# Patient Record
Sex: FEMALE | Race: WHITE | NOT HISPANIC OR LATINO | Employment: OTHER | URBAN - METROPOLITAN AREA
[De-identification: names, ages, dates, MRNs, and addresses within clinical notes are randomized per-mention and may not be internally consistent; named-entity substitution may affect disease eponyms.]

---

## 2017-04-11 ENCOUNTER — TRANSCRIBE ORDERS (OUTPATIENT)
Dept: ENDOCRINOLOGY | Facility: CLINIC | Age: 52
End: 2017-04-11

## 2017-04-11 DIAGNOSIS — E04.1 THYROID NODULE: Primary | ICD-10-CM

## 2017-09-26 ENCOUNTER — TRANSCRIBE ORDERS (OUTPATIENT)
Dept: ADMINISTRATIVE | Facility: HOSPITAL | Age: 52
End: 2017-09-26

## 2017-09-26 DIAGNOSIS — E04.1 RIGHT THYROID NODULE: Primary | ICD-10-CM

## 2017-09-28 ENCOUNTER — APPOINTMENT (OUTPATIENT)
Dept: ULTRASOUND IMAGING | Facility: HOSPITAL | Age: 52
End: 2017-09-28
Attending: SURGERY

## 2017-10-05 ENCOUNTER — HOSPITAL ENCOUNTER (OUTPATIENT)
Dept: ULTRASOUND IMAGING | Facility: HOSPITAL | Age: 52
Discharge: HOME | End: 2017-10-05
Attending: SURGERY | Admitting: SURGERY

## 2017-10-05 ENCOUNTER — HOSPITAL ENCOUNTER (OUTPATIENT)
Dept: ULTRASOUND IMAGING | Facility: HOSPITAL | Age: 52
Discharge: HOME | End: 2017-10-05
Attending: SURGERY

## 2017-10-05 DIAGNOSIS — E04.1 RIGHT THYROID NODULE: ICD-10-CM

## 2017-10-05 PROCEDURE — 76536 US EXAM OF HEAD AND NECK: CPT

## 2017-10-05 PROCEDURE — 76942 ECHO GUIDE FOR BIOPSY: CPT

## 2017-10-05 PROCEDURE — 88173 CYTOPATH EVAL FNA REPORT: CPT | Performed by: SURGERY

## 2017-10-05 RX ORDER — LIDOCAINE HYDROCHLORIDE 10 MG/ML
20 INJECTION, SOLUTION INFILTRATION; PERINEURAL ONCE
Status: COMPLETED | OUTPATIENT
Start: 2017-10-05 | End: 2017-10-05

## 2017-10-05 RX ADMIN — LIDOCAINE HYDROCHLORIDE 10 ML: 10 INJECTION, SOLUTION INFILTRATION; PERINEURAL at 10:38

## 2017-10-05 NOTE — PROCEDURES
Radiology Procedure    Pre-procedure: Multinodular goiter with dominant nodule right thyroid lobe    Procedure Performed: US-guided right thyroid FNA     IV Sedation and/or Anesthesia:  No    Complications: None    Preliminary Findings: Dominant heterogenous 1.5 cm right thyroid lobe nodule.     Specimen Removed: FNA x 3    Estimated Blood Loss:  0ml    Post-Procedure Diagnosis: Multinodular goiter dominant nodule right thyroid lobe    Post-Procedure Plan: Await cytology results. Resume orders from ordering physician.     Standard Discharge Instructions Given:yes     Horacio Barr DO  10/05/17  12:21 PM

## 2017-10-06 LAB
LAB AP CASE REPORT: NORMAL
Lab: NORMAL
PATH REPORT.FINAL DX SPEC: NORMAL

## 2018-03-15 ENCOUNTER — TRANSCRIBE ORDERS (OUTPATIENT)
Dept: ADMINISTRATIVE | Facility: HOSPITAL | Age: 53
End: 2018-03-15

## 2018-03-15 DIAGNOSIS — Z12.31 VISIT FOR SCREENING MAMMOGRAM: Primary | ICD-10-CM

## 2018-04-09 ENCOUNTER — HOSPITAL ENCOUNTER (OUTPATIENT)
Dept: GENERAL RADIOLOGY | Facility: HOSPITAL | Age: 53
Discharge: HOME | End: 2018-04-09
Admitting: INTERNAL MEDICINE

## 2018-04-09 ENCOUNTER — TRANSCRIBE ORDERS (OUTPATIENT)
Dept: ADMINISTRATIVE | Facility: HOSPITAL | Age: 53
End: 2018-04-09

## 2018-04-09 DIAGNOSIS — M79.672 LEFT FOOT PAIN: Primary | ICD-10-CM

## 2018-04-09 DIAGNOSIS — M79.672 LEFT FOOT PAIN: ICD-10-CM

## 2018-04-09 PROCEDURE — 73630 X-RAY EXAM OF FOOT: CPT

## 2018-04-13 ENCOUNTER — HOSPITAL ENCOUNTER (OUTPATIENT)
Dept: MAMMOGRAPHY | Facility: HOSPITAL | Age: 53
Discharge: HOME | End: 2018-04-13
Admitting: INTERNAL MEDICINE

## 2018-04-13 DIAGNOSIS — Z12.31 VISIT FOR SCREENING MAMMOGRAM: ICD-10-CM

## 2018-04-13 PROCEDURE — 77063 BREAST TOMOSYNTHESIS BI: CPT | Performed by: RADIOLOGY

## 2018-04-13 PROCEDURE — 77067 SCR MAMMO BI INCL CAD: CPT | Performed by: RADIOLOGY

## 2018-04-13 PROCEDURE — 77063 BREAST TOMOSYNTHESIS BI: CPT

## 2018-04-13 PROCEDURE — 77067 SCR MAMMO BI INCL CAD: CPT

## 2018-04-26 ENCOUNTER — LAB (OUTPATIENT)
Dept: LAB | Facility: HOSPITAL | Age: 53
End: 2018-04-26

## 2018-04-26 ENCOUNTER — TELEPHONE (OUTPATIENT)
Dept: NEUROLOGY | Facility: CLINIC | Age: 53
End: 2018-04-26

## 2018-04-26 ENCOUNTER — OFFICE VISIT (OUTPATIENT)
Dept: NEUROLOGY | Facility: CLINIC | Age: 53
End: 2018-04-26

## 2018-04-26 VITALS
OXYGEN SATURATION: 99 % | SYSTOLIC BLOOD PRESSURE: 122 MMHG | DIASTOLIC BLOOD PRESSURE: 82 MMHG | WEIGHT: 145 LBS | RESPIRATION RATE: 18 BRPM | HEIGHT: 61 IN | BODY MASS INDEX: 27.38 KG/M2 | HEART RATE: 67 BPM

## 2018-04-26 DIAGNOSIS — G35 MULTIPLE SCLEROSIS (HCC): ICD-10-CM

## 2018-04-26 DIAGNOSIS — G35 MULTIPLE SCLEROSIS (HCC): Primary | ICD-10-CM

## 2018-04-26 LAB
ALBUMIN SERPL-MCNC: 4.9 G/DL (ref 3.2–4.8)
ALBUMIN/GLOB SERPL: 2.5 G/DL (ref 1.5–2.5)
ALP SERPL-CCNC: 77 U/L (ref 25–100)
ALT SERPL W P-5'-P-CCNC: 27 U/L (ref 7–40)
ANION GAP SERPL CALCULATED.3IONS-SCNC: 5 MMOL/L (ref 3–11)
AST SERPL-CCNC: 29 U/L (ref 0–33)
BASOPHILS # BLD AUTO: 0.06 10*3/MM3 (ref 0–0.2)
BASOPHILS NFR BLD AUTO: 1.6 % (ref 0–1)
BILIRUB SERPL-MCNC: 0.4 MG/DL (ref 0.3–1.2)
BUN BLD-MCNC: 25 MG/DL (ref 9–23)
BUN/CREAT SERPL: 31.3 (ref 7–25)
CALCIUM SPEC-SCNC: 9.9 MG/DL (ref 8.7–10.4)
CHLORIDE SERPL-SCNC: 102 MMOL/L (ref 99–109)
CO2 SERPL-SCNC: 32 MMOL/L (ref 20–31)
CREAT BLD-MCNC: 0.8 MG/DL (ref 0.6–1.3)
DEPRECATED RDW RBC AUTO: 45.7 FL (ref 37–54)
EOSINOPHIL # BLD AUTO: 0.14 10*3/MM3 (ref 0–0.3)
EOSINOPHIL NFR BLD AUTO: 3.8 % (ref 0–3)
ERYTHROCYTE [DISTWIDTH] IN BLOOD BY AUTOMATED COUNT: 13.1 % (ref 11.3–14.5)
GFR SERPL CREATININE-BSD FRML MDRD: 75 ML/MIN/1.73
GLOBULIN UR ELPH-MCNC: 2 GM/DL
GLUCOSE BLD-MCNC: 92 MG/DL (ref 70–100)
HCT VFR BLD AUTO: 40.3 % (ref 34.5–44)
HGB BLD-MCNC: 13 G/DL (ref 11.5–15.5)
IMM GRANULOCYTES # BLD: 0 10*3/MM3 (ref 0–0.03)
IMM GRANULOCYTES NFR BLD: 0 % (ref 0–0.6)
LYMPHOCYTES # BLD AUTO: 0.78 10*3/MM3 (ref 0.6–4.8)
LYMPHOCYTES NFR BLD AUTO: 21.1 % (ref 24–44)
MCH RBC QN AUTO: 31.1 PG (ref 27–31)
MCHC RBC AUTO-ENTMCNC: 32.3 G/DL (ref 32–36)
MCV RBC AUTO: 96.4 FL (ref 80–99)
MONOCYTES # BLD AUTO: 0.59 10*3/MM3 (ref 0–1)
MONOCYTES NFR BLD AUTO: 15.9 % (ref 0–12)
NEUTROPHILS # BLD AUTO: 2.13 10*3/MM3 (ref 1.5–8.3)
NEUTROPHILS NFR BLD AUTO: 57.6 % (ref 41–71)
PLATELET # BLD AUTO: 201 10*3/MM3 (ref 150–450)
PMV BLD AUTO: 13.5 FL (ref 6–12)
POTASSIUM BLD-SCNC: 5.4 MMOL/L (ref 3.5–5.5)
PROT SERPL-MCNC: 6.9 G/DL (ref 5.7–8.2)
RBC # BLD AUTO: 4.18 10*6/MM3 (ref 3.89–5.14)
SODIUM BLD-SCNC: 139 MMOL/L (ref 132–146)
WBC NRBC COR # BLD: 3.7 10*3/MM3 (ref 3.5–10.8)

## 2018-04-26 PROCEDURE — 80053 COMPREHEN METABOLIC PANEL: CPT

## 2018-04-26 PROCEDURE — 99245 OFF/OP CONSLTJ NEW/EST HI 55: CPT | Performed by: PSYCHIATRY & NEUROLOGY

## 2018-04-26 PROCEDURE — 85025 COMPLETE CBC W/AUTO DIFF WBC: CPT

## 2018-04-26 PROCEDURE — 36415 COLL VENOUS BLD VENIPUNCTURE: CPT

## 2018-04-26 RX ORDER — MODAFINIL 100 MG/1
100 TABLET ORAL DAILY PRN
COMMUNITY
End: 2019-03-22 | Stop reason: SDUPTHER

## 2018-04-26 RX ORDER — HYDROCODONE BITARTRATE AND ACETAMINOPHEN 5; 325 MG/1; MG/1
TABLET ORAL
Refills: 0 | COMMUNITY
Start: 2018-04-11 | End: 2019-10-31

## 2018-04-26 RX ORDER — GABAPENTIN 300 MG
CAPSULE ORAL
Refills: 0 | COMMUNITY
Start: 2018-03-21 | End: 2018-05-21 | Stop reason: ALTCHOICE

## 2018-04-26 RX ORDER — ZOLPIDEM TARTRATE 10 MG/1
10 TABLET ORAL
Refills: 0 | COMMUNITY
Start: 2018-03-12 | End: 2018-09-21

## 2018-04-26 RX ORDER — DOXYCYCLINE HYCLATE 100 MG/1
CAPSULE ORAL
COMMUNITY
Start: 2018-04-25 | End: 2018-05-21

## 2018-04-26 RX ORDER — PREGABALIN 200 MG/1
200 CAPSULE ORAL NIGHTLY
Qty: 60 CAPSULE | Refills: 5 | Status: SHIPPED | OUTPATIENT
Start: 2018-04-26 | End: 2018-05-21 | Stop reason: SDUPTHER

## 2018-04-26 RX ORDER — RENAGEL 800 MG/1
TABLET ORAL
COMMUNITY
Start: 2018-03-20 | End: 2018-06-20 | Stop reason: ALTCHOICE

## 2018-04-26 NOTE — PROGRESS NOTES
Subjective   Patient ID: Mckenna Castellano is a 53 y.o. female     Chief Complaint   Patient presents with   • Multiple Sclerosis        History of Present Illness    53 y.o. female presents for evaluation of RRMS.  Previous pt of Dr Fraser.    Developed bilateral LE numbness.  Then progressed to left leg up to Face and arm.  Left sided increased sensitivity to touch.  Sx lasted for 3 months.  GBP used to improve pain.  No other relapses since first event.      Started on Aubagio in 10/2017.  Noted mild hair loss after starting.  Denies new or worsening sx.  Heat intolerance is moderate.  Fatigue is mild to moderate.  Balance is off and left leg is weaker.  ST memory decreasing.  Bladder frequency and urgency.      Feet have N/T at bedtime.   mg qhs wears off and does not last all night.        Reviewed previous medical records:     Dx with MS in 2005 by Dr Benitez Fraser.  Onset had numbness in both legs, then LE F/A/L N and allodynia.  Treated with interferon for 2 months, N/V.  Switched to Copaxone for 4 -5 years.  Then to Gilenya.  Tolerating Gilenya without noted dz activity.  Moved to Aubagio due to low counts.  During switch to Aubagio noted new left medullary lesion with left leg weakness.  Stopped Aubagio due to insurance issues.      MRI Brain - 10/2/17 new area of enhancement in left medulla, moderate T2 lesions  Past Medical History:   Diagnosis Date   • Anxiety    • Depression    • Hyperlipidemia    • Migraine      Family History   Problem Relation Age of Onset   • Adopted: Yes   • Breast cancer Neg Hx    • Ovarian cancer Neg Hx      Social History     Social History   • Marital status:      Social History Main Topics   • Smoking status: Never Smoker   • Alcohol use No   • Drug use: No   • Sexual activity: Defer     Other Topics Concern   • Not on file       Review of Systems   Constitutional: Positive for fatigue. Negative for activity change and unexpected weight change.   HENT: Negative for  "tinnitus and trouble swallowing.    Eyes: Positive for visual disturbance. Negative for photophobia.   Respiratory: Negative for apnea, cough and choking.    Cardiovascular: Negative for leg swelling.   Gastrointestinal: Negative for nausea and vomiting.   Endocrine: Positive for heat intolerance. Negative for cold intolerance.   Genitourinary: Positive for frequency and urgency. Negative for difficulty urinating and menstrual problem.   Musculoskeletal: Negative for back pain, gait problem, myalgias and neck pain.   Skin: Negative for color change and rash.   Allergic/Immunologic: Negative for immunocompromised state.   Neurological: Positive for weakness and numbness. Negative for dizziness, tremors, seizures, syncope, facial asymmetry, speech difficulty, light-headedness and headaches.   Hematological: Negative for adenopathy. Does not bruise/bleed easily.   Psychiatric/Behavioral: Positive for decreased concentration. Negative for behavioral problems, confusion, hallucinations and sleep disturbance.       Objective     Vitals:    04/26/18 0936   BP: 122/82   BP Location: Left arm   Patient Position: Sitting   Cuff Size: Adult   Pulse: 67   Resp: 18   SpO2: 99%   Weight: 65.8 kg (145 lb)   Height: 154.9 cm (61\")       Neurologic Exam     Mental Status   Oriented to person, place, and time.   Registration: recalls 3 of 3 objects. Recall at 5 minutes: recalls 3 of 3 objects. Follows 3 step commands.   Attention: normal. Concentration: normal.   Speech: speech is normal   Level of consciousness: alert  Knowledge: good and consistent with education.   Able to name object. Able to read. Able to repeat. Able to write. Normal comprehension.     Cranial Nerves     CN II   Visual fields full to confrontation.   Visual acuity: normal  Right visual field deficit: none  Left visual field deficit: none     CN III, IV, VI   Pupils are equal, round, and reactive to light.  Extraocular motions are normal.   Right pupil: Shape: " regular. Reactivity: brisk. Consensual response: intact.   Left pupil: Shape: regular. Reactivity: brisk. Consensual response: intact.   Nystagmus: none   Diplopia: none  Ophthalmoparesis: none  Upgaze: normal  Downgaze: normal  Conjugate gaze: present  Vestibulo-ocular reflex: present    CN V   Facial sensation intact.   Right corneal reflex: normal  Left corneal reflex: normal    CN VII   Right facial weakness: none  Left facial weakness: none    CN VIII   Hearing: intact    CN IX, X   Palate: symmetric  Right gag reflex: normal  Left gag reflex: normal    CN XI   Right sternocleidomastoid strength: normal  Left sternocleidomastoid strength: normal    CN XII   Tongue: not atrophic  Fasciculations: absent  Tongue deviation: none    Motor Exam   Muscle bulk: normal  Overall muscle tone: normal  Right arm tone: normal  Left arm tone: normal  Right leg tone: normal  Left leg tone: normal    Strength   Strength 5/5 throughout.     Sensory Exam   Light touch normal.   Vibration normal.   Proprioception normal.   Pinprick normal.     Gait, Coordination, and Reflexes     Gait  Gait: normal    Coordination   Romberg: negative  Finger to nose coordination: normal  Heel to shin coordination: normal  Tandem walking coordination: normal    Tremor   Resting tremor: absent  Intention tremor: absent  Action tremor: absent    Reflexes   Reflexes 2+ except as noted.       Physical Exam   Constitutional: She is oriented to person, place, and time. Vital signs are normal. She appears well-developed and well-nourished.   HENT:   Head: Normocephalic and atraumatic.   Eyes: EOM and lids are normal. Pupils are equal, round, and reactive to light.   Fundoscopic exam:       The right eye shows no exudate, no hemorrhage and no papilledema. The right eye shows venous pulsations.        The left eye shows no exudate, no hemorrhage and no papilledema. The left eye shows venous pulsations.   Neck: Normal range of motion and phonation normal.  Normal carotid pulses present. Carotid bruit is not present. No thyroid mass and no thyromegaly present.   Cardiovascular: Normal rate, regular rhythm and normal heart sounds.    Pulmonary/Chest: Effort normal.   Neurological: She is oriented to person, place, and time. She has normal strength. She has a normal Finger-Nose-Finger Test, a normal Heel to Shin Test, a normal Romberg Test and a normal Tandem Gait Test. Gait normal.   Skin: Skin is warm and dry.   Psychiatric: She has a normal mood and affect. Her speech is normal.   Nursing note and vitals reviewed.      Hospital Outpatient Visit on 10/05/2017   Component Date Value Ref Range Status   • Case Report 10/06/2017    Final                    Value:Non-gynecologic Cytology                          Case: [id number]                                  Authorizing Provider:  Mark GARCIA MD          Collected:           10/05/2017 10:40 AM          Ordering Location:     [facility]   Received:            10/05/2017 11:12 AM                                 [facility]                                                                   Pathologist:           Eduardo Rm MD                                                      Specimen:    Thyroid                                                                                   • Final Diagnosis 10/06/2017    Final                    Value:This result contains rich text formatting which cannot be displayed here.         Assessment/Plan     Problem List Items Addressed This Visit        Nervous and Auditory    Multiple sclerosis - Primary    Current Assessment & Plan     Sx stable on Aubagio for last 6 months    Check MRI to assess Aubagio  CBC,CMP  Will change to Lyrica as GBP is wearing off          Relevant Orders    CBC & Differential    Comprehensive Metabolic Panel    MRI Brain With & Without Contrast      Other Visit Diagnoses    None.            No Follow-up on file.

## 2018-04-26 NOTE — ASSESSMENT & PLAN NOTE
Sx stable on Aubagio for last 6 months    Check MRI to assess Aubagio  CBC,CMP  Will change to Lyrica as GBP is wearing off

## 2018-04-26 NOTE — TELEPHONE ENCOUNTER
Calling patient to let her know that mri has been scheduled at [facility] For 5/1/18 and 8 am. Leave message to return my call

## 2018-04-27 ENCOUNTER — HOSPITAL ENCOUNTER (OUTPATIENT)
Dept: GENERAL RADIOLOGY | Facility: HOSPITAL | Age: 53
Discharge: HOME | End: 2018-04-27
Admitting: INTERNAL MEDICINE

## 2018-04-27 ENCOUNTER — TELEPHONE (OUTPATIENT)
Dept: NEUROLOGY | Facility: CLINIC | Age: 53
End: 2018-04-27

## 2018-04-27 ENCOUNTER — TRANSCRIBE ORDERS (OUTPATIENT)
Dept: ADMINISTRATIVE | Facility: HOSPITAL | Age: 53
End: 2018-04-27

## 2018-04-27 DIAGNOSIS — M79.644 FINGER PAIN, RIGHT: ICD-10-CM

## 2018-04-27 DIAGNOSIS — M79.644 FINGER PAIN, RIGHT: Primary | ICD-10-CM

## 2018-04-27 PROCEDURE — 73140 X-RAY EXAM OF FINGER(S): CPT

## 2018-05-18 ENCOUNTER — TELEPHONE (OUTPATIENT)
Dept: NEUROLOGY | Facility: CLINIC | Age: 53
End: 2018-05-18

## 2018-05-18 NOTE — TELEPHONE ENCOUNTER
Received a call from patient specialty pharmacy for a refill on Aubagio. Gave a 1 month supply till patient can come back in for evaluation of MRI. Gave verbal to pharmacist.

## 2018-05-21 ENCOUNTER — OFFICE VISIT (OUTPATIENT)
Dept: NEUROLOGY | Facility: CLINIC | Age: 53
End: 2018-05-21

## 2018-05-21 VITALS
DIASTOLIC BLOOD PRESSURE: 84 MMHG | SYSTOLIC BLOOD PRESSURE: 122 MMHG | HEIGHT: 61 IN | HEART RATE: 58 BPM | BODY MASS INDEX: 27.38 KG/M2 | OXYGEN SATURATION: 99 % | RESPIRATION RATE: 18 BRPM | WEIGHT: 145 LBS

## 2018-05-21 DIAGNOSIS — M79.2 NEUROPATHIC PAIN: ICD-10-CM

## 2018-05-21 DIAGNOSIS — G35 MULTIPLE SCLEROSIS (HCC): Primary | ICD-10-CM

## 2018-05-21 PROCEDURE — 99214 OFFICE O/P EST MOD 30 MIN: CPT | Performed by: PSYCHIATRY & NEUROLOGY

## 2018-05-21 RX ORDER — PREGABALIN 200 MG/1
200 CAPSULE ORAL 2 TIMES DAILY
Qty: 60 CAPSULE | Refills: 5 | Status: SHIPPED | OUTPATIENT
Start: 2018-05-21 | End: 2018-12-07 | Stop reason: SDUPTHER

## 2018-05-21 NOTE — PROGRESS NOTES
Subjective   Patient ID: Mckenna Castellano is a 53 y.o. female     Chief Complaint   Patient presents with   • Multiple Sclerosis        History of Present Illness    53 y.o. female presents for evaluation of RRMS.  Last visit on 4/26/18 continued Aubagio, ordered MRI Brain and labs, started Lyrica.      MRI Brain, my review of films, 5/1/18 few scattered T2 lesions, PVWM and medulla  without new enlarging or enhancing lesions    Labs - 4/26/18 CBC,CMP - [facility]    Increased bladder or frequency in last few months.     Started on Lyrica 200 mg qhs but needs more during the day.  Feet/hands develop B/N/T with fatigue and stress.     Concerned about hair loss on Aubagio.  Was doing well on Gilenya and wishes to restart.  Stopped due to low abs lymph.     Problem history:    Developed bilateral LE numbness.  Then progressed to left leg up to Face and arm.  Left sided increased sensitivity to touch.  Sx lasted for 3 months.  GBP used to improve pain.  No other relapses since first event.      Started on Aubagio in 10/2017.  Noted mild hair loss after starting.  Denies new or worsening sx.  Heat intolerance is moderate.  Fatigue is mild to moderate.  Balance is off and left leg is weaker.  ST memory decreasing.  Bladder frequency and urgency.      Feet have N/T at bedtime.   mg qhs wears off and does not last all night.        Reviewed previous medical records:     Dx with MS in 2005 by Dr Benitez Fraser.  Onset had numbness in both legs, then LE F/A/L N and allodynia.  Treated with interferon for 2 months, N/V.  Switched to Copaxone for 4 -5 years.  Then to Gilenya.  Tolerating Gilenya without noted dz activity.  Moved to [facility] due to low counts.  During switch to Aubagio noted new left medullary lesion with left leg weakness.  Stopped Aubagio due to insurance issues.      MRI Brain - 10/2/17 new area of enhancement in right medulla, moderate T2 lesions    Past Medical History:   Diagnosis Date   • Anxiety    • Depression  "   • Hyperlipidemia    • Migraine    • MS (multiple sclerosis)      Family History   Problem Relation Age of Onset   • Adopted: Yes   • Breast cancer Neg Hx    • Ovarian cancer Neg Hx      Social History     Social History   • Marital status:      Social History Main Topics   • Smoking status: Never Smoker   • Alcohol use No   • Drug use: No   • Sexual activity: Defer     Other Topics Concern   • Not on file       Review of Systems   Constitutional: Negative for activity change and unexpected weight change.   HENT: Negative for tinnitus and trouble swallowing.    Eyes: Positive for visual disturbance. Negative for photophobia.   Respiratory: Negative for apnea and choking.    Cardiovascular: Negative for leg swelling.   Endocrine: Positive for heat intolerance. Negative for cold intolerance.   Genitourinary: Positive for frequency and urgency. Negative for difficulty urinating and menstrual problem.   Musculoskeletal: Negative for back pain and gait problem.   Skin: Negative for color change.   Allergic/Immunologic: Negative for immunocompromised state.   Neurological: Negative for dizziness, tremors, seizures, syncope, facial asymmetry, speech difficulty and light-headedness.   Hematological: Negative for adenopathy. Does not bruise/bleed easily.   Psychiatric/Behavioral: Positive for decreased concentration. Negative for behavioral problems, confusion, hallucinations and sleep disturbance.       Objective     Vitals:    05/21/18 1307   BP: 122/84   BP Location: Right arm   Patient Position: Sitting   Cuff Size: Adult   Pulse: 58   Resp: 18   SpO2: 99%   Weight: 65.8 kg (145 lb)   Height: 154.9 cm (61\")       Neurologic Exam     Mental Status   Registration: recalls 3 of 3 objects. Recall at 5 minutes: recalls 3 of 3 objects. Follows 3 step commands.   Attention: normal. Concentration: normal.   Level of consciousness: alert  Knowledge: good and consistent with education.   Able to name object. Able to " read. Able to repeat. Able to write. Normal comprehension.     Cranial Nerves     CN II   Visual fields full to confrontation.   Visual acuity: normal  Right visual field deficit: none  Left visual field deficit: none     CN III, IV, VI   Right pupil: Shape: regular. Reactivity: brisk. Consensual response: intact.   Left pupil: Shape: regular. Reactivity: brisk. Consensual response: intact.   Nystagmus: none   Diplopia: none  Ophthalmoparesis: none  Upgaze: normal  Downgaze: normal  Conjugate gaze: present  Vestibulo-ocular reflex: present    CN V   Facial sensation intact.   Right corneal reflex: normal  Left corneal reflex: normal    CN VII   Right facial weakness: none  Left facial weakness: none    CN VIII   Hearing: intact    CN IX, X   Palate: symmetric  Right gag reflex: normal  Left gag reflex: normal    CN XI   Right sternocleidomastoid strength: normal  Left sternocleidomastoid strength: normal    CN XII   Tongue: not atrophic  Fasciculations: absent  Tongue deviation: none    Motor Exam   Muscle bulk: normal  Overall muscle tone: normal  Right arm tone: normal  Left arm tone: normal  Right leg tone: normal  Left leg tone: normal    Sensory Exam   Light touch normal.   Vibration normal.   Proprioception normal.   Pinprick normal.     Gait, Coordination, and Reflexes     Tremor   Resting tremor: absent  Intention tremor: absent  Action tremor: absent    Reflexes   Reflexes 2+ except as noted.       Physical Exam   Constitutional: She appears well-developed and well-nourished.   Nursing note and vitals reviewed.      Lab on 04/26/2018   Component Date Value Ref Range Status   • Glucose 04/26/2018 92  70 - 100 mg/dL Final   • BUN 04/26/2018 25* 9 - 23 mg/dL Final   • Creatinine 04/26/2018 0.80  0.60 - 1.30 mg/dL Final   • Sodium 04/26/2018 139  132 - 146 mmol/L Final   • Potassium 04/26/2018 5.4  3.5 - 5.5 mmol/L Final   • Chloride 04/26/2018 102  99 - 109 mmol/L Final   • CO2 04/26/2018 32.0* 20.0 - 31.0  mmol/L Final   • Calcium 04/26/2018 9.9  8.7 - 10.4 mg/dL Final   • Total Protein 04/26/2018 6.9  5.7 - 8.2 g/dL Final   • Albumin 04/26/2018 4.90* 3.20 - 4.80 g/dL Final   • ALT (SGPT) 04/26/2018 27  7 - 40 U/L Final   • AST (SGOT) 04/26/2018 29  0 - 33 U/L Final   • Alkaline Phosphatase 04/26/2018 77  25 - 100 U/L Final   • Total Bilirubin 04/26/2018 0.4  0.3 - 1.2 mg/dL Final   • eGFR Non African Amer 04/26/2018 75  >60 mL/min/1.73 Final   • Globulin 04/26/2018 2.0  gm/dL Final   • A/G Ratio 04/26/2018 2.5  1.5 - 2.5 g/dL Final   • BUN/Creatinine Ratio 04/26/2018 31.3* 7.0 - 25.0 Final   • Anion Gap 04/26/2018 5.0  3.0 - 11.0 mmol/L Final   • WBC 04/26/2018 3.70  3.50 - 10.80 10*3/mm3 Final   • RBC 04/26/2018 4.18  3.89 - 5.14 10*6/mm3 Final   • Hemoglobin 04/26/2018 13.0  11.5 - 15.5 g/dL Final   • Hematocrit 04/26/2018 40.3  34.5 - 44.0 % Final   • MCV 04/26/2018 96.4  80.0 - 99.0 fL Final   • MCH 04/26/2018 31.1* 27.0 - 31.0 pg Final   • MCHC 04/26/2018 32.3  32.0 - 36.0 g/dL Final   • RDW 04/26/2018 13.1  11.3 - 14.5 % Final   • RDW-SD 04/26/2018 45.7  37.0 - 54.0 fl Final   • MPV 04/26/2018 13.5* 6.0 - 12.0 fL Final   • Platelets 04/26/2018 201  150 - 450 10*3/mm3 Final   • Neutrophil % 04/26/2018 57.6  41.0 - 71.0 % Final   • Lymphocyte % 04/26/2018 21.1* 24.0 - 44.0 % Final   • Monocyte % 04/26/2018 15.9* 0.0 - 12.0 % Final   • Eosinophil % 04/26/2018 3.8* 0.0 - 3.0 % Final   • Basophil % 04/26/2018 1.6* 0.0 - 1.0 % Final   • Immature Grans % 04/26/2018 0.0  0.0 - 0.6 % Final   • Neutrophils, Absolute 04/26/2018 2.13  1.50 - 8.30 10*3/mm3 Final   • Lymphocytes, Absolute 04/26/2018 0.78  0.60 - 4.80 10*3/mm3 Final   • Monocytes, Absolute 04/26/2018 0.59  0.00 - 1.00 10*3/mm3 Final   • Eosinophils, Absolute 04/26/2018 0.14  0.00 - 0.30 10*3/mm3 Final   • Basophils, Absolute 04/26/2018 0.06  0.00 - 0.20 10*3/mm3 Final   • Immature Grans, Absolute 04/26/2018 0.00  0.00 - 0.03 10*3/mm3 Final          Assessment/Plan     Problem List Items Addressed This Visit        Nervous and Auditory    Multiple sclerosis - Primary    Current Assessment & Plan     MRI brain is stable     Wishes to switch back to Gilenya due to hair loss             Neuropathic pain    Current Assessment & Plan     Increase Lyrica 200 mg BID due to sx coming back during the day                   No Follow-up on file.

## 2018-06-05 ENCOUNTER — TELEPHONE (OUTPATIENT)
Dept: NEUROLOGY | Facility: CLINIC | Age: 53
End: 2018-06-05

## 2018-06-05 DIAGNOSIS — G35 MS (MULTIPLE SCLEROSIS) (HCC): Primary | ICD-10-CM

## 2018-06-06 NOTE — TELEPHONE ENCOUNTER
Called patient, spoke with the patient, patient is aware of the labs needing to be completed and also that she needs to have a base line eye exam completed. Patient verbalized understanding.

## 2018-06-11 ENCOUNTER — TELEPHONE (OUTPATIENT)
Dept: NEUROLOGY | Facility: CLINIC | Age: 53
End: 2018-06-11

## 2018-06-11 ENCOUNTER — LAB (OUTPATIENT)
Dept: LAB | Facility: HOSPITAL | Age: 53
End: 2018-06-11

## 2018-06-11 DIAGNOSIS — G35 MS (MULTIPLE SCLEROSIS) (HCC): ICD-10-CM

## 2018-06-11 LAB
ALBUMIN SERPL-MCNC: 4.69 G/DL (ref 3.2–4.8)
ALBUMIN/GLOB SERPL: 2 G/DL (ref 1.5–2.5)
ALP SERPL-CCNC: 94 U/L (ref 25–100)
ALT SERPL W P-5'-P-CCNC: 21 U/L (ref 7–40)
ANION GAP SERPL CALCULATED.3IONS-SCNC: 11 MMOL/L (ref 3–11)
AST SERPL-CCNC: 26 U/L (ref 0–33)
BASOPHILS # BLD AUTO: 0.05 10*3/MM3 (ref 0–0.2)
BASOPHILS NFR BLD AUTO: 1.5 % (ref 0–1)
BILIRUB SERPL-MCNC: 0.4 MG/DL (ref 0.3–1.2)
BUN BLD-MCNC: 20 MG/DL (ref 9–23)
BUN/CREAT SERPL: 21.5 (ref 7–25)
CALCIUM SPEC-SCNC: 9.6 MG/DL (ref 8.7–10.4)
CHLORIDE SERPL-SCNC: 103 MMOL/L (ref 99–109)
CO2 SERPL-SCNC: 29 MMOL/L (ref 20–31)
CREAT BLD-MCNC: 0.93 MG/DL (ref 0.6–1.3)
DEPRECATED RDW RBC AUTO: 46.7 FL (ref 37–54)
EOSINOPHIL # BLD AUTO: 0.12 10*3/MM3 (ref 0–0.3)
EOSINOPHIL NFR BLD AUTO: 3.6 % (ref 0–3)
ERYTHROCYTE [DISTWIDTH] IN BLOOD BY AUTOMATED COUNT: 13.2 % (ref 11.3–14.5)
GFR SERPL CREATININE-BSD FRML MDRD: 63 ML/MIN/1.73
GLOBULIN UR ELPH-MCNC: 2.3 GM/DL
GLUCOSE BLD-MCNC: 111 MG/DL (ref 70–100)
HCT VFR BLD AUTO: 38.2 % (ref 34.5–44)
HGB BLD-MCNC: 12.4 G/DL (ref 11.5–15.5)
IMM GRANULOCYTES # BLD: 0 10*3/MM3 (ref 0–0.03)
IMM GRANULOCYTES NFR BLD: 0 % (ref 0–0.6)
LYMPHOCYTES # BLD AUTO: 0.9 10*3/MM3 (ref 0.6–4.8)
LYMPHOCYTES NFR BLD AUTO: 27.3 % (ref 24–44)
MCH RBC QN AUTO: 31.2 PG (ref 27–31)
MCHC RBC AUTO-ENTMCNC: 32.5 G/DL (ref 32–36)
MCV RBC AUTO: 96 FL (ref 80–99)
MONOCYTES # BLD AUTO: 0.43 10*3/MM3 (ref 0–1)
MONOCYTES NFR BLD AUTO: 13 % (ref 0–12)
NEUTROPHILS # BLD AUTO: 1.8 10*3/MM3 (ref 1.5–8.3)
NEUTROPHILS NFR BLD AUTO: 54.6 % (ref 41–71)
PLATELET # BLD AUTO: 195 10*3/MM3 (ref 150–450)
PMV BLD AUTO: 13.7 FL (ref 6–12)
POTASSIUM BLD-SCNC: 4.6 MMOL/L (ref 3.5–5.5)
PROT SERPL-MCNC: 7 G/DL (ref 5.7–8.2)
RBC # BLD AUTO: 3.98 10*6/MM3 (ref 3.89–5.14)
SODIUM BLD-SCNC: 143 MMOL/L (ref 132–146)
WBC NRBC COR # BLD: 3.3 10*3/MM3 (ref 3.5–10.8)

## 2018-06-11 PROCEDURE — 85025 COMPLETE CBC W/AUTO DIFF WBC: CPT

## 2018-06-11 PROCEDURE — 36415 COLL VENOUS BLD VENIPUNCTURE: CPT

## 2018-06-11 PROCEDURE — 86787 VARICELLA-ZOSTER ANTIBODY: CPT

## 2018-06-11 PROCEDURE — 80053 COMPREHEN METABOLIC PANEL: CPT

## 2018-06-11 NOTE — TELEPHONE ENCOUNTER
----- Message from Windy Quiñones sent at 6/11/2018  1:04 PM EDT -----  Regarding: refill  Contact: [phone]  [facility] [place]    ambien 10 mg

## 2018-06-12 LAB — VZV IGG SER IA-ACNC: 887 INDEX

## 2018-06-20 ENCOUNTER — TELEPHONE (OUTPATIENT)
Dept: NEUROLOGY | Facility: CLINIC | Age: 53
End: 2018-06-20

## 2018-06-20 RX ORDER — FINGOLIMOD HYDROCHLORIDE 0.5 MG/1
0.5 CAPSULE ORAL DAILY
Qty: 30 CAPSULE | Refills: 3 | Status: SHIPPED | OUTPATIENT
Start: 2018-06-20 | End: 2018-10-15 | Stop reason: SDUPTHER

## 2018-06-20 NOTE — TELEPHONE ENCOUNTER
Received fax requesting a script be sent to the specialty pharmacy. Please double check this script and make sure its correct. Has to have ICD10 on the script- so check that also for me. Thanks!!

## 2018-06-20 NOTE — TELEPHONE ENCOUNTER
I called the number, spoke with a person but they were asking questions I did not feel that I could answer, told them I would speak with you regarding the patient and call them back. Is there anything special I need to tell them? Thanks for the help!!

## 2018-06-20 NOTE — TELEPHONE ENCOUNTER
The authorization was scanned in the chart on 6-14-18 and no one notified.  I checked it today and found that it was there.  I have spoken with the patient and she will be scheduled for FDO on June 27.

## 2018-06-20 NOTE — TELEPHONE ENCOUNTER
----- Message from Windy Quiñones sent at 6/20/2018  2:53 PM EDT -----  Regarding: FIRST DOSE OBSERVATION  Contact: [phone]  [facility] phoned they would like to know if patient completed their first dose observation.    [phone] phone number

## 2018-06-21 ENCOUNTER — TELEPHONE (OUTPATIENT)
Dept: NEUROLOGY | Facility: CLINIC | Age: 53
End: 2018-06-21

## 2018-06-21 ENCOUNTER — PREP FOR SURGERY (OUTPATIENT)
Dept: OTHER | Facility: HOSPITAL | Age: 53
End: 2018-06-21

## 2018-06-21 DIAGNOSIS — G35 MS (MULTIPLE SCLEROSIS) (HCC): Primary | ICD-10-CM

## 2018-06-21 RX ORDER — FINGOLIMOD HYDROCHLORIDE 0.5 MG/1
0.5 CAPSULE ORAL DAILY
Qty: 6 CAPSULE | Refills: 0 | COMMUNITY
Start: 2018-06-21 | End: 2018-09-21

## 2018-06-21 NOTE — TELEPHONE ENCOUNTER
----- Message from Hyacinth Freedman RN sent at 6/21/2018  2:05 PM EDT -----  Regarding: FDO order  Need Deb FDO order entered so it can be scheduled.  She would like to complete on June 27, 2018.  Thanks.

## 2018-06-27 ENCOUNTER — TELEPHONE (OUTPATIENT)
Dept: INFUSION THERAPY | Facility: HOSPITAL | Age: 53
End: 2018-06-27

## 2018-06-27 ENCOUNTER — HOSPITAL ENCOUNTER (OUTPATIENT)
Dept: INFUSION THERAPY | Facility: HOSPITAL | Age: 53
Discharge: HOME | End: 2018-06-27
Attending: PSYCHIATRY & NEUROLOGY | Admitting: PSYCHIATRY & NEUROLOGY

## 2018-06-27 VITALS
TEMPERATURE: 98.6 F | HEIGHT: 61 IN | RESPIRATION RATE: 16 BRPM | OXYGEN SATURATION: 97 % | HEART RATE: 57 BPM | WEIGHT: 150 LBS | DIASTOLIC BLOOD PRESSURE: 71 MMHG | SYSTOLIC BLOOD PRESSURE: 113 MMHG | BODY MASS INDEX: 28.32 KG/M2

## 2018-06-27 DIAGNOSIS — G35 MS (MULTIPLE SCLEROSIS) (HCC): ICD-10-CM

## 2018-06-27 DIAGNOSIS — G35 MULTIPLE SCLEROSIS (HCC): ICD-10-CM

## 2018-06-27 PROCEDURE — 93005 ELECTROCARDIOGRAM TRACING: CPT | Performed by: PSYCHIATRY & NEUROLOGY

## 2018-06-27 PROCEDURE — 93010 ELECTROCARDIOGRAM REPORT: CPT | Performed by: INTERNAL MEDICINE

## 2018-06-27 RX ORDER — FINGOLIMOD HYDROCHLORIDE 0.5 MG/1
0.5 CAPSULE ORAL DAILY
Status: DISCONTINUED | OUTPATIENT
Start: 2018-06-27 | End: 2018-06-29 | Stop reason: HOSPADM

## 2018-06-27 RX ORDER — OMEPRAZOLE 20 MG/1
20 CAPSULE, DELAYED RELEASE ORAL DAILY
COMMUNITY
End: 2019-03-22

## 2018-06-27 RX ADMIN — FINGOLIMOD HYDROCHLORIDE 0.5 MG: 0.5 CAPSULE ORAL at 09:06

## 2018-06-28 ENCOUNTER — TELEPHONE (OUTPATIENT)
Dept: INFUSION THERAPY | Facility: HOSPITAL | Age: 53
End: 2018-06-28

## 2018-09-21 ENCOUNTER — LAB REQUISITION (OUTPATIENT)
Dept: LAB | Facility: HOSPITAL | Age: 53
End: 2018-09-21

## 2018-09-21 ENCOUNTER — OFFICE VISIT (OUTPATIENT)
Dept: NEUROLOGY | Facility: CLINIC | Age: 53
End: 2018-09-21

## 2018-09-21 VITALS
WEIGHT: 152 LBS | OXYGEN SATURATION: 98 % | HEART RATE: 57 BPM | DIASTOLIC BLOOD PRESSURE: 68 MMHG | SYSTOLIC BLOOD PRESSURE: 124 MMHG | BODY MASS INDEX: 28.7 KG/M2 | HEIGHT: 61 IN | RESPIRATION RATE: 16 BRPM

## 2018-09-21 DIAGNOSIS — Z00.00 ROUTINE GENERAL MEDICAL EXAMINATION AT A HEALTH CARE FACILITY: ICD-10-CM

## 2018-09-21 DIAGNOSIS — M79.2 NEUROPATHIC PAIN: ICD-10-CM

## 2018-09-21 DIAGNOSIS — G35 MULTIPLE SCLEROSIS (HCC): ICD-10-CM

## 2018-09-21 DIAGNOSIS — G35 MULTIPLE SCLEROSIS (HCC): Primary | ICD-10-CM

## 2018-09-21 LAB
ALBUMIN SERPL-MCNC: 4.98 G/DL (ref 3.2–4.8)
ALBUMIN/GLOB SERPL: 2.9 G/DL (ref 1.5–2.5)
ALP SERPL-CCNC: 83 U/L (ref 25–100)
ALT SERPL W P-5'-P-CCNC: 46 U/L (ref 7–40)
ANION GAP SERPL CALCULATED.3IONS-SCNC: 12 MMOL/L (ref 3–11)
AST SERPL-CCNC: 40 U/L (ref 0–33)
BASOPHILS # BLD AUTO: 0.02 10*3/MM3 (ref 0–0.2)
BASOPHILS NFR BLD AUTO: 0.7 % (ref 0–1)
BILIRUB SERPL-MCNC: 0.4 MG/DL (ref 0.3–1.2)
BUN BLD-MCNC: 20 MG/DL (ref 9–23)
BUN/CREAT SERPL: 24.4 (ref 7–25)
CALCIUM SPEC-SCNC: 10 MG/DL (ref 8.7–10.4)
CHLORIDE SERPL-SCNC: 104 MMOL/L (ref 99–109)
CO2 SERPL-SCNC: 30 MMOL/L (ref 20–31)
CREAT BLD-MCNC: 0.82 MG/DL (ref 0.6–1.3)
DEPRECATED RDW RBC AUTO: 47.9 FL (ref 37–54)
EOSINOPHIL # BLD AUTO: 0.05 10*3/MM3 (ref 0–0.3)
EOSINOPHIL NFR BLD AUTO: 1.8 % (ref 0–3)
ERYTHROCYTE [DISTWIDTH] IN BLOOD BY AUTOMATED COUNT: 13.8 % (ref 11.3–14.5)
GFR SERPL CREATININE-BSD FRML MDRD: 73 ML/MIN/1.73
GLOBULIN UR ELPH-MCNC: 1.7 GM/DL
GLUCOSE BLD-MCNC: 95 MG/DL (ref 70–100)
HCT VFR BLD AUTO: 38.1 % (ref 34.5–44)
HGB BLD-MCNC: 12.3 G/DL (ref 11.5–15.5)
IMM GRANULOCYTES # BLD: 0.01 10*3/MM3 (ref 0–0.03)
IMM GRANULOCYTES NFR BLD: 0.4 % (ref 0–0.6)
LYMPHOCYTES # BLD AUTO: 0.35 10*3/MM3 (ref 0.6–4.8)
LYMPHOCYTES NFR BLD AUTO: 12.9 % (ref 24–44)
MCH RBC QN AUTO: 31 PG (ref 27–31)
MCHC RBC AUTO-ENTMCNC: 32.3 G/DL (ref 32–36)
MCV RBC AUTO: 96 FL (ref 80–99)
MONOCYTES # BLD AUTO: 0.35 10*3/MM3 (ref 0–1)
MONOCYTES NFR BLD AUTO: 12.9 % (ref 0–12)
NEUTROPHILS # BLD AUTO: 1.94 10*3/MM3 (ref 1.5–8.3)
NEUTROPHILS NFR BLD AUTO: 71.7 % (ref 41–71)
PLATELET # BLD AUTO: 182 10*3/MM3 (ref 150–450)
PMV BLD AUTO: 13.2 FL (ref 6–12)
POTASSIUM BLD-SCNC: 5 MMOL/L (ref 3.5–5.5)
PROT SERPL-MCNC: 6.7 G/DL (ref 5.7–8.2)
RBC # BLD AUTO: 3.97 10*6/MM3 (ref 3.89–5.14)
SODIUM BLD-SCNC: 146 MMOL/L (ref 132–146)
WBC NRBC COR # BLD: 2.71 10*3/MM3 (ref 3.5–10.8)

## 2018-09-21 PROCEDURE — 85025 COMPLETE CBC W/AUTO DIFF WBC: CPT | Performed by: PSYCHIATRY & NEUROLOGY

## 2018-09-21 PROCEDURE — 99213 OFFICE O/P EST LOW 20 MIN: CPT | Performed by: PSYCHIATRY & NEUROLOGY

## 2018-09-21 PROCEDURE — 80053 COMPREHEN METABOLIC PANEL: CPT | Performed by: PSYCHIATRY & NEUROLOGY

## 2018-09-21 NOTE — PROGRESS NOTES
Subjective   Patient ID: Mckenna Castellano is a 53 y.o. female     Chief Complaint   Patient presents with   • Multiple Sclerosis        History of Present Illness    53 y.o. female returns in follow up for RRMS.  Last visit on 5/21/18 discontinued Aubagio, started Gilenya, increased Lyrica.      MRI Brain, 5/1/18 few scattered T2 lesions, PVWM and medulla  without new enlarging or enhancing lesions    Labs - 6/11/18 CBC,CMP, VZV - NCS    6/27/18 FDO for Gilenya    Increased bladder or frequency in last few months.     Lyrica 200 mg BID prn.  Feet/hands develop B/N/T controlled at night with Lyrica.     Fatigue is moderate.  Heat intolerance is moderate.  Naps occasionally.      Problem history:    Developed bilateral LE numbness.  Then progressed to left leg up to Face and arm.  Left sided increased sensitivity to touch.  Sx lasted for 3 months.  GBP used to improve pain.  No other relapses since first event.      Started on Aubagio in 10/2017.  Noted mild hair loss after starting.  Denies new or worsening sx.  Heat intolerance is moderate.  Fatigue is mild to moderate.  Balance is off and left leg is weaker.  ST memory decreasing.  Bladder frequency and urgency.      Feet have N/T at bedtime.   mg qhs wears off and does not last all night.        Reviewed previous medical records:     Dx with MS in 2005 by Dr Benitez Fraser.  Onset had numbness in both legs, then LE F/A/L N and allodynia.  Treated with interferon for 2 months, N/V.  Switched to Copaxone for 4 -5 years.  Then to Gilenya.  Tolerating Gilenya without noted dz activity.  Moved to Aubagio due to low counts.  During switch to Aubagio noted new left medullary lesion with left leg weakness.  Stopped Aubagio due to insurance issues.      MRI Brain - 10/2/17 new area of enhancement in right medulla, moderate T2 lesions    Past Medical History:   Diagnosis Date   • Anxiety    • Depression    • Hyperlipidemia    • Migraine    • MS (multiple sclerosis)  "(CMS/Formerly [facility])      Family History   Problem Relation Age of Onset   • Adopted: Yes   • Breast cancer Neg Hx    • Ovarian cancer Neg Hx      Social History     Social History   • Marital status:      Social History Main Topics   • Smoking status: Never Smoker   • Alcohol use No   • Drug use: No   • Sexual activity: Defer     Other Topics Concern   • Not on file       Review of Systems   Constitutional: Negative for activity change and unexpected weight change.   HENT: Negative for tinnitus and trouble swallowing.    Eyes: Positive for visual disturbance. Negative for photophobia.   Respiratory: Negative for apnea and choking.    Cardiovascular: Negative for leg swelling.   Endocrine: Positive for heat intolerance. Negative for cold intolerance.   Genitourinary: Positive for frequency and urgency. Negative for difficulty urinating and menstrual problem.   Musculoskeletal: Negative for back pain and gait problem.   Skin: Negative for color change.   Allergic/Immunologic: Negative for immunocompromised state.   Neurological: Negative for dizziness, tremors, seizures, syncope, facial asymmetry, speech difficulty and light-headedness.   Hematological: Negative for adenopathy. Does not bruise/bleed easily.   Psychiatric/Behavioral: Positive for decreased concentration. Negative for behavioral problems, confusion, hallucinations and sleep disturbance.       Objective     Vitals:    09/21/18 1301   BP: 124/68   BP Location: Left arm   Patient Position: Sitting   Cuff Size: Adult   Pulse: 57   Resp: 16   SpO2: 98%   Weight: 68.9 kg (152 lb)   Height: 154.9 cm (61\")       Neurologic Exam     Mental Status   Registration: recalls 3 of 3 objects. Recall at 5 minutes: recalls 3 of 3 objects. Follows 3 step commands.   Attention: normal. Concentration: normal.   Level of consciousness: alert  Knowledge: good and consistent with education.   Able to name object. Able to read. Able to repeat. Able to write. Normal comprehension. "     Cranial Nerves     CN II   Visual fields full to confrontation.   Visual acuity: normal  Right visual field deficit: none  Left visual field deficit: none     CN III, IV, VI   Right pupil: Shape: regular. Reactivity: brisk. Consensual response: intact.   Left pupil: Shape: regular. Reactivity: brisk. Consensual response: intact.   Nystagmus: none   Diplopia: none  Ophthalmoparesis: none  Upgaze: normal  Downgaze: normal  Conjugate gaze: present  Vestibulo-ocular reflex: present    CN V   Facial sensation intact.   Right corneal reflex: normal  Left corneal reflex: normal    CN VII   Right facial weakness: none  Left facial weakness: none    CN VIII   Hearing: intact    CN IX, X   Palate: symmetric  Right gag reflex: normal  Left gag reflex: normal    CN XI   Right sternocleidomastoid strength: normal  Left sternocleidomastoid strength: normal    CN XII   Tongue: not atrophic  Fasciculations: absent  Tongue deviation: none    Motor Exam   Muscle bulk: normal  Overall muscle tone: normal  Right arm tone: normal  Left arm tone: normal  Right leg tone: normal  Left leg tone: normal    Sensory Exam   Light touch normal.   Vibration normal.   Proprioception normal.   Pinprick normal.     Gait, Coordination, and Reflexes     Tremor   Resting tremor: absent  Intention tremor: absent  Action tremor: absent    Reflexes   Reflexes 2+ except as noted.       Physical Exam   Constitutional: She appears well-developed and well-nourished.   Nursing note and vitals reviewed.      Lab on 06/11/2018   Component Date Value Ref Range Status   • Glucose 06/11/2018 111* 70 - 100 mg/dL Final   • BUN 06/11/2018 20  9 - 23 mg/dL Final   • Creatinine 06/11/2018 0.93  0.60 - 1.30 mg/dL Final   • Sodium 06/11/2018 143  132 - 146 mmol/L Final   • Potassium 06/11/2018 4.6  3.5 - 5.5 mmol/L Final   • Chloride 06/11/2018 103  99 - 109 mmol/L Final   • CO2 06/11/2018 29.0  20.0 - 31.0 mmol/L Final   • Calcium 06/11/2018 9.6  8.7 - 10.4 mg/dL  Final   • Total Protein 06/11/2018 7.0  5.7 - 8.2 g/dL Final   • Albumin 06/11/2018 4.69  3.20 - 4.80 g/dL Final   • ALT (SGPT) 06/11/2018 21  7 - 40 U/L Final   • AST (SGOT) 06/11/2018 26  0 - 33 U/L Final   • Alkaline Phosphatase 06/11/2018 94  25 - 100 U/L Final   • Total Bilirubin 06/11/2018 0.4  0.3 - 1.2 mg/dL Final   • eGFR Non African Amer 06/11/2018 63  >60 mL/min/1.73 Final   • Globulin 06/11/2018 2.3  gm/dL Final   • A/G Ratio 06/11/2018 2.0  1.5 - 2.5 g/dL Final   • BUN/Creatinine Ratio 06/11/2018 21.5  7.0 - 25.0 Final   • Anion Gap 06/11/2018 11.0  3.0 - 11.0 mmol/L Final   • Varicella IgG 06/11/2018 887  Immune >165 index Final                                   Negative          <135                                 Equivocal    135 - 165                                 Positive          >165  A positive result generally indicates exposure to the  pathogen or administration of specific immunoglobulins,  but it is not indication of active infection or stage  of disease.   • WBC 06/11/2018 3.30* 3.50 - 10.80 10*3/mm3 Final   • RBC 06/11/2018 3.98  3.89 - 5.14 10*6/mm3 Final   • Hemoglobin 06/11/2018 12.4  11.5 - 15.5 g/dL Final   • Hematocrit 06/11/2018 38.2  34.5 - 44.0 % Final   • MCV 06/11/2018 96.0  80.0 - 99.0 fL Final   • MCH 06/11/2018 31.2* 27.0 - 31.0 pg Final   • MCHC 06/11/2018 32.5  32.0 - 36.0 g/dL Final   • RDW 06/11/2018 13.2  11.3 - 14.5 % Final   • RDW-SD 06/11/2018 46.7  37.0 - 54.0 fl Final   • MPV 06/11/2018 13.7* 6.0 - 12.0 fL Final   • Platelets 06/11/2018 195  150 - 450 10*3/mm3 Final   • Neutrophil % 06/11/2018 54.6  41.0 - 71.0 % Final   • Lymphocyte % 06/11/2018 27.3  24.0 - 44.0 % Final   • Monocyte % 06/11/2018 13.0* 0.0 - 12.0 % Final   • Eosinophil % 06/11/2018 3.6* 0.0 - 3.0 % Final   • Basophil % 06/11/2018 1.5* 0.0 - 1.0 % Final   • Immature Grans % 06/11/2018 0.0  0.0 - 0.6 % Final   • Neutrophils, Absolute 06/11/2018 1.80  1.50 - 8.30 10*3/mm3 Final   • Lymphocytes,  Absolute 06/11/2018 0.90  0.60 - 4.80 10*3/mm3 Final   • Monocytes, Absolute 06/11/2018 0.43  0.00 - 1.00 10*3/mm3 Final   • Eosinophils, Absolute 06/11/2018 0.12  0.00 - 0.30 10*3/mm3 Final   • Basophils, Absolute 06/11/2018 0.05  0.00 - 0.20 10*3/mm3 Final   • Immature Grans, Absolute 06/11/2018 0.00  0.00 - 0.03 10*3/mm3 Final         Assessment/Plan     Problem List Items Addressed This Visit        Nervous and Auditory    Multiple sclerosis (CMS/HCC) - Primary    Current Assessment & Plan     Restarted on Gilenya and tolerating without side effects.     CBC,CMP           Relevant Orders    CBC & Differential    Comprehensive Metabolic Panel    Neuropathic pain    Current Assessment & Plan     Continue Lyrica 200 mg BID                   No Follow-up on file.

## 2018-10-15 RX ORDER — FINGOLIMOD HCL 0.5 MG/1
CAPSULE ORAL
Qty: 30 CAPSULE | Refills: 2 | Status: SHIPPED | OUTPATIENT
Start: 2018-10-15 | End: 2019-02-26

## 2018-11-21 ENCOUNTER — TELEPHONE (OUTPATIENT)
Dept: NEUROLOGY | Facility: CLINIC | Age: 53
End: 2018-11-21

## 2018-11-21 NOTE — TELEPHONE ENCOUNTER
Rx for Provigil 100 mg PO PRN quantity 30 with no refills called into [facility] [employer]. Pt called and notified. Pt verbalized understanding and appreciation.     ----- Message from Windy Quiñones sent at 11/21/2018 12:22 PM EST -----  Regarding: REFILL  Contact: [phone]  Please refill provigil 100 mg    [facility]

## 2018-12-03 ENCOUNTER — TELEPHONE (OUTPATIENT)
Dept: NEUROLOGY | Facility: CLINIC | Age: 53
End: 2018-12-03

## 2018-12-03 NOTE — TELEPHONE ENCOUNTER
Insurance denied Modafinil. Please advise if there is something else the patient can take instead or would you like to appeal this determination. Thanks!!

## 2018-12-04 NOTE — TELEPHONE ENCOUNTER
Called patient, spoke with the patient, patient is aware of the provider instructions. Patient verbalized understanding.

## 2018-12-07 NOTE — TELEPHONE ENCOUNTER
Please send in the script for the patient once the Uli has been reviewed and signed off on. Thanks!

## 2019-02-26 ENCOUNTER — SPECIALTY PHARMACY (OUTPATIENT)
Dept: ONCOLOGY | Facility: HOSPITAL | Age: 54
End: 2019-02-26

## 2019-02-26 RX ORDER — FINGOLIMOD HYDROCHLORIDE 0.5 MG/1
0.5 CAPSULE ORAL DAILY
Qty: 30 CAPSULE | Refills: 11 | Status: SHIPPED | OUTPATIENT
Start: 2019-02-26 | End: 2019-02-26 | Stop reason: SDUPTHER

## 2019-02-26 RX ORDER — FINGOLIMOD HCL 0.5 MG/1
CAPSULE ORAL
Qty: 30 CAPSULE | Refills: 2 | OUTPATIENT
Start: 2019-02-26

## 2019-02-26 RX ORDER — FINGOLIMOD HYDROCHLORIDE 0.5 MG/1
0.5 CAPSULE ORAL DAILY
Qty: 30 CAPSULE | Refills: 11 | Status: SHIPPED | OUTPATIENT
Start: 2019-02-26 | End: 2020-02-13 | Stop reason: SDUPTHER

## 2019-03-22 ENCOUNTER — LAB (OUTPATIENT)
Dept: LAB | Facility: HOSPITAL | Age: 54
End: 2019-03-22

## 2019-03-22 ENCOUNTER — OFFICE VISIT (OUTPATIENT)
Dept: NEUROLOGY | Facility: CLINIC | Age: 54
End: 2019-03-22

## 2019-03-22 VITALS
WEIGHT: 150 LBS | HEART RATE: 60 BPM | BODY MASS INDEX: 28.32 KG/M2 | HEIGHT: 61 IN | DIASTOLIC BLOOD PRESSURE: 72 MMHG | SYSTOLIC BLOOD PRESSURE: 104 MMHG | RESPIRATION RATE: 18 BRPM

## 2019-03-22 DIAGNOSIS — G35 MULTIPLE SCLEROSIS (HCC): Primary | ICD-10-CM

## 2019-03-22 DIAGNOSIS — G35 MULTIPLE SCLEROSIS (HCC): ICD-10-CM

## 2019-03-22 DIAGNOSIS — M79.2 NEUROPATHIC PAIN: ICD-10-CM

## 2019-03-22 DIAGNOSIS — R13.11 ORAL PHASE DYSPHAGIA: ICD-10-CM

## 2019-03-22 DIAGNOSIS — R53.82 CHRONIC FATIGUE: ICD-10-CM

## 2019-03-22 LAB
ALBUMIN SERPL-MCNC: 4.9 G/DL (ref 3.2–4.8)
ALBUMIN/GLOB SERPL: 2.7 G/DL (ref 1.5–2.5)
ALP SERPL-CCNC: 96 U/L (ref 25–100)
ALT SERPL W P-5'-P-CCNC: 45 U/L (ref 7–40)
ANION GAP SERPL CALCULATED.3IONS-SCNC: 6 MMOL/L (ref 3–11)
AST SERPL-CCNC: 41 U/L (ref 0–33)
BASOPHILS # BLD AUTO: 0.03 10*3/MM3 (ref 0–0.2)
BASOPHILS NFR BLD AUTO: 1.1 % (ref 0–1)
BILIRUB SERPL-MCNC: 0.5 MG/DL (ref 0.3–1.2)
BUN BLD-MCNC: 16 MG/DL (ref 9–23)
BUN/CREAT SERPL: 19.3 (ref 7–25)
CALCIUM SPEC-SCNC: 9.9 MG/DL (ref 8.7–10.4)
CHLORIDE SERPL-SCNC: 105 MMOL/L (ref 99–109)
CO2 SERPL-SCNC: 28 MMOL/L (ref 20–31)
CREAT BLD-MCNC: 0.83 MG/DL (ref 0.6–1.3)
DEPRECATED RDW RBC AUTO: 52.7 FL (ref 37–54)
EOSINOPHIL # BLD AUTO: 0.06 10*3/MM3 (ref 0–0.3)
EOSINOPHIL NFR BLD AUTO: 2.2 % (ref 0–3)
ERYTHROCYTE [DISTWIDTH] IN BLOOD BY AUTOMATED COUNT: 14.2 % (ref 11.3–14.5)
GFR SERPL CREATININE-BSD FRML MDRD: 71 ML/MIN/1.73
GLOBULIN UR ELPH-MCNC: 1.8 GM/DL
GLUCOSE BLD-MCNC: 97 MG/DL (ref 70–100)
HCT VFR BLD AUTO: 39.5 % (ref 34.5–44)
HGB BLD-MCNC: 12.6 G/DL (ref 11.5–15.5)
IMM GRANULOCYTES # BLD AUTO: 0.01 10*3/MM3 (ref 0–0.05)
IMM GRANULOCYTES NFR BLD AUTO: 0.4 % (ref 0–0.6)
LYMPHOCYTES # BLD AUTO: 0.5 10*3/MM3 (ref 0.6–4.8)
LYMPHOCYTES NFR BLD AUTO: 18.3 % (ref 24–44)
MCH RBC QN AUTO: 32.4 PG (ref 27–31)
MCHC RBC AUTO-ENTMCNC: 31.9 G/DL (ref 32–36)
MCV RBC AUTO: 101.5 FL (ref 80–99)
MONOCYTES # BLD AUTO: 0.44 10*3/MM3 (ref 0–1)
MONOCYTES NFR BLD AUTO: 16.1 % (ref 0–12)
NEUTROPHILS # BLD AUTO: 1.7 10*3/MM3 (ref 1.5–8.3)
NEUTROPHILS NFR BLD AUTO: 62.3 % (ref 41–71)
PLATELET # BLD AUTO: 181 10*3/MM3 (ref 150–450)
PMV BLD AUTO: 12.6 FL (ref 6–12)
POTASSIUM BLD-SCNC: 4.9 MMOL/L (ref 3.5–5.5)
PROT SERPL-MCNC: 6.7 G/DL (ref 5.7–8.2)
RBC # BLD AUTO: 3.89 10*6/MM3 (ref 3.89–5.14)
SODIUM BLD-SCNC: 139 MMOL/L (ref 132–146)
WBC NRBC COR # BLD: 2.73 10*3/MM3 (ref 3.5–10.8)

## 2019-03-22 PROCEDURE — 36415 COLL VENOUS BLD VENIPUNCTURE: CPT

## 2019-03-22 PROCEDURE — 85025 COMPLETE CBC W/AUTO DIFF WBC: CPT

## 2019-03-22 PROCEDURE — 80053 COMPREHEN METABOLIC PANEL: CPT

## 2019-03-22 PROCEDURE — 99214 OFFICE O/P EST MOD 30 MIN: CPT | Performed by: PSYCHIATRY & NEUROLOGY

## 2019-03-22 RX ORDER — ROSUVASTATIN CALCIUM 10 MG/1
10 TABLET, COATED ORAL NIGHTLY
Refills: 0 | COMMUNITY
Start: 2019-03-17

## 2019-03-22 RX ORDER — MODAFINIL 200 MG/1
200 TABLET ORAL DAILY PRN
Qty: 30 TABLET | Refills: 5 | Status: SHIPPED | OUTPATIENT
Start: 2019-03-22 | End: 2020-05-06 | Stop reason: SDUPTHER

## 2019-03-22 NOTE — PROGRESS NOTES
Subjective   Patient ID: Mckenna Castellano is a 54 y.o. female     Chief Complaint   Patient presents with   • Multiple Sclerosis     MS CLINIC        History of Present Illness    54 y.o. female returns in follow up for RRMS, neuropathic pain and fatigue.  Last visit on 5/21/18 continued Gilenya, Lyrica, started Provigil and ordered labs.        RRMS    MS[facility] T25FW 6.64 sec, SLS 20 sec meka, 9 hole peg R 19.85 sec, L 16 sec; SDMT 46    MRI Brain, 5/1/18 few scattered T2 lesions, PVWM and medulla  without new enlarging or enhancing lesions    Labs - 9/21/18 ALT 46; AST 40, CBC NCS     Tolerating Gilenya without side effects.     Increased bladder or frequency in last few months.     Neuropathic pain     Lyrica 200 mg qhs prn.  Feet/hands develop B/N/T improved at night with Lyrica.     Fatigue     Fatigue is moderate.  Heat intolerance is moderate.  Naps occasionally.      Dysphagia    Trouble swallowing liquids and coughing and choking.      Problem history:    Developed bilateral LE numbness.  Then progressed to left leg up to Face and arm.  Left sided increased sensitivity to touch.  Sx lasted for 3 months.  GBP used to improve pain.  No other relapses since first event.      Started on Aubagio in 10/2017.  Noted mild hair loss after starting.  Denies new or worsening sx.  Heat intolerance is moderate.  Fatigue is mild to moderate.  Balance is off and left leg is weaker.  ST memory decreasing.  Bladder frequency and urgency.      Feet have N/T at bedtime.   mg qhs wears off and does not last all night.        Reviewed previous medical records:     Dx with MS in 2005 by Dr Benitez Fraser.  Onset had numbness in both legs, then LE F/A/L N and allodynia.  Treated with interferon for 2 months, N/V.  Switched to Copaxone for 4 -5 years.  Then to Gilenya.  Tolerating Gilenya without noted dz activity.  Moved to [facility] due to low counts.  During switch to Aubagio noted new left medullary lesion with left leg weakness.   Stopped Aubagio due to insurance issues.      MRI Brain - 10/2/17 new area of enhancement in right medulla, moderate T2 lesions    Past Medical History:   Diagnosis Date   • Anxiety    • Depression    • Hyperlipidemia    • Migraine    • MS (multiple sclerosis) (CMS/Formerly [facility])      Family History   Adopted: Yes   Problem Relation Age of Onset   • Breast cancer Neg Hx    • Ovarian cancer Neg Hx      Social History     Socioeconomic History   • Marital status:      Spouse name: Not on file   • Number of children: Not on file   • Years of education: Not on file   • Highest education level: Not on file   Tobacco Use   • Smoking status: Never Smoker   Substance and Sexual Activity   • Alcohol use: No   • Drug use: No   • Sexual activity: Defer       Review of Systems   Constitutional: Negative for activity change and unexpected weight change.   HENT: Negative for tinnitus and trouble swallowing.    Eyes: Positive for visual disturbance. Negative for photophobia.   Respiratory: Negative for apnea and choking.    Cardiovascular: Negative for leg swelling.   Endocrine: Positive for heat intolerance. Negative for cold intolerance.   Genitourinary: Positive for frequency and urgency. Negative for difficulty urinating and menstrual problem.   Musculoskeletal: Negative for back pain and gait problem.   Skin: Negative for color change.   Allergic/Immunologic: Negative for immunocompromised state.   Neurological: Negative for dizziness, tremors, seizures, syncope, facial asymmetry, speech difficulty and light-headedness.   Hematological: Negative for adenopathy. Does not bruise/bleed easily.   Psychiatric/Behavioral: Positive for decreased concentration. Negative for behavioral problems, confusion, hallucinations and sleep disturbance.       Objective     Vitals:    03/22/19 1024   BP: 104/72   BP Location: Right arm   Patient Position: Sitting   Cuff Size: Adult   Pulse: 60   Resp: 18   Weight: 68 kg (150 lb)   Height: 154.9 cm  "(61\")       Neurologic Exam     Mental Status   Registration: recalls 3 of 3 objects. Recall at 5 minutes: recalls 3 of 3 objects. Follows 3 step commands.   Attention: normal. Concentration: normal.   Level of consciousness: alert  Knowledge: good and consistent with education.   Able to name object. Able to read. Able to repeat. Able to write. Normal comprehension.     Cranial Nerves     CN II   Visual fields full to confrontation.   Visual acuity: normal  Right visual field deficit: none  Left visual field deficit: none     CN III, IV, VI   Right pupil: Shape: regular. Reactivity: brisk. Consensual response: intact.   Left pupil: Shape: regular. Reactivity: brisk. Consensual response: intact.   Nystagmus: none   Diplopia: none  Ophthalmoparesis: none  Upgaze: normal  Downgaze: normal  Conjugate gaze: present  Vestibulo-ocular reflex: present    CN V   Facial sensation intact.   Right corneal reflex: normal  Left corneal reflex: normal    CN VII   Right facial weakness: none  Left facial weakness: none    CN VIII   Hearing: intact    CN IX, X   Palate: symmetric  Right gag reflex: normal  Left gag reflex: normal    CN XI   Right sternocleidomastoid strength: normal  Left sternocleidomastoid strength: normal    CN XII   Tongue: not atrophic  Fasciculations: absent  Tongue deviation: none    Motor Exam   Muscle bulk: normal  Overall muscle tone: normal  Right arm tone: normal  Left arm tone: normal  Right leg tone: normal  Left leg tone: normal    Sensory Exam   Light touch normal.   Vibration normal.   Proprioception normal.   Pinprick normal.     Gait, Coordination, and Reflexes     Tremor   Resting tremor: absent  Intention tremor: absent  Action tremor: absent    Reflexes   Reflexes 2+ except as noted.       Physical Exam   Constitutional: She appears well-developed and well-nourished.   Nursing note and vitals reviewed.      Lab Requisition on 09/21/2018   Component Date Value Ref Range Status   • Glucose " 09/21/2018 95  70 - 100 mg/dL Final   • BUN 09/21/2018 20  9 - 23 mg/dL Final   • Creatinine 09/21/2018 0.82  0.60 - 1.30 mg/dL Final   • Sodium 09/21/2018 146  132 - 146 mmol/L Final   • Potassium 09/21/2018 5.0  3.5 - 5.5 mmol/L Final   • Chloride 09/21/2018 104  99 - 109 mmol/L Final   • CO2 09/21/2018 30.0  20.0 - 31.0 mmol/L Final   • Calcium 09/21/2018 10.0  8.7 - 10.4 mg/dL Final   • Total Protein 09/21/2018 6.7  5.7 - 8.2 g/dL Final   • Albumin 09/21/2018 4.98* 3.20 - 4.80 g/dL Final   • ALT (SGPT) 09/21/2018 46* 7 - 40 U/L Final   • AST (SGOT) 09/21/2018 40* 0 - 33 U/L Final   • Alkaline Phosphatase 09/21/2018 83  25 - 100 U/L Final   • Total Bilirubin 09/21/2018 0.4  0.3 - 1.2 mg/dL Final   • eGFR Non  Amer 09/21/2018 73  >60 mL/min/1.73 Final   • Globulin 09/21/2018 1.7  gm/dL Final   • A/G Ratio 09/21/2018 2.9* 1.5 - 2.5 g/dL Final   • BUN/Creatinine Ratio 09/21/2018 24.4  7.0 - 25.0 Final   • Anion Gap 09/21/2018 12.0* 3.0 - 11.0 mmol/L Final   • WBC 09/21/2018 2.71* 3.50 - 10.80 10*3/mm3 Final   • RBC 09/21/2018 3.97  3.89 - 5.14 10*6/mm3 Final   • Hemoglobin 09/21/2018 12.3  11.5 - 15.5 g/dL Final   • Hematocrit 09/21/2018 38.1  34.5 - 44.0 % Final   • MCV 09/21/2018 96.0  80.0 - 99.0 fL Final   • MCH 09/21/2018 31.0  27.0 - 31.0 pg Final   • MCHC 09/21/2018 32.3  32.0 - 36.0 g/dL Final   • RDW 09/21/2018 13.8  11.3 - 14.5 % Final   • RDW-SD 09/21/2018 47.9  37.0 - 54.0 fl Final   • MPV 09/21/2018 13.2* 6.0 - 12.0 fL Final   • Platelets 09/21/2018 182  150 - 450 10*3/mm3 Final   • Neutrophil % 09/21/2018 71.7* 41.0 - 71.0 % Final   • Lymphocyte % 09/21/2018 12.9* 24.0 - 44.0 % Final   • Monocyte % 09/21/2018 12.9* 0.0 - 12.0 % Final   • Eosinophil % 09/21/2018 1.8  0.0 - 3.0 % Final   • Basophil % 09/21/2018 0.7  0.0 - 1.0 % Final   • Immature Grans % 09/21/2018 0.4  0.0 - 0.6 % Final   • Neutrophils, Absolute 09/21/2018 1.94  1.50 - 8.30 10*3/mm3 Final   • Lymphocytes, Absolute 09/21/2018  0.35* 0.60 - 4.80 10*3/mm3 Final   • Monocytes, Absolute 09/21/2018 0.35  0.00 - 1.00 10*3/mm3 Final   • Eosinophils, Absolute 09/21/2018 0.05  0.00 - 0.30 10*3/mm3 Final   • Basophils, Absolute 09/21/2018 0.02  0.00 - 0.20 10*3/mm3 Final   • Immature Grans, Absolute 09/21/2018 0.01  0.00 - 0.03 10*3/mm3 Final         Assessment/Plan     Problem List Items Addressed This Visit        Nervous and Auditory    Multiple sclerosis (CMS/HCC) - Primary    Current Assessment & Plan     [facility] reviewed and WNL    Continue Madan     Refer to PT for left foot catching the ground    MRI Brain and labs          Relevant Orders    Ambulatory Referral to Physical Therapy Evaluate and treat    CBC & Differential    Comprehensive Metabolic Panel    MRI Brain With & Without Contrast    Neuropathic pain    Current Assessment & Plan     Lyrica at hs controlling pain             Other    Chronic fatigue    Current Assessment & Plan     Renew provigil 200 mg qam                   No Follow-up on file.

## 2019-03-22 NOTE — ASSESSMENT & PLAN NOTE
MS[facility] reviewed and WNL    Continue Madan     Refer to PT for left foot catching the ground    MRI Brain and labs

## 2019-03-26 ENCOUNTER — HOSPITAL ENCOUNTER (OUTPATIENT)
Dept: PHYSICAL THERAPY | Facility: HOSPITAL | Age: 54
Setting detail: THERAPIES SERIES
Discharge: HOME | End: 2019-03-26

## 2019-03-26 DIAGNOSIS — G35 MULTIPLE SCLEROSIS (HCC): Primary | ICD-10-CM

## 2019-03-26 PROCEDURE — 97161 PT EVAL LOW COMPLEX 20 MIN: CPT | Performed by: PHYSICAL THERAPIST

## 2019-03-26 NOTE — THERAPY EVALUATION
.Outpatient Physical Therapy Neuro Initial Evaluation  [facility]     Patient Name: Mckenna Castellano  : 1965  MRN: [id number]  Today's Date: 3/26/2019      Visit Date: 2019    Patient Active Problem List   Diagnosis   • Multiple sclerosis (CMS/HCC)   • Neuropathic pain   • Chronic fatigue        Past Medical History:   Diagnosis Date   • Anxiety    • Depression    • Hyperlipidemia    • Migraine    • MS (multiple sclerosis) (CMS/HCC)         Past Surgical History:   Procedure Laterality Date   • AUGMENTATION MAMMAPLASTY Bilateral    • HYSTERECTOMY N/A    • TONSILLECTOMY           Visit Dx:     ICD-10-CM ICD-9-CM   1. Multiple sclerosis (CMS/HCC) G35 340       Patient History     Row Name 19 1345             History    Chief Complaint  Balance Problems;Difficulty Walking  -      Date Current Problem(s) Began  04  -      Brief Description of Current Complaint  pt relates she was dx with MS in . She states that she is doing pretty well overall. Heat has an extreme negative affect on her body. She states her L leg is weaker than her R and it has progressively become worse over the course of the year. She states she fell and broke her foot last summer, which has been her only fall. She would like to run on a TM, which she used to do on a regular basis. She denies pain in her foot, but her foot does go numb. She relates balance deficits on the left. She is unable to play tennis because she is scared to lose strength in her legs and trip. Pt states she randomly will scuff her L toe going up stairs at home and at airports.   -KL      Patient/Caregiver Goals  Improve mobility;Improve strength  -KL      Current Tobacco Use  no  -KL      Hand Dominance  right-handed  -KL      Occupation/sports/leisure activities  traveling, exercising   -KL         Pain     Pain at Present  0  -KL      Pain at Best  0  -KL      Pain at Worst  0  -KL         Fall Risk Assessment    Any falls in the past  year:  Yes  -KL         Daily Activities    Primary Language  English  -KL      Are you able to read  Yes  -KL      Are you able to write  Yes  -KL      Pt Participated in POC and Goals  Yes  -KL         Safety    Are you being hurt, hit, or frightened by anyone at home or in your life?  No  -KL        User Key  (r) = Recorded By, (t) = Taken By, (c) = Cosigned By    Initials Name Provider Type    Helen Armando, PT Physical Therapist              PT Neuro     Row Name 03/26/19 1345             Home Living    Living Arrangements  house  -KL      Home Accessibility  stairs within home  -KL         Vision-Basic Assessment    Current Vision  No visual deficits  -KL         Cognition    Overall Cognitive Status  WFL  -KL         Sensation    Sensation WNL?  WFL  -KL      Light Touch  -- occasional numbness in hands and feet with stress  -KL         Proprioception    Proprioception  intact  -KL         Posture/Observations    Posture/Observations Comments  upright posture  -KL         General ROM    GENERAL ROM COMMENTS  UE and LE AROM is WNL  -KL         MMT (Manual Muscle Testing)    Rt Lower Ext  Rt Hip Flexion;Rt Hip Extension;Rt Hip ABduction;Rt Hip ADduction;Rt Knee Extension;Rt Knee Flexion;Rt Ankle Dorsiflexion;Rt Ankle Plantarflexion  -KL      Lt Lower Ext  Lt Hip Flexion;Lt Hip Extension;Lt Hip ABduction;Lt Hip ADduction;Lt Knee Extension;Lt Knee Flexion;Lt Ankle Dorsiflexion;Lt Ankle Subtalar Inversion;Lt Ankle Subtalar Eversion;Lt Ankle Plantarflexion  -KL         MMT Right Lower Ext    Rt Hip Flexion MMT, Gross Movement  (5/5) normal  -KL      Rt Hip Extension MMT, Gross Movement  (5/5) normal  -KL      Rt Hip ABduction MMT, Gross Movement  (5/5) normal  -KL      Rt Hip ADduction MMT, Gross Movement  (4+/5) good plus  -KL      Rt Knee Extension MMT, Gross Movement  (5/5) normal  -KL      Rt Knee Flexion MMT, Gross Movement  (5/5) normal  -KL      Rt Ankle Plantarflexion MMT, Gross Movement  (5/5)  normal  -KL      Rt Ankle Dorsiflexion MMT, Gross Movement  (5/5) normal  -KL         MMT Left Lower Ext    Lt Hip Flexion MMT, Gross Movement  (4+/5) good plus  -KL      Lt Hip Extension MMT, Gross Movement  (4+/5) good plus  -KL      Lt Hip ABduction MMT, Gross Movement  (4+/5) good plus  -KL      Lt Hip ADduction MMT, Gross Movement  (4+/5) good plus  -KL      Lt Knee Extension MMT, Gross Movement  (5/5) normal  -KL      Lt Knee Flexion MMT, Gross Movement  (5/5) normal  -KL      Lt Ankle Plantarflexion MMT, Gross Movement  (5/5) normal  -KL      Lt Ankle Dorsiflexion MMT, Gross Movement  (4/5) good  -KL      Lt Ankle Subtalar Inversion MMT, Gross Movement  (5/5) normal  -KL      Lt Ankle Subtalar Eversion MMT, Gross Movement  (4/5) good  -KL         Bed Mobility Assessment/Treatment    Comment (Bed Mobility)  Independent   -KL         Transfers    Comment (Transfers)  Independent   -KL         Gait/Stairs Assessment/Training    Comment (Gait/Stairs)  Normalized gait, however with continued walking - L LE scuffing noted   -KL         Balance Skills Training    SLS  < 10 sec on L   -KL      Balance Comments  tandem < 20 sec with R in front of L   -KL        User Key  (r) = Recorded By, (t) = Taken By, (c) = Cosigned By    Initials Name Provider Type    Helen Armando, PT Physical Therapist                  Therapy Education  Education Details: jump rope, heel raises, SLS, tandem and 4 way ankle with GTB   Given: HEP  Program: New  How Provided: Verbal, Demonstration, Written  Provided to: Patient  Level of Understanding: Verbalized, Demonstrated    PT OP Goals     Row Name 03/26/19 1345          PT Short Term Goals    STG Date to Achieve  04/09/19  -KL     STG 1  Patient will report compliance with initial HEP.  -KL     STG 1 Progress  New  -        Long Term Goals    LTG Date to Achieve  04/23/19  -KL     LTG 1  Patient will be I with final HEP.  -KL     LTG 1 Progress  New  -KL     LTG 2  Patient to  improve mini-BEST test balance score to >/= 28/28 to decrease client's risk of falls.  -     LTG 3  Patient will demonstrate improved LLE global strength by at least 1/2 muscle grade.   -     LTG 3 Progress  New  -        Time Calculation    PT Goal Re-Cert Due Date  06/24/19  -       User Key  (r) = Recorded By, (t) = Taken By, (c) = Cosigned By    Initials Name Provider Type     Helen Hathaway, PT Physical Therapist          PT Assessment/Plan     Row Name 03/26/19 1534          PT Assessment    Functional Limitations  Decreased safety during functional activities;Limitations in community activities;Performance in leisure activities  -     Impairments  Balance;Endurance;Gait;Muscle strength  -     Assessment Comments  Patient is a 54 YOF that presents with LLE weakness related to MS. She demonstrates weak L foot specifically, as well as balance impairments on altered surfaces, along with low endurance. She will require skilled PT intervention to address deficits in order to improve function and strength.   -     Please refer to paper survey for additional self-reported information  Yes  -KL     Rehab Potential  Good  -KL     Patient/caregiver participated in establishment of treatment plan and goals  Yes  -KL     Patient would benefit from skilled therapy intervention  Yes  -KL        PT Plan    PT Frequency  1x/week  -     Predicted Duration of Therapy Intervention (Therapy Eval)  4 visits   -     Planned CPT's?  PT EVAL LOW COMPLEXITY: 36051;PT THER PROC EA 15 MIN: 31265;PT THER ACT EA 15 MIN: 48262;PT GAIT TRAINING EA 15 MIN: 96195;PT NEUROMUSC RE-EDUCATION EA 15 MIN: 38527;PT MANUAL THERAPY EA 15 MIN: 08670  -     PT Plan Comments  Patient will be seen 1x/wk x 4 wks with treatment to include strengthening, stretching, manual therapy, neuromuscular re-education, balance, gait and endurance training.   -       User Key  (r) = Recorded By, (t) = Taken By, (c) = Cosigned By    Initials  Name Provider Type    Helen Armando, PT Physical Therapist                             Outcome Measure Options: FGA (Functional Gait Assessment), Mini-Best Test  Functional Gait Assessment (FGA)  Gait Level Surface: Normal  Change in Gait Speed: Normal  Gait with Horizontal Head Turns: Normal  Gait with Vertical Head Turns: Normal  Gait and Pivot Turn: Normal  Step Over Obstacle: Normal  Gait with Narrow Base of Support: Normal  Gait with Eyes Closed: Normal  Ambulating Backwards: Normal  Steps: Normal  FGA Total Score: 30  Mini Best  Mini Best Score/Comments: 27/28    Time Calculation:   Start Time: 1345   Therapy Charges for Today     Code Description Service Date Service Provider Modifiers Qty    67366933876 HC PT EVAL LOW COMPLEXITY 4 3/26/2019 Helen Hathaway, PT GP 1          PT G-Codes  Outcome Measure Options: FGA (Functional Gait Assessment), Mini-Best Test  FGA Total Score: 30         Helen Hathaway, PT  3/26/2019

## 2019-04-03 ENCOUNTER — HOSPITAL ENCOUNTER (OUTPATIENT)
Dept: GENERAL RADIOLOGY | Facility: HOSPITAL | Age: 54
Discharge: HOME | End: 2019-04-03
Admitting: PSYCHIATRY & NEUROLOGY

## 2019-04-03 DIAGNOSIS — R13.10 DYSPHAGIA, UNSPECIFIED TYPE: ICD-10-CM

## 2019-04-03 PROCEDURE — 74230 X-RAY XM SWLNG FUNCJ C+: CPT

## 2019-04-03 PROCEDURE — 92611 MOTION FLUOROSCOPY/SWALLOW: CPT

## 2019-04-03 RX ADMIN — BARIUM SULFATE 100 ML: 0.81 POWDER, FOR SUSPENSION ORAL at 11:15

## 2019-04-03 RX ADMIN — BARIUM SULFATE 20 ML: 400 PASTE ORAL at 11:15

## 2019-04-03 NOTE — MBS/VFSS/FEES
Outpatient Speech Language Pathology   Adult Swallow Initial Evaluation  [facility]   Modified Barium Swallow Study (MBS)     Patient Name: Mckenna Castellano  : 1965  MRN: [id number]  Today's Date: 4/3/2019         Visit Date: 2019   Patient Active Problem List   Diagnosis   • Multiple sclerosis (CMS/HCC)   • Neuropathic pain   • Chronic fatigue        Past Medical History:   Diagnosis Date   • Anxiety    • Depression    • Hyperlipidemia    • Migraine    • MS (multiple sclerosis) (CMS/HCC)         Past Surgical History:   Procedure Laterality Date   • AUGMENTATION MAMMAPLASTY Bilateral    • HYSTERECTOMY N/A    • TONSILLECTOMY           Visit Dx:     ICD-10-CM ICD-9-CM   1. Dysphagia, unspecified type R13.10 787.20           SLP Adult Swallow Evaluation - 19 1100        Rehab Evaluation    Document Type  evaluation   -RD    Subjective Information  no complaints   -RD    Patient Observations  alert;cooperative;agree to therapy   -RD    Patient Effort  excellent   -RD       General Information    Patient Profile Reviewed  yes   -RD    Pertinent History Of Current Problem  Pt presents for OP MBS given complaints of choking episodes. Pt reports choking typically occurs during laughing episodes, which results in difficulty breathing and choking to occur. Pt reports occasional episodes of choking on liquids, but not frequently. Pt reports previously seeing an ENT who prescribed Omeprazole 2' reflux. PMH includes: Multiple Sclerosis, Anxiety/depression, and migraines.    -RD    Current Method of Nutrition  regular textures;thin liquids   -RD    Precautions/Limitations, Vision  WFL;for purposes of eval   -RD    Precautions/Limitations, Hearing  WFL;for purposes of eval   -RD    Prior Level of Function-Communication  WFL   -RD    Prior Level of Function-Swallowing  no diet consistency restrictions;esophageal concerns   -RD    Plans/Goals Discussed with  patient;agreed upon   -RD    Barriers to Rehab   none identified   -RD    Patient's Goals for Discharge  eat/drink without coughing/choking   -RD       Pain Assessment    Additional Documentation  Pain Scale: Numbers Pre/Post-Treatment (Group)   -RD       Pain Scale: Numbers Pre/Post-Treatment    Pain Scale: Numbers, Pretreatment  0/10 - no pain   -RD    Pain Scale: Numbers, Post-Treatment  0/10 - no pain   -RD       MBS/VFSS    Utensils Used  spoon;cup;straw   -RD       MBS/VFSS Interpretation    VFSS Summary  Pt presents w/ functional oropharyngeal swallow. Adequate mastication of regular solid. Mild oral residue w/ pudding and solids that pt cleared w/ spontaneous secondary swallow. No penetration or aspiration w/ any trial or consistency. No significant pharyngeal residue present. Pt noted w/ prominent cricopharyngeus, but this did not appear to impact general flow of bolus. No retrograde flow of material observed during exam.    -RD       Clinical Impression    SLP Swallowing Diagnosis  functional oral phase;functional pharyngeal phase   -RD    Functional Impact  no impact on function   -RD    Rehab Potential/Prognosis, Swallowing  good, to achieve stated therapy goals   -RD    Swallow Criteria for Skilled Therapeutic Interventions Met  no problems identified which require skilled intervention   -RD       Recommendations    Therapy Frequency (Swallow)  evaluation only   -RD    SLP Diet Recommendation 1. regular textures  2. thin liquids   -RD    Recommended Precautions and Strategies 3. upright posture during/after eating  4. fatigue precautions (2' MS)  5. reflux precautions  6. Oral care BID/PRN   -RD    SLP Rec. for Method of Medication Administration 7. meds whole with thin liquids as tolerated   -RD    Anticipated Dischage Disposition  home   -RD      User Key  (r) = Recorded By, (t) = Taken By, (c) = Cosigned By    Initials Name Provider Type    Quyen Torres MS CCC-SLP Speech and Language Pathologist                        OP SLP Education      Row Name 04/03/19 1507       Education    Barriers to Learning  No barriers identified  -RD    Education Provided  Described results of evaluation;Patient expressed understanding of evaluation  -RD    Assessed  Learning needs;Learning motivation;Learning preferences;Learning readiness  -RD    Learning Motivation  Strong  -RD    Learning Method  Explanation;Teach back  -RD    Teaching Response  Verbalized understanding  -RD      User Key  (r) = Recorded By, (t) = Taken By, (c) = Cosigned By    Initials Name Effective Dates    Quyen Torres MS CCC-SLP 04/03/18 -                       SLP Outcome Measures (last 72 hours)      SLP Outcome Measures     Row Name 04/03/19 1100             SLP Outcome Measures    Outcome Measure Used?  Adult NOMS  -RD         Adult FCM Scores    FCM Chosen  Swallowing  -RD      Swallowing FCM Score  7  -RD        User Key  (r) = Recorded By, (t) = Taken By, (c) = Cosigned By    Initials Name Effective Dates    Quyen Torres MS CCC-SLP 04/03/18 -                Time Calculation:   SLP Start Time: 1100    Therapy Charges for Today     Code Description Service Date Service Provider Modifiers Qty    72789680944 HC ST MOTION FLUORO EVAL SWALLOW 5 4/3/2019 Quyen Farias MS CCC-SLP GN 1                   Quyen Farias MS CCC-SLP  4/3/2019

## 2019-04-16 ENCOUNTER — DOCUMENTATION (OUTPATIENT)
Dept: PHYSICAL THERAPY | Facility: HOSPITAL | Age: 54
End: 2019-04-16

## 2019-04-16 DIAGNOSIS — G35 MULTIPLE SCLEROSIS (HCC): Primary | ICD-10-CM

## 2019-04-16 NOTE — THERAPY DISCHARGE NOTE
Outpatient Physical Therapy Discharge Summary         Patient Name: Mckenna Castellano  : 1965  MRN: [id number]    Today's Date: 2019    Visit Dx:    ICD-10-CM ICD-9-CM   1. Multiple sclerosis (CMS/HCC) G35 340       PT OP Goals     Row Name 19 1407          PT Short Term Goals    STG Date to Achieve  19  -KL     STG 1  Patient will report compliance with initial HEP.  -KL     STG 1 Progress  Not Met  -KL        Long Term Goals    LTG Date to Achieve  19  -KL     LTG 1  Patient will be I with final HEP.  -KL     LTG 1 Progress  Not Met  -KL     LTG 2  Patient to improve mini-BEST test balance score to >/= 28/28 to decrease client's risk of falls.  -KL     LTG 2 Progress  Not Met  -KL     LTG 3  Patient will demonstrate improved LLE global strength by at least 1/2 muscle grade.   -KL     LTG 3 Progress  Not Met  -KL       User Key  (r) = Recorded By, (t) = Taken By, (c) = Cosigned By    Initials Name Provider Type    Helen Armando, PT Physical Therapist          OP PT Discharge Summary  Date of Discharge: 19  Reason for Discharge: Non-compliant  Outcomes Achieved: Unable to make functional progress toward goals at this time  Discharge Destination: Home with home program  Discharge Instructions/Additional Comments: Patient with 2 consecutive no shows. She will be discharged at this time due to attendance policy.       Time Calculation:                    Helen Hathaway PT  2019

## 2019-04-17 ENCOUNTER — LAB (OUTPATIENT)
Dept: LAB | Facility: HOSPITAL | Age: 54
End: 2019-04-17

## 2019-04-17 ENCOUNTER — OFFICE VISIT (OUTPATIENT)
Dept: NEUROLOGY | Facility: CLINIC | Age: 54
End: 2019-04-17

## 2019-04-17 VITALS
OXYGEN SATURATION: 99 % | HEART RATE: 52 BPM | SYSTOLIC BLOOD PRESSURE: 104 MMHG | DIASTOLIC BLOOD PRESSURE: 66 MMHG | HEIGHT: 61 IN | BODY MASS INDEX: 27.19 KG/M2 | RESPIRATION RATE: 16 BRPM | WEIGHT: 144 LBS

## 2019-04-17 DIAGNOSIS — D64.89 ANEMIA DUE TO OTHER CAUSE, NOT CLASSIFIED: ICD-10-CM

## 2019-04-17 DIAGNOSIS — R53.82 CHRONIC FATIGUE: ICD-10-CM

## 2019-04-17 DIAGNOSIS — G35 MULTIPLE SCLEROSIS (HCC): Primary | ICD-10-CM

## 2019-04-17 DIAGNOSIS — M79.2 NEUROPATHIC PAIN: ICD-10-CM

## 2019-04-17 PROBLEM — D64.9 ANEMIA: Status: ACTIVE | Noted: 2019-04-17

## 2019-04-17 LAB
FOLATE SERPL-MCNC: 13.4 NG/ML (ref 4.78–24.2)
IRON 24H UR-MRATE: 96 MCG/DL (ref 37–145)
IRON SATN MFR SERPL: 27 % (ref 20–50)
TIBC SERPL-MCNC: 358 MCG/DL (ref 298–536)
TRANSFERRIN SERPL-MCNC: 240 MG/DL (ref 200–360)
VIT B12 BLD-MCNC: 648 PG/ML (ref 211–946)

## 2019-04-17 PROCEDURE — 36415 COLL VENOUS BLD VENIPUNCTURE: CPT

## 2019-04-17 PROCEDURE — 82746 ASSAY OF FOLIC ACID SERUM: CPT

## 2019-04-17 PROCEDURE — 82607 VITAMIN B-12: CPT

## 2019-04-17 PROCEDURE — 84466 ASSAY OF TRANSFERRIN: CPT

## 2019-04-17 PROCEDURE — 83540 ASSAY OF IRON: CPT

## 2019-04-17 PROCEDURE — 99214 OFFICE O/P EST MOD 30 MIN: CPT | Performed by: PSYCHIATRY & NEUROLOGY

## 2019-04-17 NOTE — PROGRESS NOTES
Subjective   Patient ID: Mckenna Castellano is a 54 y.o. female     Chief Complaint   Patient presents with   • Multiple Sclerosis        History of Present Illness    54 y.o. female returns in follow up for RRMS, neuropathic pain and fatigue.  Last visit on 3/22/19 continued Gilenya, Lyrica,  Provigil  , referred to PT and swallow study, ordered MRI Brain and labs.        Swallow study - unremarkable    RRMS    MRI Brain, my review of films, 4/2/19 as compared to 5/1/18 few scattered T2 lesions, PVWM and medulla without new enlarging or enhancing lesions    Labs - 3/22/19 ALT 45; AST 41, .5     Tolerating Gilenya without side effects.        Neuropathic pain     Lyrica 200 mg qhs prn.  Feet/hands develop B/N/T improved at night with Lyrica.     Fatigue     Provigil prn for fatigue.     Fatigue is moderate.  Heat intolerance is moderate.  Naps occasionally.      Dysphagia    Trouble swallowing liquids and coughing and choking.      Problem history:    Developed bilateral LE numbness.  Then progressed to left leg up to Face and arm.  Left sided increased sensitivity to touch.  Sx lasted for 3 months.  GBP used to improve pain.  No other relapses since first event.      Started on Aubagio in 10/2017.  Noted mild hair loss after starting.  Denies new or worsening sx.  Heat intolerance is moderate.  Fatigue is mild to moderate.  Balance is off and left leg is weaker.  ST memory decreasing.  Bladder frequency and urgency.      Feet have N/T at bedtime.   mg qhs wears off and does not last all night.        Reviewed previous medical records:     Dx with MS in 2005 by Dr Benitez Fraser.  Onset had numbness in both legs, then LE F/A/L N and allodynia.  Treated with interferon for 2 months, N/V.  Switched to Copaxone for 4 -5 years.  Then to Gilenya.  Tolerating Gilenya without noted dz activity.  Moved to [facility] due to low counts.  During switch to Aubagio noted new left medullary lesion with left leg weakness.   Stopped Aubagio due to insurance issues.      MRI Brain - 10/2/17 new area of enhancement in right medulla, moderate T2 lesions    Past Medical History:   Diagnosis Date   • Anxiety    • Depression    • Hyperlipidemia    • Migraine    • MS (multiple sclerosis) (CMS/[facility])      Family History   Adopted: Yes   Problem Relation Age of Onset   • Breast cancer Neg Hx    • Ovarian cancer Neg Hx      Social History     Socioeconomic History   • Marital status:      Spouse name: Not on file   • Number of children: Not on file   • Years of education: Not on file   • Highest education level: Not on file   Tobacco Use   • Smoking status: Never Smoker   Substance and Sexual Activity   • Alcohol use: No   • Drug use: No   • Sexual activity: Defer       Review of Systems   Constitutional: Negative for activity change and unexpected weight change.   HENT: Negative for tinnitus and trouble swallowing.    Eyes: Positive for visual disturbance. Negative for photophobia.   Respiratory: Negative for apnea and choking.    Cardiovascular: Negative for leg swelling.   Endocrine: Positive for heat intolerance. Negative for cold intolerance.   Genitourinary: Positive for frequency and urgency. Negative for difficulty urinating and menstrual problem.   Musculoskeletal: Negative for back pain and gait problem.   Skin: Negative for color change.   Allergic/Immunologic: Negative for immunocompromised state.   Neurological: Negative for dizziness, tremors, seizures, syncope, facial asymmetry, speech difficulty and light-headedness.   Hematological: Negative for adenopathy. Does not bruise/bleed easily.   Psychiatric/Behavioral: Positive for decreased concentration. Negative for behavioral problems, confusion, hallucinations and sleep disturbance.       Objective     Vitals:    04/17/19 1104   BP: 104/66   BP Location: Right arm   Patient Position: Sitting   Cuff Size: Adult   Pulse: 52   Resp: 16   SpO2: 99%   Weight: 65.3 kg (144 lb)  "  Height: 154.9 cm (61\")       Neurologic Exam     Mental Status   Registration: recalls 3 of 3 objects. Recall at 5 minutes: recalls 3 of 3 objects. Follows 3 step commands.   Attention: normal. Concentration: normal.   Level of consciousness: alert  Knowledge: good and consistent with education.   Able to name object. Able to read. Able to repeat. Able to write. Normal comprehension.     Cranial Nerves     CN II   Visual fields full to confrontation.   Visual acuity: normal  Right visual field deficit: none  Left visual field deficit: none     CN III, IV, VI   Right pupil: Shape: regular. Reactivity: brisk. Consensual response: intact.   Left pupil: Shape: regular. Reactivity: brisk. Consensual response: intact.   Nystagmus: none   Diplopia: none  Ophthalmoparesis: none  Upgaze: normal  Downgaze: normal  Conjugate gaze: present  Vestibulo-ocular reflex: present    CN V   Facial sensation intact.   Right corneal reflex: normal  Left corneal reflex: normal    CN VII   Right facial weakness: none  Left facial weakness: none    CN VIII   Hearing: intact    CN IX, X   Palate: symmetric  Right gag reflex: normal  Left gag reflex: normal    CN XI   Right sternocleidomastoid strength: normal  Left sternocleidomastoid strength: normal    CN XII   Tongue: not atrophic  Fasciculations: absent  Tongue deviation: none    Motor Exam   Muscle bulk: normal  Overall muscle tone: normal  Right arm tone: normal  Left arm tone: normal  Right leg tone: normal  Left leg tone: normal    Sensory Exam   Light touch normal.   Vibration normal.   Proprioception normal.   Pinprick normal.     Gait, Coordination, and Reflexes     Tremor   Resting tremor: absent  Intention tremor: absent  Action tremor: absent    Reflexes   Reflexes 2+ except as noted.       Physical Exam   Constitutional: She appears well-developed and well-nourished.   Nursing note and vitals reviewed.      Lab on 03/22/2019   Component Date Value Ref Range Status   • " Glucose 03/22/2019 97  70 - 100 mg/dL Final   • BUN 03/22/2019 16  9 - 23 mg/dL Final   • Creatinine 03/22/2019 0.83  0.60 - 1.30 mg/dL Final   • Sodium 03/22/2019 139  132 - 146 mmol/L Final   • Potassium 03/22/2019 4.9  3.5 - 5.5 mmol/L Final   • Chloride 03/22/2019 105  99 - 109 mmol/L Final   • CO2 03/22/2019 28.0  20.0 - 31.0 mmol/L Final   • Calcium 03/22/2019 9.9  8.7 - 10.4 mg/dL Final   • Total Protein 03/22/2019 6.7  5.7 - 8.2 g/dL Final   • Albumin 03/22/2019 4.90* 3.20 - 4.80 g/dL Final   • ALT (SGPT) 03/22/2019 45* 7 - 40 U/L Final   • AST (SGOT) 03/22/2019 41* 0 - 33 U/L Final   • Alkaline Phosphatase 03/22/2019 96  25 - 100 U/L Final   • Total Bilirubin 03/22/2019 0.5  0.3 - 1.2 mg/dL Final   • eGFR Non African Amer 03/22/2019 71  >60 mL/min/1.73 Final   • Globulin 03/22/2019 1.8  gm/dL Final   • A/G Ratio 03/22/2019 2.7* 1.5 - 2.5 g/dL Final   • BUN/Creatinine Ratio 03/22/2019 19.3  7.0 - 25.0 Final   • Anion Gap 03/22/2019 6.0  3.0 - 11.0 mmol/L Final   • WBC 03/22/2019 2.73* 3.50 - 10.80 10*3/mm3 Final   • RBC 03/22/2019 3.89  3.89 - 5.14 10*6/mm3 Final   • Hemoglobin 03/22/2019 12.6  11.5 - 15.5 g/dL Final   • Hematocrit 03/22/2019 39.5  34.5 - 44.0 % Final   • MCV 03/22/2019 101.5* 80.0 - 99.0 fL Final   • MCH 03/22/2019 32.4* 27.0 - 31.0 pg Final   • MCHC 03/22/2019 31.9* 32.0 - 36.0 g/dL Final   • RDW 03/22/2019 14.2  11.3 - 14.5 % Final   • RDW-SD 03/22/2019 52.7  37.0 - 54.0 fl Final   • MPV 03/22/2019 12.6* 6.0 - 12.0 fL Final   • Platelets 03/22/2019 181  150 - 450 10*3/mm3 Final   • Neutrophil % 03/22/2019 62.3  41.0 - 71.0 % Final   • Lymphocyte % 03/22/2019 18.3* 24.0 - 44.0 % Final   • Monocyte % 03/22/2019 16.1* 0.0 - 12.0 % Final   • Eosinophil % 03/22/2019 2.2  0.0 - 3.0 % Final   • Basophil % 03/22/2019 1.1* 0.0 - 1.0 % Final   • Immature Grans % 03/22/2019 0.4  0.0 - 0.6 % Final   • Neutrophils, Absolute 03/22/2019 1.70  1.50 - 8.30 10*3/mm3 Final   • Lymphocytes, Absolute  03/22/2019 0.50* 0.60 - 4.80 10*3/mm3 Final   • Monocytes, Absolute 03/22/2019 0.44  0.00 - 1.00 10*3/mm3 Final   • Eosinophils, Absolute 03/22/2019 0.06  0.00 - 0.30 10*3/mm3 Final   • Basophils, Absolute 03/22/2019 0.03  0.00 - 0.20 10*3/mm3 Final   • Immature Grans, Absolute 03/22/2019 0.01  0.00 - 0.05 10*3/mm3 Final         Assessment/Plan     Problem List Items Addressed This Visit        Nervous and Auditory    Multiple sclerosis (CMS/HCC) - Primary    Current Assessment & Plan     Sx stable on Gilenya              Neuropathic pain    Current Assessment & Plan     Sx stable on Lyrica             Hematopoietic and Hemostatic    Anemia    Current Assessment & Plan     Drinking 8 oz of vodka a night    B12, folate and iron studies         Relevant Orders    Vitamin B12 & Folate    Iron Profile       Other    Chronic fatigue    Current Assessment & Plan     Sx improved with Provigil                   No Follow-up on file.

## 2019-04-23 ENCOUNTER — APPOINTMENT (OUTPATIENT)
Dept: PHYSICAL THERAPY | Facility: HOSPITAL | Age: 54
End: 2019-04-23

## 2019-05-20 ENCOUNTER — TRANSCRIBE ORDERS (OUTPATIENT)
Dept: ADMINISTRATIVE | Facility: HOSPITAL | Age: 54
End: 2019-05-20

## 2019-05-20 DIAGNOSIS — Z12.31 VISIT FOR SCREENING MAMMOGRAM: Primary | ICD-10-CM

## 2019-05-22 ENCOUNTER — HOSPITAL ENCOUNTER (OUTPATIENT)
Dept: MAMMOGRAPHY | Facility: HOSPITAL | Age: 54
Discharge: HOME | End: 2019-05-22
Admitting: INTERNAL MEDICINE

## 2019-05-22 DIAGNOSIS — Z12.31 VISIT FOR SCREENING MAMMOGRAM: ICD-10-CM

## 2019-05-22 PROCEDURE — 77063 BREAST TOMOSYNTHESIS BI: CPT

## 2019-05-22 PROCEDURE — 77067 SCR MAMMO BI INCL CAD: CPT | Performed by: RADIOLOGY

## 2019-05-22 PROCEDURE — 77063 BREAST TOMOSYNTHESIS BI: CPT | Performed by: RADIOLOGY

## 2019-05-22 PROCEDURE — 77067 SCR MAMMO BI INCL CAD: CPT

## 2019-07-16 RX ORDER — PREGABALIN 200 MG/1
200 CAPSULE ORAL 2 TIMES DAILY
Qty: 60 CAPSULE | Refills: 4 | Status: SHIPPED | OUTPATIENT
Start: 2019-07-16 | End: 2020-01-31 | Stop reason: SDUPTHER

## 2019-07-16 NOTE — TELEPHONE ENCOUNTER
----- Message from Windy Quiñones sent at 7/16/2019  2:44 PM EDT -----  Regarding: refill  Contact: [phone]  [facility]    Please refill lyrica 200 mg

## 2019-10-31 ENCOUNTER — OFFICE VISIT (OUTPATIENT)
Dept: NEUROLOGY | Facility: CLINIC | Age: 54
End: 2019-10-31

## 2019-10-31 VITALS
DIASTOLIC BLOOD PRESSURE: 70 MMHG | WEIGHT: 142 LBS | HEIGHT: 61 IN | SYSTOLIC BLOOD PRESSURE: 106 MMHG | OXYGEN SATURATION: 100 % | HEART RATE: 57 BPM | BODY MASS INDEX: 26.81 KG/M2

## 2019-10-31 DIAGNOSIS — G35 MULTIPLE SCLEROSIS (HCC): Primary | ICD-10-CM

## 2019-10-31 DIAGNOSIS — R53.82 CHRONIC FATIGUE: ICD-10-CM

## 2019-10-31 DIAGNOSIS — M79.2 NEUROPATHIC PAIN: ICD-10-CM

## 2019-10-31 DIAGNOSIS — F33.9 EPISODE OF RECURRENT MAJOR DEPRESSIVE DISORDER, UNSPECIFIED DEPRESSION EPISODE SEVERITY (HCC): ICD-10-CM

## 2019-10-31 PROCEDURE — 99214 OFFICE O/P EST MOD 30 MIN: CPT | Performed by: PSYCHIATRY & NEUROLOGY

## 2019-10-31 RX ORDER — BUPROPION HYDROCHLORIDE 150 MG/1
150 TABLET ORAL EVERY MORNING
Qty: 30 TABLET | Refills: 2 | Status: SHIPPED | OUTPATIENT
Start: 2019-10-31 | End: 2020-02-07 | Stop reason: SDUPTHER

## 2019-10-31 NOTE — PROGRESS NOTES
Subjective   Patient ID: Mckenna Castellano is a 54 y.o. female     Chief Complaint   Patient presents with   • Multiple Sclerosis        History of Present Illness    54 y.o. female returns in follow up for RRMS, neuropathic pain and fatigue.  Last visit on 4/17/19 continued Gilenya, Lyrica,  Provigil, ordered labs.        4/17/19 Iron, B12 - NCS    RRMS    MRI Brain 4/2/19 as compared to 5/1/18 few scattered T2 lesions, PVWM and medulla without new enlarging or enhancing lesions    Tolerating Gilenya without side effects.     Recent face lift and has bruising around face 4 weeks ago.      Neuropathic pain     Lyrica 200 mg qhs.  Feet/hands develop B/N/T when under stress.      Fatigue     Provigil prn for fatigue.     Fatigue is moderate.  Heat intolerance is moderate.      Dysphagia    Trouble swallowing liquids and coughing and choking.      Problem history:    Developed bilateral LE numbness.  Then progressed to left leg up to Face and arm.  Left sided increased sensitivity to touch.  Sx lasted for 3 months.  GBP used to improve pain.  No other relapses since first event.      Started on Aubagio in 10/2017.  Noted mild hair loss after starting.  Denies new or worsening sx.  Heat intolerance is moderate.  Fatigue is mild to moderate.  Balance is off and left leg is weaker.  ST memory decreasing.  Bladder frequency and urgency.      Feet have N/T at bedtime.   mg qhs wears off and does not last all night.        Reviewed previous medical records:     Dx with MS in 2005 by Dr Benitez Fraser.  Onset had numbness in both legs, then LE F/A/L N and allodynia.  Treated with interferon for 2 months, N/V.  Switched to Copaxone for 4 -5 years.  Then to Gilenya.  Tolerating Gilenya without noted dz activity.  Moved to [facility] due to low counts.  During switch to Aubagio noted new left medullary lesion with left leg weakness.  Stopped Aubagio due to insurance issues.      MRI Brain - 10/2/17 new area of enhancement in  "right medulla, moderate T2 lesions    Past Medical History:   Diagnosis Date   • Anxiety    • Depression    • Hyperlipidemia    • Migraine    • MS (multiple sclerosis) (CMS/[facility])      Family History   Adopted: Yes   Problem Relation Age of Onset   • Breast cancer Neg Hx    • Ovarian cancer Neg Hx      Social History     Socioeconomic History   • Marital status:      Spouse name: Not on file   • Number of children: Not on file   • Years of education: Not on file   • Highest education level: Not on file   Tobacco Use   • Smoking status: Never Smoker   Substance and Sexual Activity   • Alcohol use: No   • Drug use: No   • Sexual activity: Defer       Review of Systems   Constitutional: Negative for activity change and unexpected weight change.   HENT: Negative for tinnitus and trouble swallowing.    Eyes: Positive for visual disturbance. Negative for photophobia.   Respiratory: Negative for apnea and choking.    Cardiovascular: Negative for leg swelling.   Endocrine: Positive for heat intolerance. Negative for cold intolerance.   Genitourinary: Positive for frequency and urgency. Negative for difficulty urinating and menstrual problem.   Musculoskeletal: Negative for back pain and gait problem.   Skin: Negative for color change.   Allergic/Immunologic: Negative for immunocompromised state.   Neurological: Negative for dizziness, tremors, seizures, syncope, facial asymmetry, speech difficulty and light-headedness.   Hematological: Negative for adenopathy. Does not bruise/bleed easily.   Psychiatric/Behavioral: Positive for decreased concentration. Negative for behavioral problems, confusion, hallucinations and sleep disturbance.       Objective     Vitals:    10/31/19 1339   BP: 106/70   Pulse: 57   SpO2: 100%   Weight: 64.4 kg (142 lb)   Height: 154.9 cm (61\")       Neurologic Exam     Mental Status   Registration: recalls 3 of 3 objects. Recall at 5 minutes: recalls 3 of 3 objects. Follows 3 step commands. "   Attention: normal. Concentration: normal.   Level of consciousness: alert  Knowledge: good and consistent with education.   Able to name object. Able to read. Able to repeat. Able to write. Normal comprehension.     Cranial Nerves     CN II   Visual fields full to confrontation.   Visual acuity: normal  Right visual field deficit: none  Left visual field deficit: none     CN III, IV, VI   Right pupil: Shape: regular. Reactivity: brisk. Consensual response: intact.   Left pupil: Shape: regular. Reactivity: brisk. Consensual response: intact.   Nystagmus: none   Diplopia: none  Ophthalmoparesis: none  Upgaze: normal  Downgaze: normal  Conjugate gaze: present  Vestibulo-ocular reflex: present    CN V   Facial sensation intact.   Right corneal reflex: normal  Left corneal reflex: normal    CN VII   Right facial weakness: none  Left facial weakness: none    CN VIII   Hearing: intact    CN IX, X   Palate: symmetric  Right gag reflex: normal  Left gag reflex: normal    CN XI   Right sternocleidomastoid strength: normal  Left sternocleidomastoid strength: normal    CN XII   Tongue: not atrophic  Fasciculations: absent  Tongue deviation: none    Motor Exam   Muscle bulk: normal  Overall muscle tone: normal  Right arm tone: normal  Left arm tone: normal  Right leg tone: normal  Left leg tone: normal    Sensory Exam   Light touch normal.   Vibration normal.   Proprioception normal.   Pinprick normal.     Gait, Coordination, and Reflexes     Tremor   Resting tremor: absent  Intention tremor: absent  Action tremor: absent    Reflexes   Reflexes 2+ except as noted.       Physical Exam   Constitutional: She appears well-developed and well-nourished.   Nursing note and vitals reviewed.      Lab on 04/17/2019   Component Date Value Ref Range Status   • Folate 04/17/2019 13.40  4.78 - 24.20 ng/mL Final   • Vitamin B-12 04/17/2019 648  211 - 946 pg/mL Final   • Iron 04/17/2019 96  37 - 145 mcg/dL Final   • Iron Saturation 04/17/2019  27  20 - 50 % Final   • Transferrin 04/17/2019 240  200 - 360 mg/dL Final   • TIBC 04/17/2019 358  298 - 536 mcg/dL Final         Assessment/Plan     Problem List Items Addressed This Visit        Nervous and Auditory    Multiple sclerosis (CMS/HCC) - Primary    Current Assessment & Plan     Sx stable on Gilenya            Neuropathic pain    Current Assessment & Plan     Sx stable on Lyrica             Other    Chronic fatigue    Current Assessment & Plan     Provigil prn          Episode of recurrent major depressive disorder (CMS/HCC)    Current Assessment & Plan     Psychological condition is unchanged.  Continue current treatment regimen.  Psychological condition  will be reassessed at the next regular appointment.    Wellbutrin Xl 150 mg qam          Relevant Medications    fingolimod (GILENYA) 0.5 MG capsule    modafinil (PROVIGIL) 200 MG tablet    buPROPion XL (WELLBUTRIN XL) 150 MG 24 hr tablet             No Follow-up on file.

## 2019-10-31 NOTE — ASSESSMENT & PLAN NOTE
Psychological condition is unchanged.  Continue current treatment regimen.  Psychological condition  will be reassessed at the next regular appointment.    Wellbutrin Xl 150 mg qam

## 2020-01-31 DIAGNOSIS — M79.2 NEUROPATHIC PAIN: ICD-10-CM

## 2020-01-31 DIAGNOSIS — M79.2 NEUROPATHIC PAIN: Primary | ICD-10-CM

## 2020-01-31 RX ORDER — PREGABALIN 200 MG/1
200 CAPSULE ORAL 2 TIMES DAILY
Qty: 60 CAPSULE | Refills: 4 | Status: SHIPPED | OUTPATIENT
Start: 2020-01-31 | End: 2020-01-31 | Stop reason: SDUPTHER

## 2020-01-31 RX ORDER — PREGABALIN 200 MG/1
200 CAPSULE ORAL 2 TIMES DAILY
Qty: 60 CAPSULE | Refills: 4 | Status: SHIPPED | OUTPATIENT
Start: 2020-01-31 | End: 2020-05-06 | Stop reason: SDUPTHER

## 2020-01-31 NOTE — TELEPHONE ENCOUNTER
----- Message from Windy Quiñones sent at 1/31/2020  3:53 PM EST -----  Regarding: medication  Contact: [phone]  Medication sent to [facility] pharmacy Lyrica 200 mg please send to [facility] [phone] phone number

## 2020-02-07 RX ORDER — BUPROPION HYDROCHLORIDE 150 MG/1
150 TABLET ORAL EVERY MORNING
Qty: 30 TABLET | Refills: 2 | Status: SHIPPED | OUTPATIENT
Start: 2020-02-07 | End: 2020-02-10 | Stop reason: SDUPTHER

## 2020-02-07 NOTE — TELEPHONE ENCOUNTER
----- Message from Windy Quiñones sent at 2/7/2020  9:46 AM EST -----  Regarding: REFILL  Contact: [phone]  Please refill Wellbutrin  mg     [facility]

## 2020-02-10 RX ORDER — BUPROPION HYDROCHLORIDE 150 MG/1
150 TABLET ORAL EVERY MORNING
Qty: 30 TABLET | Refills: 2 | Status: SHIPPED | OUTPATIENT
Start: 2020-02-10 | End: 2020-05-06 | Stop reason: SDUPTHER

## 2020-03-06 RX ORDER — FINGOLIMOD HCL 0.5 MG/1
CAPSULE ORAL
Qty: 30 CAPSULE | Refills: 11 | Status: SHIPPED | OUTPATIENT
Start: 2020-03-06 | End: 2021-02-10 | Stop reason: SDUPTHER

## 2020-03-16 ENCOUNTER — TELEPHONE (OUTPATIENT)
Dept: NEUROLOGY | Facility: CLINIC | Age: 55
End: 2020-03-16

## 2020-03-16 NOTE — TELEPHONE ENCOUNTER
PT CALLED IN REGARDING HER GILENYA MEDICATION. PT SAID SHE WOULD LIKE TO KNOW IF DR. RIVERO RECOMMENDS ANY PRECAUTIONS THAT PTS SHOULD TAKE WHO ARE ON THIS MEDICATION WITH THE CORONA VIRUS THAT'S GOING AROUND.    BEST CALL BACK - [phone]

## 2020-04-20 ENCOUNTER — TRANSCRIBE ORDERS (OUTPATIENT)
Dept: ADMINISTRATIVE | Facility: HOSPITAL | Age: 55
End: 2020-04-20

## 2020-04-20 DIAGNOSIS — Z12.31 VISIT FOR SCREENING MAMMOGRAM: Primary | ICD-10-CM

## 2020-05-06 ENCOUNTER — TELEMEDICINE (OUTPATIENT)
Dept: NEUROLOGY | Facility: CLINIC | Age: 55
End: 2020-05-06

## 2020-05-06 DIAGNOSIS — G35 MULTIPLE SCLEROSIS (HCC): Primary | ICD-10-CM

## 2020-05-06 DIAGNOSIS — M79.2 NEUROPATHIC PAIN: ICD-10-CM

## 2020-05-06 DIAGNOSIS — F33.9 EPISODE OF RECURRENT MAJOR DEPRESSIVE DISORDER, UNSPECIFIED DEPRESSION EPISODE SEVERITY (HCC): ICD-10-CM

## 2020-05-06 DIAGNOSIS — R53.82 CHRONIC FATIGUE: ICD-10-CM

## 2020-05-06 PROCEDURE — 99214 OFFICE O/P EST MOD 30 MIN: CPT | Performed by: PSYCHIATRY & NEUROLOGY

## 2020-05-06 RX ORDER — MODAFINIL 200 MG/1
200 TABLET ORAL DAILY PRN
Qty: 30 TABLET | Refills: 5 | Status: SHIPPED | OUTPATIENT
Start: 2020-05-06 | End: 2020-10-20 | Stop reason: SDUPTHER

## 2020-05-06 RX ORDER — BUPROPION HYDROCHLORIDE 150 MG/1
150 TABLET ORAL EVERY MORNING
Qty: 30 TABLET | Refills: 11 | Status: SHIPPED | OUTPATIENT
Start: 2020-05-06 | End: 2021-05-20 | Stop reason: SDUPTHER

## 2020-05-06 RX ORDER — TRIPROLIDINE/PSEUDOEPHEDRINE 2.5MG-60MG
TABLET ORAL
COMMUNITY
Start: 2020-03-08 | End: 2021-11-18

## 2020-05-06 RX ORDER — PREGABALIN 200 MG/1
200 CAPSULE ORAL 2 TIMES DAILY
Qty: 60 CAPSULE | Refills: 4 | Status: SHIPPED | OUTPATIENT
Start: 2020-05-06 | End: 2020-11-09 | Stop reason: SDUPTHER

## 2020-05-06 RX ORDER — ONDANSETRON 4 MG/1
TABLET, FILM COATED ORAL
COMMUNITY
End: 2021-02-09 | Stop reason: SDUPTHER

## 2020-05-06 RX ORDER — TOBRAMYCIN 3 MG/ML
SOLUTION/ DROPS OPHTHALMIC
COMMUNITY
Start: 2020-04-20 | End: 2021-06-17

## 2020-05-06 RX ORDER — MOXIFLOXACIN 5 MG/ML
SOLUTION/ DROPS OPHTHALMIC
COMMUNITY
Start: 2020-04-20 | End: 2021-06-17

## 2020-05-06 NOTE — PROGRESS NOTES
Subjective   Patient ID: Mckenna Castellano is a 55 y.o. female     Chief Complaint   Patient presents with   • Multiple Sclerosis     6 month follow up         History of Present Illness    55 y.o. female consents to a video visit in follow up for RRMS, neuropathic pain and fatigue.  Last visit on 10/31/19 continued Gilenya, Lyrica,  Provigil, ordered labs.        7/25//19 WBC 2.0, TSH 1.95, CMP     RRMS    MRI Brain 4/2/19 as compared to 5/1/18 few scattered T2 lesions, PVWM and medulla without new enlarging or enhancing lesions    Tolerating Gilenya without side effects.     No new or worsening sx.      Neuropathic pain     Lyrica 200 mg qhs.  Feet/hands develop B/N/T varies.        Fatigue     Provigil prn for fatigue.     Fatigue is moderate.  Heat intolerance is moderate.      MDD     Sx stable on Wellbutrin    .      Problem history:    Developed bilateral LE numbness.  Then progressed to left leg up to Face and arm.  Left sided increased sensitivity to touch.  Sx lasted for 3 months.  GBP used to improve pain.  No other relapses since first event.      Started on Aubagio in 10/2017.  Noted mild hair loss after starting.  Denies new or worsening sx.  Heat intolerance is moderate.  Fatigue is mild to moderate.  Balance is off and left leg is weaker.  ST memory decreasing.  Bladder frequency and urgency.      Feet have N/T at bedtime.   mg qhs wears off and does not last all night.        Reviewed previous medical records:     Dx with MS in 2005 by Dr Benitez Fraser.  Onset had numbness in both legs, then LE F/A/L N and allodynia.  Treated with interferon for 2 months, N/V.  Switched to Copaxone for 4 -5 years.  Then to Gilenya.  Tolerating Gilenya without noted dz activity.  Moved to [facility] due to low counts.  During switch to Aubagio noted new left medullary lesion with left leg weakness.  Stopped Aubagio due to insurance issues.      MRI Brain - 10/2/17 new area of enhancement in right medulla,  moderate T2 lesions    Past Medical History:   Diagnosis Date   • Anxiety    • Depression    • Hyperlipidemia    • Migraine    • MS (multiple sclerosis) (CMS/HCC)      Family History   Adopted: Yes   Problem Relation Age of Onset   • Breast cancer Neg Hx    • Ovarian cancer Neg Hx      Social History     Socioeconomic History   • Marital status:      Spouse name: Not on file   • Number of children: Not on file   • Years of education: Not on file   • Highest education level: Not on file   Tobacco Use   • Smoking status: Never Smoker   Substance and Sexual Activity   • Alcohol use: No   • Drug use: No   • Sexual activity: Defer       Review of Systems   Constitutional: Negative for activity change and unexpected weight change.   HENT: Negative for tinnitus and trouble swallowing.    Eyes: Positive for visual disturbance. Negative for photophobia.   Respiratory: Negative for apnea and choking.    Cardiovascular: Negative for leg swelling.   Endocrine: Positive for heat intolerance. Negative for cold intolerance.   Genitourinary: Positive for frequency and urgency. Negative for difficulty urinating and menstrual problem.   Musculoskeletal: Negative for back pain and gait problem.   Skin: Negative for color change.   Allergic/Immunologic: Negative for immunocompromised state.   Neurological: Negative for dizziness, tremors, seizures, syncope, facial asymmetry, speech difficulty and light-headedness.   Hematological: Negative for adenopathy. Does not bruise/bleed easily.   Psychiatric/Behavioral: Positive for decreased concentration. Negative for behavioral problems, confusion, hallucinations and sleep disturbance.       Objective     There were no vitals filed for this visit.    Neurologic Exam     Mental Status   Attention: normal. Concentration: normal.   Speech: speech is normal   Level of consciousness: alert  Knowledge: good.   Normal comprehension.     Cranial Nerves     CN III, IV, VI   Extraocular motions  are normal.     CN VII   Facial expression full, symmetric.     CN VIII   CN VIII normal.     Motor Exam MAEW       Physical Exam   Eyes: EOM are normal.   Psychiatric: Her speech is normal.       Lab on 04/17/2019   Component Date Value Ref Range Status   • Folate 04/17/2019 13.40  4.78 - 24.20 ng/mL Final   • Vitamin B-12 04/17/2019 648  211 - 946 pg/mL Final   • Iron 04/17/2019 96  37 - 145 mcg/dL Final   • Iron Saturation 04/17/2019 27  20 - 50 % Final   • Transferrin 04/17/2019 240  200 - 360 mg/dL Final   • TIBC 04/17/2019 358  298 - 536 mcg/dL Final         Assessment/Plan     Problem List Items Addressed This Visit        Nervous and Auditory    Multiple sclerosis (CMS/HCC) - Primary    Current Assessment & Plan     Sx stable on Gilenya    CBC,CMP done at PCP with obtain     MRI Brain          Relevant Orders    MRI Brain With & Without Contrast    CBC & Differential    Comprehensive Metabolic Panel    Neuropathic pain    Current Assessment & Plan     Sx stable on Lyrica          Relevant Medications    pregabalin (LYRICA) 200 MG capsule       Other    Chronic fatigue    Current Assessment & Plan     Provigil prn          Episode of recurrent major depressive disorder (CMS/HCC)    Current Assessment & Plan     Psychological condition is unchanged.  Continue current treatment regimen.  Psychological condition  will be reassessed at the next regular appointment.         Relevant Medications    modafinil (PROVIGIL) 200 MG tablet    buPROPion XL (WELLBUTRIN XL) 150 MG 24 hr tablet    GILENYA 0.5 MG capsule        You have chosen to receive care through a telehealth visit.  Do you consent to use a video/audio connection for your medical care today? Yes    Time:  15 minutes

## 2020-07-17 ENCOUNTER — TELEPHONE (OUTPATIENT)
Dept: NEUROLOGY | Facility: CLINIC | Age: 55
End: 2020-07-17

## 2020-07-17 NOTE — TELEPHONE ENCOUNTER
PATIENT : [phone]  PATIENTS  SUNDEEP NELSON HAS BEEN SUMMONED FOR [employer] HOW EVER SHE DIDN'T KNOW IF SHE COULD GET A LETTER ASKING TO EXCUSE DUE TO HER MS AS HE IS HER ONLY SOURCE OF HELP AND ASSISTANCE. JUROR # [id number]  AUGUST 31ST -SEPT. 25TH.     THIS NEEDS TO MENTION A PERMANENT MEDICAL EXCUSE. THANKYOU    IF WE CAN EMAIL HER THIS INFORMATION : [email or web address]    IF UNABLE TO EMAIL PLEASE CALL [place] , SHE WANTS TO TRY AND GET THIS BEFORE OR ON Monday.

## 2020-07-18 DIAGNOSIS — M79.2 NEUROPATHIC PAIN: ICD-10-CM

## 2020-07-20 ENCOUNTER — HOSPITAL ENCOUNTER (OUTPATIENT)
Dept: MAMMOGRAPHY | Facility: HOSPITAL | Age: 55
Discharge: HOME | End: 2020-07-20
Admitting: INTERNAL MEDICINE

## 2020-07-20 DIAGNOSIS — Z12.31 VISIT FOR SCREENING MAMMOGRAM: ICD-10-CM

## 2020-07-20 PROCEDURE — 77067 SCR MAMMO BI INCL CAD: CPT

## 2020-07-20 PROCEDURE — 77063 BREAST TOMOSYNTHESIS BI: CPT | Performed by: RADIOLOGY

## 2020-07-20 PROCEDURE — 77067 SCR MAMMO BI INCL CAD: CPT | Performed by: RADIOLOGY

## 2020-07-20 PROCEDURE — 77063 BREAST TOMOSYNTHESIS BI: CPT

## 2020-07-20 RX ORDER — PREGABALIN 200 MG/1
CAPSULE ORAL
Qty: 60 CAPSULE | Refills: 4 | OUTPATIENT
Start: 2020-07-20

## 2020-09-02 ENCOUNTER — SPECIALTY PHARMACY (OUTPATIENT)
Dept: ONCOLOGY | Facility: HOSPITAL | Age: 55
End: 2020-09-02

## 2020-09-02 DIAGNOSIS — G35 MULTIPLE SCLEROSIS (HCC): Primary | ICD-10-CM

## 2020-09-14 ENCOUNTER — LAB (OUTPATIENT)
Dept: LAB | Facility: HOSPITAL | Age: 55
End: 2020-09-14

## 2020-09-14 DIAGNOSIS — G35 MULTIPLE SCLEROSIS (HCC): ICD-10-CM

## 2020-09-14 LAB
ALBUMIN SERPL-MCNC: 4.8 G/DL (ref 3.5–5.2)
ALBUMIN/GLOB SERPL: 2.4 G/DL
ALP SERPL-CCNC: 90 U/L (ref 39–117)
ALT SERPL W P-5'-P-CCNC: 34 U/L (ref 1–33)
ANION GAP SERPL CALCULATED.3IONS-SCNC: 10 MMOL/L (ref 5–15)
AST SERPL-CCNC: 32 U/L (ref 1–32)
BASOPHILS # BLD AUTO: 0.03 10*3/MM3 (ref 0–0.2)
BASOPHILS NFR BLD AUTO: 1.4 % (ref 0–1.5)
BILIRUB SERPL-MCNC: 0.3 MG/DL (ref 0–1.2)
BUN SERPL-MCNC: 17 MG/DL (ref 6–20)
BUN/CREAT SERPL: 18.3 (ref 7–25)
CALCIUM SPEC-SCNC: 9.4 MG/DL (ref 8.6–10.5)
CHLORIDE SERPL-SCNC: 103 MMOL/L (ref 98–107)
CO2 SERPL-SCNC: 28 MMOL/L (ref 22–29)
CREAT SERPL-MCNC: 0.93 MG/DL (ref 0.57–1)
DEPRECATED RDW RBC AUTO: 50.4 FL (ref 37–54)
EOSINOPHIL # BLD AUTO: 0.05 10*3/MM3 (ref 0–0.4)
EOSINOPHIL NFR BLD AUTO: 2.3 % (ref 0.3–6.2)
ERYTHROCYTE [DISTWIDTH] IN BLOOD BY AUTOMATED COUNT: 13.1 % (ref 12.3–15.4)
GFR SERPL CREATININE-BSD FRML MDRD: 62 ML/MIN/1.73
GLOBULIN UR ELPH-MCNC: 2 GM/DL
GLUCOSE SERPL-MCNC: 88 MG/DL (ref 65–99)
HCT VFR BLD AUTO: 38.8 % (ref 34–46.6)
HGB BLD-MCNC: 12.4 G/DL (ref 12–15.9)
IMM GRANULOCYTES # BLD AUTO: 0.01 10*3/MM3 (ref 0–0.05)
IMM GRANULOCYTES NFR BLD AUTO: 0.5 % (ref 0–0.5)
LYMPHOCYTES # BLD AUTO: 0.41 10*3/MM3 (ref 0.7–3.1)
LYMPHOCYTES NFR BLD AUTO: 18.7 % (ref 19.6–45.3)
MCH RBC QN AUTO: 33.2 PG (ref 26.6–33)
MCHC RBC AUTO-ENTMCNC: 32 G/DL (ref 31.5–35.7)
MCV RBC AUTO: 103.7 FL (ref 79–97)
MONOCYTES # BLD AUTO: 0.32 10*3/MM3 (ref 0.1–0.9)
MONOCYTES NFR BLD AUTO: 14.6 % (ref 5–12)
NEUTROPHILS NFR BLD AUTO: 1.37 10*3/MM3 (ref 1.7–7)
NEUTROPHILS NFR BLD AUTO: 62.5 % (ref 42.7–76)
NRBC BLD AUTO-RTO: 0 /100 WBC (ref 0–0.2)
PLATELET # BLD AUTO: 168 10*3/MM3 (ref 140–450)
PMV BLD AUTO: 12.6 FL (ref 6–12)
POTASSIUM SERPL-SCNC: 4.3 MMOL/L (ref 3.5–5.2)
PROT SERPL-MCNC: 6.8 G/DL (ref 6–8.5)
RBC # BLD AUTO: 3.74 10*6/MM3 (ref 3.77–5.28)
SODIUM SERPL-SCNC: 141 MMOL/L (ref 136–145)
WBC # BLD AUTO: 2.19 10*3/MM3 (ref 3.4–10.8)

## 2020-09-14 PROCEDURE — 80053 COMPREHEN METABOLIC PANEL: CPT

## 2020-09-14 PROCEDURE — 85025 COMPLETE CBC W/AUTO DIFF WBC: CPT

## 2020-09-14 PROCEDURE — 36415 COLL VENOUS BLD VENIPUNCTURE: CPT

## 2020-10-20 DIAGNOSIS — M79.2 NEUROPATHIC PAIN: ICD-10-CM

## 2020-10-20 DIAGNOSIS — R53.82 CHRONIC FATIGUE: ICD-10-CM

## 2020-10-20 DIAGNOSIS — G35 MULTIPLE SCLEROSIS (HCC): ICD-10-CM

## 2020-10-20 RX ORDER — MODAFINIL 200 MG/1
200 TABLET ORAL DAILY PRN
Qty: 90 TABLET | Refills: 1 | Status: SHIPPED | OUTPATIENT
Start: 2020-10-20 | End: 2020-10-21 | Stop reason: SDUPTHER

## 2020-10-20 RX ORDER — MODAFINIL 200 MG/1
200 TABLET ORAL DAILY PRN
Qty: 30 TABLET | Refills: 5 | Status: SHIPPED | OUTPATIENT
Start: 2020-10-20 | End: 2020-10-20 | Stop reason: SDUPTHER

## 2020-10-21 DIAGNOSIS — G35 MULTIPLE SCLEROSIS (HCC): ICD-10-CM

## 2020-10-21 DIAGNOSIS — R53.82 CHRONIC FATIGUE: ICD-10-CM

## 2020-10-21 DIAGNOSIS — M79.2 NEUROPATHIC PAIN: ICD-10-CM

## 2020-10-21 RX ORDER — MODAFINIL 200 MG/1
200 TABLET ORAL DAILY PRN
Qty: 90 TABLET | Refills: 1 | Status: SHIPPED | OUTPATIENT
Start: 2020-10-21 | End: 2020-11-20 | Stop reason: SDUPTHER

## 2020-10-21 NOTE — TELEPHONE ENCOUNTER
Patient called and stated that she needed this prescription sent to [facility] and not [facility].     Can you please reprocess this request and send it to [facility] so she can pick it up. Patient is going out of town in the morning?

## 2020-11-09 ENCOUNTER — TELEPHONE (OUTPATIENT)
Dept: NEUROLOGY | Facility: CLINIC | Age: 55
End: 2020-11-09

## 2020-11-09 DIAGNOSIS — M79.2 NEUROPATHIC PAIN: ICD-10-CM

## 2020-11-09 RX ORDER — PREGABALIN 200 MG/1
200 CAPSULE ORAL 2 TIMES DAILY
Qty: 60 CAPSULE | Refills: 4 | Status: SHIPPED | OUTPATIENT
Start: 2020-11-09 | End: 2020-11-10 | Stop reason: SDUPTHER

## 2020-11-09 NOTE — TELEPHONE ENCOUNTER
Pt's lyrica was sent to wrong pharmacy she is leaving in morning at 6 am to [facility],  [facility] at # [phone] address for them is [address] [facility]  Please send over asap I tried tp justin clinical a nd non clinical lines and it rang, thanks so much

## 2020-11-10 RX ORDER — PREGABALIN 200 MG/1
200 CAPSULE ORAL 2 TIMES DAILY
Qty: 60 CAPSULE | Refills: 0 | Status: SHIPPED | OUTPATIENT
Start: 2020-11-10 | End: 2020-11-20 | Stop reason: SDUPTHER

## 2020-11-10 NOTE — TELEPHONE ENCOUNTER
Made patient aware that this message was sent after the office closed. Please resend Lyrica with no refills. Per patient, using this pharmacy is a one time thing. Thanks

## 2020-11-16 NOTE — TELEPHONE ENCOUNTER
SUSU CALLED BACK AGAIN FOR PT'S REFILL. NOTIFIED PT'S MEDICATION WAS SENT TO PHARMACY OF PT'S CHOICE SINCE SHE WAS LEAVING [facility].

## 2020-11-20 ENCOUNTER — OFFICE VISIT (OUTPATIENT)
Dept: NEUROLOGY | Facility: CLINIC | Age: 55
End: 2020-11-20

## 2020-11-20 VITALS
WEIGHT: 151 LBS | SYSTOLIC BLOOD PRESSURE: 132 MMHG | OXYGEN SATURATION: 98 % | HEART RATE: 74 BPM | HEIGHT: 64 IN | BODY MASS INDEX: 25.78 KG/M2 | DIASTOLIC BLOOD PRESSURE: 78 MMHG | TEMPERATURE: 97.1 F

## 2020-11-20 DIAGNOSIS — M79.2 NEUROPATHIC PAIN: ICD-10-CM

## 2020-11-20 DIAGNOSIS — F33.9 EPISODE OF RECURRENT MAJOR DEPRESSIVE DISORDER, UNSPECIFIED DEPRESSION EPISODE SEVERITY (HCC): ICD-10-CM

## 2020-11-20 DIAGNOSIS — R53.82 CHRONIC FATIGUE: ICD-10-CM

## 2020-11-20 DIAGNOSIS — G35 MULTIPLE SCLEROSIS (HCC): Primary | ICD-10-CM

## 2020-11-20 PROCEDURE — 99214 OFFICE O/P EST MOD 30 MIN: CPT | Performed by: PSYCHIATRY & NEUROLOGY

## 2020-11-20 RX ORDER — PREGABALIN 200 MG/1
200 CAPSULE ORAL 2 TIMES DAILY
Qty: 60 CAPSULE | Refills: 5 | Status: SHIPPED | OUTPATIENT
Start: 2020-11-20 | End: 2021-05-20 | Stop reason: SDUPTHER

## 2020-11-20 RX ORDER — MODAFINIL 200 MG/1
200 TABLET ORAL DAILY PRN
Qty: 90 TABLET | Refills: 1 | Status: SHIPPED | OUTPATIENT
Start: 2020-11-20 | End: 2021-05-20 | Stop reason: SDUPTHER

## 2020-11-20 NOTE — PROGRESS NOTES
Subjective   Patient ID: Mckenna Castellano is a 55 y.o. female     Chief Complaint   Patient presents with   • Multiple Sclerosis     Post MRI        History of Present Illness    55 y.o. female returns in follow up for RRMS, neuropathic pain and fatigue.  Last visit on 5/6/20 continued Gilenya, Lyrica,  Provigil.       9/14/20 CBC, CMP NCS    RRMS    MRI Brain, my review of films, 11/3/20 as compared to 4/2/19 few scattered T2 lesions, PVWM and medulla without new/enlarging/enhancing lesions    Tolerating Gilenya without side effects.     Word finding problems most prominent sx.      Neuropathic pain     Lyrica 200 mg qhs.  Feet/hands develop B/N/T when under stress.    Fatigue     Provigil prn for fatigue helps with fatigue    Fatigue is moderate.  Heat intolerance is moderate.      Dysphagia    Sx resolved.       Problem history:    Developed bilateral LE numbness.  Then progressed to left leg up to Face and arm.  Left sided increased sensitivity to touch.  Sx lasted for 3 months.  GBP used to improve pain.  No other relapses since first event.      Started on Aubagio in 10/2017.  Noted mild hair loss after starting.  Denies new or worsening sx.  Heat intolerance is moderate.  Fatigue is mild to moderate.  Balance is off and left leg is weaker.  ST memory decreasing.  Bladder frequency and urgency.      Feet have N/T at bedtime.   mg qhs wears off and does not last all night.        Reviewed previous medical records:     Dx with MS in 2005 by Dr Benitez Fraser.  Onset had numbness in both legs, then LE F/A/L N and allodynia.  Treated with interferon for 2 months, N/V.  Switched to Copaxone for 4 -5 years.  Then to Gilenya.  Tolerating Gilenya without noted dz activity.  Moved to Aubagio due to low counts.  During switch to Aubagio noted new left medullary lesion with left leg weakness.  Stopped Aubagio due to insurance issues.      MRI Brain - 10/2/17 new area of enhancement in right medulla, moderate T2  "lesions    Past Medical History:   Diagnosis Date   • Anxiety    • Depression    • Hyperlipidemia    • Migraine    • MS (multiple sclerosis) (CMS/HCC)      Family History   Adopted: Yes   Problem Relation Age of Onset   • Breast cancer Neg Hx    • Ovarian cancer Neg Hx      Social History     Socioeconomic History   • Marital status:      Spouse name: Not on file   • Number of children: Not on file   • Years of education: Not on file   • Highest education level: Not on file   Tobacco Use   • Smoking status: Never Smoker   • Smokeless tobacco: Never Used   Substance and Sexual Activity   • Alcohol use: No   • Drug use: No   • Sexual activity: Defer       Review of Systems   Constitutional: Negative for activity change and unexpected weight change.   HENT: Negative for tinnitus and trouble swallowing.    Eyes: Positive for visual disturbance. Negative for photophobia.   Respiratory: Negative for apnea and choking.    Cardiovascular: Negative for leg swelling.   Endocrine: Positive for heat intolerance. Negative for cold intolerance.   Genitourinary: Positive for frequency and urgency. Negative for difficulty urinating and menstrual problem.   Musculoskeletal: Negative for back pain and gait problem.   Skin: Negative for color change.   Allergic/Immunologic: Negative for immunocompromised state.   Neurological: Negative for dizziness, tremors, seizures, syncope, facial asymmetry, speech difficulty and light-headedness.   Hematological: Negative for adenopathy. Does not bruise/bleed easily.   Psychiatric/Behavioral: Positive for decreased concentration. Negative for behavioral problems, confusion, hallucinations and sleep disturbance.       Objective     Vitals:    11/20/20 1539   BP: 132/78   Pulse: 74   Temp: 97.1 °F (36.2 °C)   SpO2: 98%   Weight: 68.5 kg (151 lb)   Height: 162.6 cm (64\")       Neurologic Exam     Mental Status   Registration: recalls 3 of 3 objects. Recall at 5 minutes: recalls 3 of 3 " objects. Follows 3 step commands.   Attention: normal. Concentration: normal.   Level of consciousness: alert  Knowledge: good and consistent with education.   Able to name object. Able to read. Able to repeat. Able to write. Normal comprehension.     Cranial Nerves     CN II   Visual fields full to confrontation.   Visual acuity: normal  Right visual field deficit: none  Left visual field deficit: none     CN III, IV, VI   Right pupil: Shape: regular. Reactivity: brisk. Consensual response: intact.   Left pupil: Shape: regular. Reactivity: brisk. Consensual response: intact.   Nystagmus: none   Diplopia: none  Ophthalmoparesis: none  Upgaze: normal  Downgaze: normal  Conjugate gaze: present  Vestibulo-ocular reflex: present    CN V   Facial sensation intact.   Right corneal reflex: normal  Left corneal reflex: normal    CN VII   Right facial weakness: none  Left facial weakness: none    CN VIII   Hearing: intact    CN IX, X   Palate: symmetric  Right gag reflex: normal  Left gag reflex: normal    CN XI   Right sternocleidomastoid strength: normal  Left sternocleidomastoid strength: normal    CN XII   Tongue: not atrophic  Fasciculations: absent  Tongue deviation: none    Motor Exam   Muscle bulk: normal  Overall muscle tone: normal  Right arm tone: normal  Left arm tone: normal  Right leg tone: normal  Left leg tone: normal    Sensory Exam   Light touch normal.   Vibration normal.   Proprioception normal.   Pinprick normal.     Gait, Coordination, and Reflexes     Tremor   Resting tremor: absent  Intention tremor: absent  Action tremor: absent    Reflexes   Reflexes 2+ except as noted.       Physical Exam   Constitutional: She appears well-developed.   Nursing note and vitals reviewed.      Lab on 09/14/2020   Component Date Value Ref Range Status   • Glucose 09/14/2020 88  65 - 99 mg/dL Final   • BUN 09/14/2020 17  6 - 20 mg/dL Final   • Creatinine 09/14/2020 0.93  0.57 - 1.00 mg/dL Final   • Sodium 09/14/2020 141   136 - 145 mmol/L Final   • Potassium 09/14/2020 4.3  3.5 - 5.2 mmol/L Final    Slight hemolysis detected by analyzer. Results may be affected.   • Chloride 09/14/2020 103  98 - 107 mmol/L Final   • CO2 09/14/2020 28.0  22.0 - 29.0 mmol/L Final   • Calcium 09/14/2020 9.4  8.6 - 10.5 mg/dL Final   • Total Protein 09/14/2020 6.8  6.0 - 8.5 g/dL Final   • Albumin 09/14/2020 4.80  3.50 - 5.20 g/dL Final   • ALT (SGPT) 09/14/2020 34* 1 - 33 U/L Final   • AST (SGOT) 09/14/2020 32  1 - 32 U/L Final   • Alkaline Phosphatase 09/14/2020 90  39 - 117 U/L Final   • Total Bilirubin 09/14/2020 0.3  0.0 - 1.2 mg/dL Final   • eGFR Non African Amer 09/14/2020 62  >60 mL/min/1.73 Final   • Globulin 09/14/2020 2.0  gm/dL Final   • A/G Ratio 09/14/2020 2.4  g/dL Final   • BUN/Creatinine Ratio 09/14/2020 18.3  7.0 - 25.0 Final   • Anion Gap 09/14/2020 10.0  5.0 - 15.0 mmol/L Final   • WBC 09/14/2020 2.19* 3.40 - 10.80 10*3/mm3 Final   • RBC 09/14/2020 3.74* 3.77 - 5.28 10*6/mm3 Final   • Hemoglobin 09/14/2020 12.4  12.0 - 15.9 g/dL Final   • Hematocrit 09/14/2020 38.8  34.0 - 46.6 % Final   • MCV 09/14/2020 103.7* 79.0 - 97.0 fL Final   • MCH 09/14/2020 33.2* 26.6 - 33.0 pg Final   • MCHC 09/14/2020 32.0  31.5 - 35.7 g/dL Final   • RDW 09/14/2020 13.1  12.3 - 15.4 % Final   • RDW-SD 09/14/2020 50.4  37.0 - 54.0 fl Final   • MPV 09/14/2020 12.6* 6.0 - 12.0 fL Final   • Platelets 09/14/2020 168  140 - 450 10*3/mm3 Final   • Neutrophil % 09/14/2020 62.5  42.7 - 76.0 % Final   • Lymphocyte % 09/14/2020 18.7* 19.6 - 45.3 % Final   • Monocyte % 09/14/2020 14.6* 5.0 - 12.0 % Final   • Eosinophil % 09/14/2020 2.3  0.3 - 6.2 % Final   • Basophil % 09/14/2020 1.4  0.0 - 1.5 % Final   • Immature Grans % 09/14/2020 0.5  0.0 - 0.5 % Final   • Neutrophils, Absolute 09/14/2020 1.37* 1.70 - 7.00 10*3/mm3 Final   • Lymphocytes, Absolute 09/14/2020 0.41* 0.70 - 3.10 10*3/mm3 Final   • Monocytes, Absolute 09/14/2020 0.32  0.10 - 0.90 10*3/mm3 Final   •  Eosinophils, Absolute 09/14/2020 0.05  0.00 - 0.40 10*3/mm3 Final   • Basophils, Absolute 09/14/2020 0.03  0.00 - 0.20 10*3/mm3 Final   • Immature Grans, Absolute 09/14/2020 0.01  0.00 - 0.05 10*3/mm3 Final   • nRBC 09/14/2020 0.0  0.0 - 0.2 /100 WBC Final         Assessment/Plan     Problem List Items Addressed This Visit        Nervous and Auditory    Multiple sclerosis (CMS/HCC) - Primary    Current Assessment & Plan     No new or worsening sx on Gilenya    MRI Brain mild T2 lesion load          Relevant Medications    modafinil (PROVIGIL) 200 MG tablet    Neuropathic pain    Current Assessment & Plan     Sx stable on Lyrica          Relevant Medications    pregabalin (Lyrica) 200 MG capsule    modafinil (PROVIGIL) 200 MG tablet       Other    Chronic fatigue    Current Assessment & Plan     Fatigue stable on Provigil          Relevant Medications    modafinil (PROVIGIL) 200 MG tablet    Episode of recurrent major depressive disorder (CMS/HCC)    Current Assessment & Plan     Psychological condition is unchanged.  Continue current treatment regimen.  Psychological condition  will be reassessed at the next regular appointment.         Relevant Medications    GILENYA 0.5 MG capsule    buPROPion XL (Wellbutrin XL) 150 MG 24 hr tablet    modafinil (PROVIGIL) 200 MG tablet             No follow-ups on file.

## 2021-02-09 ENCOUNTER — TELEPHONE (OUTPATIENT)
Dept: NEUROLOGY | Facility: CLINIC | Age: 56
End: 2021-02-09

## 2021-02-09 RX ORDER — ONDANSETRON 4 MG/1
4 TABLET, FILM COATED ORAL EVERY 12 HOURS PRN
Qty: 60 TABLET | Refills: 1 | Status: SHIPPED | OUTPATIENT
Start: 2021-02-09 | End: 2022-06-03 | Stop reason: SDUPTHER

## 2021-02-09 NOTE — TELEPHONE ENCOUNTER
Pt called in stating she is going to need your help. She has received messages from [employer] stating they are trying to call her insurance and insurance is rejecting her Gilenya refill. States she is in the Gilenya go program and she was under the impression they are supposed to cover everything. She would like for you to give her a call back.     [phone]

## 2021-02-09 NOTE — TELEPHONE ENCOUNTER
Caller: Mckenna Castellano    Relationship: Patient    Best call back number: [phone]    Medication needed:   Requested Prescriptions     Pending Prescriptions Disp Refills   • ondansetron (Zofran) 4 MG tablet         When do you need the refill by: asap   What details did the patient provide when requesting the medication: completely out  Does the patient have less than a 3 day supply:  [x] Yes  [] No    What is the patient's preferred pharmacy:    Confirmed pharmacy in chart is correct

## 2021-02-10 ENCOUNTER — SPECIALTY PHARMACY (OUTPATIENT)
Dept: ONCOLOGY | Facility: HOSPITAL | Age: 56
End: 2021-02-10

## 2021-02-10 RX ORDER — FINGOLIMOD HYDROCHLORIDE 0.5 MG/1
0.5 CAPSULE ORAL DAILY
Qty: 30 CAPSULE | Refills: 11 | Status: SHIPPED | OUTPATIENT
Start: 2021-02-10 | End: 2022-01-14 | Stop reason: SDUPTHER

## 2021-03-18 ENCOUNTER — LAB (OUTPATIENT)
Dept: LAB | Facility: HOSPITAL | Age: 56
End: 2021-03-18

## 2021-03-18 ENCOUNTER — SPECIALTY PHARMACY (OUTPATIENT)
Dept: ONCOLOGY | Facility: HOSPITAL | Age: 56
End: 2021-03-18

## 2021-03-18 DIAGNOSIS — G35 MULTIPLE SCLEROSIS (HCC): ICD-10-CM

## 2021-03-18 DIAGNOSIS — G35 MULTIPLE SCLEROSIS (HCC): Primary | ICD-10-CM

## 2021-03-18 LAB
ALBUMIN SERPL-MCNC: 4.8 G/DL (ref 3.5–5.2)
ALBUMIN/GLOB SERPL: 2.3 G/DL
ALP SERPL-CCNC: 98 U/L (ref 39–117)
ALT SERPL W P-5'-P-CCNC: 31 U/L (ref 1–33)
ANION GAP SERPL CALCULATED.3IONS-SCNC: 9 MMOL/L (ref 5–15)
AST SERPL-CCNC: 30 U/L (ref 1–32)
BASOPHILS # BLD AUTO: 0.03 10*3/MM3 (ref 0–0.2)
BASOPHILS NFR BLD AUTO: 0.8 % (ref 0–1.5)
BILIRUB SERPL-MCNC: 0.2 MG/DL (ref 0–1.2)
BUN SERPL-MCNC: 22 MG/DL (ref 6–20)
BUN/CREAT SERPL: 29.7 (ref 7–25)
CALCIUM SPEC-SCNC: 9.3 MG/DL (ref 8.6–10.5)
CHLORIDE SERPL-SCNC: 105 MMOL/L (ref 98–107)
CO2 SERPL-SCNC: 29 MMOL/L (ref 22–29)
CREAT SERPL-MCNC: 0.74 MG/DL (ref 0.57–1)
DEPRECATED RDW RBC AUTO: 49.4 FL (ref 37–54)
EOSINOPHIL # BLD AUTO: 0.09 10*3/MM3 (ref 0–0.4)
EOSINOPHIL NFR BLD AUTO: 2.5 % (ref 0.3–6.2)
ERYTHROCYTE [DISTWIDTH] IN BLOOD BY AUTOMATED COUNT: 13.2 % (ref 12.3–15.4)
GFR SERPL CREATININE-BSD FRML MDRD: 81 ML/MIN/1.73
GLOBULIN UR ELPH-MCNC: 2.1 GM/DL
GLUCOSE SERPL-MCNC: 95 MG/DL (ref 65–99)
HCT VFR BLD AUTO: 38.7 % (ref 34–46.6)
HGB BLD-MCNC: 12.3 G/DL (ref 12–15.9)
IMM GRANULOCYTES # BLD AUTO: 0.02 10*3/MM3 (ref 0–0.05)
IMM GRANULOCYTES NFR BLD AUTO: 0.6 % (ref 0–0.5)
LYMPHOCYTES # BLD AUTO: 0.39 10*3/MM3 (ref 0.7–3.1)
LYMPHOCYTES NFR BLD AUTO: 11 % (ref 19.6–45.3)
MCH RBC QN AUTO: 32.3 PG (ref 26.6–33)
MCHC RBC AUTO-ENTMCNC: 31.8 G/DL (ref 31.5–35.7)
MCV RBC AUTO: 101.6 FL (ref 79–97)
MONOCYTES # BLD AUTO: 0.51 10*3/MM3 (ref 0.1–0.9)
MONOCYTES NFR BLD AUTO: 14.4 % (ref 5–12)
NEUTROPHILS NFR BLD AUTO: 2.51 10*3/MM3 (ref 1.7–7)
NEUTROPHILS NFR BLD AUTO: 70.7 % (ref 42.7–76)
NRBC BLD AUTO-RTO: 0 /100 WBC (ref 0–0.2)
PLATELET # BLD AUTO: 186 10*3/MM3 (ref 140–450)
PMV BLD AUTO: 12.6 FL (ref 6–12)
POTASSIUM SERPL-SCNC: 4.5 MMOL/L (ref 3.5–5.2)
PROT SERPL-MCNC: 6.9 G/DL (ref 6–8.5)
RBC # BLD AUTO: 3.81 10*6/MM3 (ref 3.77–5.28)
SODIUM SERPL-SCNC: 143 MMOL/L (ref 136–145)
WBC # BLD AUTO: 3.55 10*3/MM3 (ref 3.4–10.8)

## 2021-03-18 PROCEDURE — 36415 COLL VENOUS BLD VENIPUNCTURE: CPT

## 2021-03-18 PROCEDURE — 80053 COMPREHEN METABOLIC PANEL: CPT

## 2021-03-18 PROCEDURE — 85025 COMPLETE CBC W/AUTO DIFF WBC: CPT

## 2021-05-20 ENCOUNTER — OFFICE VISIT (OUTPATIENT)
Dept: NEUROLOGY | Facility: CLINIC | Age: 56
End: 2021-05-20

## 2021-05-20 VITALS
HEIGHT: 64 IN | OXYGEN SATURATION: 96 % | HEART RATE: 61 BPM | WEIGHT: 154.9 LBS | SYSTOLIC BLOOD PRESSURE: 122 MMHG | DIASTOLIC BLOOD PRESSURE: 74 MMHG | RESPIRATION RATE: 18 BRPM | BODY MASS INDEX: 26.44 KG/M2

## 2021-05-20 DIAGNOSIS — R53.82 CHRONIC FATIGUE: ICD-10-CM

## 2021-05-20 DIAGNOSIS — F33.9 EPISODE OF RECURRENT MAJOR DEPRESSIVE DISORDER, UNSPECIFIED DEPRESSION EPISODE SEVERITY (HCC): ICD-10-CM

## 2021-05-20 DIAGNOSIS — M79.2 NEUROPATHIC PAIN: ICD-10-CM

## 2021-05-20 DIAGNOSIS — G35 MULTIPLE SCLEROSIS (HCC): Primary | ICD-10-CM

## 2021-05-20 PROCEDURE — 99214 OFFICE O/P EST MOD 30 MIN: CPT | Performed by: PSYCHIATRY & NEUROLOGY

## 2021-05-20 RX ORDER — MODAFINIL 200 MG/1
200 TABLET ORAL DAILY PRN
Qty: 90 TABLET | Refills: 1 | Status: SHIPPED | OUTPATIENT
Start: 2021-05-20 | End: 2021-12-13 | Stop reason: SDUPTHER

## 2021-05-20 RX ORDER — PREGABALIN 200 MG/1
200 CAPSULE ORAL 2 TIMES DAILY
Qty: 180 CAPSULE | Refills: 1 | Status: SHIPPED | OUTPATIENT
Start: 2021-05-20 | End: 2021-12-13 | Stop reason: SDUPTHER

## 2021-05-20 RX ORDER — BUPROPION HYDROCHLORIDE 300 MG/1
300 TABLET ORAL EVERY MORNING
Qty: 90 TABLET | Refills: 3 | Status: SHIPPED | OUTPATIENT
Start: 2021-05-20 | End: 2022-06-03 | Stop reason: SDUPTHER

## 2021-05-20 NOTE — PROGRESS NOTES
Chief Complaint  Multiple Sclerosis (6 month)    Subjective          Mckenna Kat Castellano presents to [facility] NEUROLOGY     History of Present Illness    56 y.o. female returns in follow up.  Last visit on 11/20/2020 continued Gilenya, Lyrica,  Provigil.        3/18/21 CBC, CMP NCS     RRMS     MSFC stable to improved.     MRI Brain 11/3/20 as compared to 4/2/19 few scattered T2 lesions, PVWM and medulla without new/enlarging/enhancing lesions     Tolerating Gilenya without side effects.      Neuropathic pain      Lyrica 200 mg qhs.  Feet/hands develop B/N/T when under stress.      Fatigue      Provigil prn for fatigue helps with fatigue     Fatigue is moderate.  Heat intolerance is moderate.       Dysphagia     Increased fullness and abdominal distension     MDD    Mood needs improvement  on Wellbutrin     Problem history:     Developed bilateral LE numbness.  Then progressed to left leg up to Face and arm.  Left sided increased sensitivity to touch.  Sx lasted for 3 months.  GBP used to improve pain.  No other relapses since first event.       Started on Aubagio in 10/2017.  Noted mild hair loss after starting.  Denies new or worsening sx.  Heat intolerance is moderate.  Fatigue is mild to moderate.  Balance is off and left leg is weaker.  ST memory decreasing.  Bladder frequency and urgency.       Feet have N/T at bedtime.   mg qhs wears off and does not last all night.         Reviewed previous medical records:     Dx with MS in 2005 by Dr Benitez Fraser.  Onset had numbness in both legs, then LE F/A/L N and allodynia.  Treated with interferon for 2 months, N/V.  Switched to Copaxone for 4 -5 years.  Then to Gilenya.  Tolerating Gilenya without noted dz activity.  Moved to Aubagio due to low counts.  During switch to Aubagio noted new left medullary lesion with left leg weakness.  Stopped Aubagio due to insurance issues.       MRI Brain - 10/2/17 new area of enhancement in right medulla,  "moderate T2 lesions      Objective   Vital Signs:   /74   Pulse 61   Resp 18   Ht 162.6 cm (64.02\")   Wt 70.3 kg (154 lb 14.4 oz)   SpO2 96%   BMI 26.58 kg/m²     Physical Exam  Eyes:      Extraocular Movements: EOM normal.      Pupils: Pupils are equal, round, and reactive to light.   Neurological:      Mental Status: She is oriented to person, place, and time.      Gait: Gait is intact.      Deep Tendon Reflexes: Strength normal.   Psychiatric:         Speech: Speech normal.          Neurologic Exam     Mental Status   Oriented to person, place, and time.   Speech: speech is normal   Level of consciousness: alert  Knowledge: good and consistent with education.   Normal comprehension.     Cranial Nerves   Cranial nerves II through XII intact.     CN II   Visual fields full to confrontation.   Visual acuity: normal  Right visual field deficit: none  Left visual field deficit: none     CN III, IV, VI   Pupils are equal, round, and reactive to light.  Extraocular motions are normal.   Nystagmus: none   Diplopia: none  Ophthalmoparesis: none  Upgaze: normal  Downgaze: normal  Conjugate gaze: present    CN V   Facial sensation intact.   Right corneal reflex: normal  Left corneal reflex: normal    CN VII   Right facial weakness: none  Left facial weakness: none    CN VIII   Hearing: intact    CN IX, X   Palate: symmetric  Right gag reflex: normal  Left gag reflex: normal    CN XI   Right sternocleidomastoid strength: normal  Left sternocleidomastoid strength: normal    CN XII   Tongue: not atrophic  Fasciculations: absent  Tongue deviation: none    Motor Exam   Muscle bulk: normal  Overall muscle tone: normal    Strength   Strength 5/5 throughout.     Sensory Exam   Light touch normal.     Gait, Coordination, and Reflexes     Gait  Gait: normal    Tremor   Resting tremor: absent  Intention tremor: absent  Action tremor: absent    Reflexes   Reflexes 2+ except as noted.      Result Review :   The following " data was reviewed by: Surinder Stover MD on 05/20/2021:  Common labs    Common Labsle 9/14/20 9/14/20 3/18/21 3/18/21    1246 1246 1630 1630   Glucose  88  95   BUN  17  22 (A)   Creatinine  0.93  0.74   eGFR Non African Am  62  81   Sodium  141  143   Potassium  4.3  4.5   Chloride  103  105   Calcium  9.4  9.3   Albumin  4.80  4.80   Total Bilirubin  0.3  0.2   Alkaline Phosphatase  90  98   AST (SGOT)  32  30   ALT (SGPT)  34 (A)  31   WBC 2.19 (A)  3.55    Hemoglobin 12.4  12.3    Hematocrit 38.8  38.7    Platelets 168  186    (A) Abnormal value       Comments are available for some flowsheets but are not being displayed.                     Assessment and Plan    Diagnoses and all orders for this visit:    1. Multiple sclerosis (CMS/HCC) (Primary)    2. Episode of recurrent major depressive disorder, unspecified depression episode severity (CMS/HCC)    3. Neuropathic pain        Follow Up   No follow-ups on file.  Patient was given instructions and counseling regarding her condition or for health maintenance advice. Please see specific information pulled into the AVS if appropriate.

## 2021-05-20 NOTE — ASSESSMENT & PLAN NOTE
Patient's depression is recurrent and is mild without psychosis. Their depression is currently in partial remission and the condition is worsening. This will be reassessed at the next regular appointment. F/U as described:patient was prescribed an antidepressant medicine.    Increase Wellbutrin  mg qhs

## 2021-06-17 ENCOUNTER — TELEMEDICINE (OUTPATIENT)
Dept: FAMILY MEDICINE CLINIC | Facility: TELEHEALTH | Age: 56
End: 2021-06-17

## 2021-06-17 DIAGNOSIS — R09.82 POST-NASAL DRIP: Primary | ICD-10-CM

## 2021-06-17 PROCEDURE — 99203 OFFICE O/P NEW LOW 30 MIN: CPT | Performed by: NURSE PRACTITIONER

## 2021-06-17 NOTE — PATIENT INSTRUCTIONS
Postnasal Drip  Postnasal drip is the feeling of mucus going down the back of your throat. Mucus is a slimy substance that moistens and cleans your nose and throat, as well as the air pockets in face bones near your forehead and cheeks (sinuses). Small amounts of mucus pass from your nose and sinuses down the back of your throat all the time. This is normal. When you produce too much mucus or the mucus gets too thick, you can feel it.  Some common causes of postnasal drip include:  · Having more mucus because of:  ? A cold or the flu.  ? Allergies.  ? Cold air.  ? Certain medicines.  · Having more mucus that is thicker because of:  ? A sinus or nasal infection.  ? Dry air.  ? A food allergy.  Follow these instructions at home:  Relieving discomfort    · Gargle with a salt-water mixture 3-4 times a day or as needed. To make a salt-water mixture, completely dissolve ½-1 tsp of salt in 1 cup of warm water.  · If the air in your home is dry, use a humidifier to add moisture to the air.  · Use a saline spray or container (neti pot) to flush out the nose (nasal irrigation). These methods can help clear away mucus and keep the nasal passages moist.  General instructions  · Take over-the-counter and prescription medicines only as told by your health care provider.  · Follow instructions from your health care provider about eating or drinking restrictions. You may need to avoid caffeine.  · Avoid things that you know you are allergic to (allergens), like dust, mold, pollen, pets, or certain foods.  · Drink enough fluid to keep your urine pale yellow.  · Keep all follow-up visits as told by your health care provider. This is important.  Contact a health care provider if:  · You have a fever.  · You have a sore throat.  · You have difficulty swallowing.  · You have headache.  · You have sinus pain.  · You have a cough that does not go away.  · The mucus from your nose becomes thick and is green or yellow in color.  · You have  cold or flu symptoms that last more than 10 days.  Summary  · Postnasal drip is the feeling of mucus going down the back of your throat.  · If your health care provider approves, use nasal irrigation or a nasal spray 2?4 times a day.  · Avoid things that you know you are allergic to (allergens), like dust, mold, pollen, pets, or certain foods.  This information is not intended to replace advice given to you by your health care provider. Make sure you discuss any questions you have with your health care provider.  Document Revised: 04/10/2020 Document Reviewed: 04/02/2018  Elsevier Patient Education © 2021 [employer]

## 2021-06-17 NOTE — PROGRESS NOTES
HPI  Mckenna Kat Castellano is a 56 y.o. female  presents with complaint of drainage in throat, frequently clearing throat, irritation in throat, and cough that started last night. Feels like she had difficulty swallowing last night due to so much mucus in throat.     Does not usually have allergies.    Started zicam and allegra last night but could not tell a difference in symptoms.     Denies fever, chill, sweats.    Review of Systems   Constitutional: Negative for chills, diaphoresis and fever.   HENT: Positive for postnasal drip and sore throat. Negative for congestion, rhinorrhea, sinus pressure and sneezing.    Respiratory: Positive for cough.    Cardiovascular: Negative for chest pain.   Neurological: Negative for dizziness (no more than normal).       Past Medical History:   Diagnosis Date   • Anxiety    • Depression    • Hyperlipidemia    • Migraine    • MS (multiple sclerosis) (CMS/[facility])        Family History   Adopted: Yes   Problem Relation Age of Onset   • Breast cancer Neg Hx    • Ovarian cancer Neg Hx        Social History     Socioeconomic History   • Marital status:      Spouse name: Not on file   • Number of children: Not on file   • Years of education: Not on file   • Highest education level: Not on file   Tobacco Use   • Smoking status: Never Smoker   • Smokeless tobacco: Never Used   Substance and Sexual Activity   • Alcohol use: No   • Drug use: No   • Sexual activity: Defer         There were no vitals taken for this visit.    PHYSICAL EXAM  Physical Exam   Constitutional: She appears well-developed and well-nourished.   HENT:   Head: Normocephalic.   Nose: Nose normal.   Neck: Neck normal appearance.  Pulmonary/Chest: Effort normal.   Neurological: She is alert.   Psychiatric: She has a normal mood and affect. Her speech is normal.       Diagnoses and all orders for this visit:    1. Post-nasal drip (Primary)      **Increase fluids. Start flonase daily. Take benadryl at bedtime the next  few nights to help dry up PND. May take allegra in the mornings. Mucinex to thin secretions.     FOLLOW-UP  As discussed during visit with [facility], if symptoms worsen or fail to improve, follow-up with PCP/Urgent Care/Emergency Department.    Patient verbalizes understanding of medications, instructions for treatment and follow-up.    Rosie Michael, APRN  06/17/2021  12:01 EDT    This visit was performed via Telehealth.  This patient has been instructed to follow-up with their primary care provider if their symptoms worsen or the treatment provided does not resolve their illness.

## 2021-11-17 ENCOUNTER — LAB (OUTPATIENT)
Dept: LAB | Facility: HOSPITAL | Age: 56
End: 2021-11-17

## 2021-11-17 ENCOUNTER — OFFICE VISIT (OUTPATIENT)
Dept: NEUROLOGY | Facility: CLINIC | Age: 56
End: 2021-11-17

## 2021-11-17 VITALS
HEART RATE: 69 BPM | BODY MASS INDEX: 26.63 KG/M2 | TEMPERATURE: 97.7 F | DIASTOLIC BLOOD PRESSURE: 72 MMHG | WEIGHT: 156 LBS | SYSTOLIC BLOOD PRESSURE: 118 MMHG | HEIGHT: 64 IN | OXYGEN SATURATION: 98 %

## 2021-11-17 DIAGNOSIS — G35 MULTIPLE SCLEROSIS (HCC): ICD-10-CM

## 2021-11-17 DIAGNOSIS — G35 MULTIPLE SCLEROSIS (HCC): Primary | ICD-10-CM

## 2021-11-17 DIAGNOSIS — F33.9 EPISODE OF RECURRENT MAJOR DEPRESSIVE DISORDER, UNSPECIFIED DEPRESSION EPISODE SEVERITY (HCC): Chronic | ICD-10-CM

## 2021-11-17 DIAGNOSIS — R53.82 CHRONIC FATIGUE: ICD-10-CM

## 2021-11-17 DIAGNOSIS — M79.2 NEUROPATHIC PAIN: ICD-10-CM

## 2021-11-17 DIAGNOSIS — G43.109 OCULAR MIGRAINE: ICD-10-CM

## 2021-11-17 PROBLEM — H11.429: Status: ACTIVE | Noted: 2021-11-17

## 2021-11-17 LAB
ALBUMIN SERPL-MCNC: 4.9 G/DL (ref 3.5–5.2)
ALBUMIN/GLOB SERPL: 2.2 G/DL
ALP SERPL-CCNC: 93 U/L (ref 39–117)
ALT SERPL W P-5'-P-CCNC: 29 U/L (ref 1–33)
AMPHET+METHAMPHET UR QL: NEGATIVE
AMPHETAMINES UR QL: NEGATIVE
ANION GAP SERPL CALCULATED.3IONS-SCNC: 7.3 MMOL/L (ref 5–15)
AST SERPL-CCNC: 30 U/L (ref 1–32)
BARBITURATES UR QL SCN: NEGATIVE
BASOPHILS # BLD AUTO: 0.02 10*3/MM3 (ref 0–0.2)
BASOPHILS NFR BLD AUTO: 0.7 % (ref 0–1.5)
BENZODIAZ UR QL SCN: NEGATIVE
BILIRUB SERPL-MCNC: 0.2 MG/DL (ref 0–1.2)
BUN SERPL-MCNC: 16 MG/DL (ref 6–20)
BUN/CREAT SERPL: 17.6 (ref 7–25)
BUPRENORPHINE SERPL-MCNC: NEGATIVE NG/ML
CALCIUM SPEC-SCNC: 9.9 MG/DL (ref 8.6–10.5)
CANNABINOIDS SERPL QL: NEGATIVE
CHLORIDE SERPL-SCNC: 103 MMOL/L (ref 98–107)
CO2 SERPL-SCNC: 29.7 MMOL/L (ref 22–29)
COCAINE UR QL: NEGATIVE
CREAT SERPL-MCNC: 0.91 MG/DL (ref 0.57–1)
DEPRECATED RDW RBC AUTO: 45.4 FL (ref 37–54)
EOSINOPHIL # BLD AUTO: 0.06 10*3/MM3 (ref 0–0.4)
EOSINOPHIL NFR BLD AUTO: 2.1 % (ref 0.3–6.2)
ERYTHROCYTE [DISTWIDTH] IN BLOOD BY AUTOMATED COUNT: 12.7 % (ref 12.3–15.4)
GFR SERPL CREATININE-BSD FRML MDRD: 64 ML/MIN/1.73
GLOBULIN UR ELPH-MCNC: 2.2 GM/DL
GLUCOSE SERPL-MCNC: 98 MG/DL (ref 65–99)
HCT VFR BLD AUTO: 38.8 % (ref 34–46.6)
HGB BLD-MCNC: 13.1 G/DL (ref 12–15.9)
IMM GRANULOCYTES # BLD AUTO: 0.01 10*3/MM3 (ref 0–0.05)
IMM GRANULOCYTES NFR BLD AUTO: 0.4 % (ref 0–0.5)
LYMPHOCYTES # BLD AUTO: 0.5 10*3/MM3 (ref 0.7–3.1)
LYMPHOCYTES NFR BLD AUTO: 17.8 % (ref 19.6–45.3)
MCH RBC QN AUTO: 32.7 PG (ref 26.6–33)
MCHC RBC AUTO-ENTMCNC: 33.8 G/DL (ref 31.5–35.7)
MCV RBC AUTO: 96.8 FL (ref 79–97)
METHADONE UR QL SCN: NEGATIVE
MONOCYTES # BLD AUTO: 0.52 10*3/MM3 (ref 0.1–0.9)
MONOCYTES NFR BLD AUTO: 18.5 % (ref 5–12)
NEUTROPHILS NFR BLD AUTO: 1.7 10*3/MM3 (ref 1.7–7)
NEUTROPHILS NFR BLD AUTO: 60.5 % (ref 42.7–76)
NRBC BLD AUTO-RTO: 0 /100 WBC (ref 0–0.2)
OPIATES UR QL: NEGATIVE
OXYCODONE UR QL SCN: NEGATIVE
PCP UR QL SCN: NEGATIVE
PLATELET # BLD AUTO: 191 10*3/MM3 (ref 140–450)
PMV BLD AUTO: 13 FL (ref 6–12)
POTASSIUM SERPL-SCNC: 4.8 MMOL/L (ref 3.5–5.2)
PROPOXYPH UR QL: NEGATIVE
PROT SERPL-MCNC: 7.1 G/DL (ref 6–8.5)
RBC # BLD AUTO: 4.01 10*6/MM3 (ref 3.77–5.28)
SODIUM SERPL-SCNC: 140 MMOL/L (ref 136–145)
TRICYCLICS UR QL SCN: NEGATIVE
WBC NRBC COR # BLD: 2.81 10*3/MM3 (ref 3.4–10.8)

## 2021-11-17 PROCEDURE — 36415 COLL VENOUS BLD VENIPUNCTURE: CPT

## 2021-11-17 PROCEDURE — 85025 COMPLETE CBC W/AUTO DIFF WBC: CPT

## 2021-11-17 PROCEDURE — 80306 DRUG TEST PRSMV INSTRMNT: CPT

## 2021-11-17 PROCEDURE — 80053 COMPREHEN METABOLIC PANEL: CPT

## 2021-11-17 PROCEDURE — 99214 OFFICE O/P EST MOD 30 MIN: CPT | Performed by: NURSE PRACTITIONER

## 2021-11-17 NOTE — PROGRESS NOTES
Neuro Office Visit      Encounter Date: 2021   Patient Name: Mckenna Castellano  : 1965   MRN: [id number]     Chief Complaint:    Chief Complaint   Patient presents with   • Multiple Sclerosis       History of Present Illness: Mckenna Castellano is a 56 y.o. female who is here today in Neurology for MS    Last visit21 w Dr Stover-No change in treatment. Cont Gilenya, lyrica, provigil, wellbutrin.      RRMS  Taking Gilenya without side effects.  MRI brain 11/3/20 few scattered T2 lesions. PVWM and medulla without new enhancing or enlarging lesions.    No vision changes, occasional slurred speech, has left sided weakness in thigh. Had fall this morning, tripping over a table, not due to weakness.     Neuropathic pain  Lyrica 200mg q hs. Compliant with meds and no side effects. Feet and hands with bilat N/T. Can be worse at night in her feet.  Patient resides in [place] where THC is legal. She has consumed edibles today, so UDS may be positive.    Fatigue  Provigil prn for fatigue. Receiving benefit. Takes during season change. Last one was 5 months ago.  Heat intolerance and fatigue are moderate.    Dysphagia  Increased fullness and abd distention is improved. Colonoscopy is up to date.  Choking is resolved.     MDD  Stable on Wellbutrin. Can get overwhelmed at times as a  but can self regulate with deep breathing    Ocular migraine  2 years since last episode. Flashing bright lights in vision with blurred vision-no pain    Problem History  Dx  by Dr Fraser with numbness in both legs. Treated w interferon x 2 months caused N/V. Copaxone for 4-5 years, then Gilenya. Moved to [facility] due to low counts and devloped new left medullary lesion with leg weakness. Stopped Aubagio due to ins issues.    Subjective      Past Medical History:   Past Medical History:   Diagnosis Date   • Anxiety    • Depression    • Hyperlipidemia    • Migraine    • MS (multiple sclerosis) (HCC)        Past  Surgical History:   Past Surgical History:   Procedure Laterality Date   • AUGMENTATION MAMMAPLASTY Bilateral 2012   • HYSTERECTOMY N/A    • TONSILLECTOMY         Family History:   Family History   Adopted: Yes   Problem Relation Age of Onset   • Breast cancer Neg Hx    • Ovarian cancer Neg Hx        Social History:   Social History     Socioeconomic History   • Marital status:    Tobacco Use   • Smoking status: Never Smoker   • Smokeless tobacco: Never Used   Vaping Use   • Vaping Use: Never used   Substance and Sexual Activity   • Alcohol use: No   • Drug use: No   • Sexual activity: Defer       Medications:     Current Outpatient Medications:   •  buPROPion XL (Wellbutrin XL) 300 MG 24 hr tablet, Take 1 tablet by mouth Every Morning., Disp: 90 tablet, Rfl: 3  •  fingolimod (Gilenya) 0.5 MG capsule, Take 1 capsule by mouth Daily., Disp: 30 capsule, Rfl: 11  •  modafinil (PROVIGIL) 200 MG tablet, Take 1 tablet by mouth Daily As Needed (fatigue)., Disp: 90 tablet, Rfl: 1  •  ondansetron (Zofran) 4 MG tablet, Take 1 tablet by mouth Every 12 (Twelve) Hours As Needed for Nausea or Vomiting., Disp: 60 tablet, Rfl: 1  •  pregabalin (Lyrica) 200 MG capsule, Take 1 capsule by mouth 2 (Two) Times a Day., Disp: 180 capsule, Rfl: 1  •  rosuvastatin (CRESTOR) 10 MG tablet, Take 10 mg by mouth Every Night., Disp: , Rfl: 0  •  DUREZOL 0.05 % ophthalmic emulsion, INSTILL 1 DROP INTO OD TID, Disp: , Rfl:     Allergies:   Allergies   Allergen Reactions   • Ciprofloxacin Hives   • Zyrtec [Cetirizine] Unknown - Low Severity     Patient does not prefer to take; can tolerate allegra, claritin and benadryl       PHQ-9 Total Score:     STEADI Fall Risk Assessment has not been completed.    Objective     Physical Exam:   Physical Exam  Eyes:      Pupils: Pupils are equal, round, and reactive to light.   Neurological:      Mental Status: She is oriented to person, place, and time.      Coordination: Heel to Shin Test abnormal.  Finger-Nose-Finger Test and Romberg Test normal.      Gait: Tandem walk abnormal.      Deep Tendon Reflexes:      Reflex Scores:       Tricep reflexes are 2+ on the right side and 2+ on the left side.       Bicep reflexes are 2+ on the right side and 2+ on the left side.       Brachioradialis reflexes are 2+ on the right side and 2+ on the left side.       Patellar reflexes are 2+ on the right side and 2+ on the left side.       Achilles reflexes are 2+ on the right side and 2+ on the left side.  Psychiatric:         Speech: Speech normal.         Neurologic Exam     Mental Status   Oriented to person, place, and time.   Follows 3 step commands.   Attention: normal. Concentration: normal.   Speech: speech is normal   Level of consciousness: alert  Knowledge: consistent with education.   Normal comprehension.     Cranial Nerves     CN III, IV, VI   Pupils are equal, round, and reactive to light.  Right pupil: Accommodation: intact.   Left pupil: Accommodation: intact.   CN III: no CN III palsy  CN VI: no CN VI palsy  Nystagmus: none   Diplopia: none  Upgaze: normal  Downgaze: normal  Conjugate gaze: present    CN VII   Facial expression full, symmetric.     CN VIII   Hearing: intact    CN XII   CN XII normal.     Motor Exam   Muscle bulk: normal  Overall muscle tone: normal    Strength   Right biceps: 5/5  Left biceps: 5/5  Right triceps: 5/5  Left triceps: 5/5  Right interossei: 5/5  Left interossei: 5/5  Right quadriceps: 5/5  Left quadriceps: 5/5  Right anterior tibial: 5/5  Left anterior tibial: 5/5  Right posterior tibial: 5/5  Left posterior tibial: 5/5    Sensory Exam   Light touch normal.     Gait, Coordination, and Reflexes     Coordination   Romberg: negative  Finger to nose coordination: normal  Heel to shin coordination: abnormal  Tandem walking coordination: abnormal    Tremor   Resting tremor: absent  Action tremor: absent    Reflexes   Right brachioradialis: 2+  Left brachioradialis: 2+  Right biceps:  "2+  Left biceps: 2+  Right triceps: 2+  Left triceps: 2+  Right patellar: 2+  Left patellar: 2+  Right achilles: 2+  Left achilles: 2+  Right : 2+  Left : 2+       Vital Signs:   Vitals:    11/17/21 1504   BP: 118/72   Pulse: 69   Temp: 97.7 °F (36.5 °C)   SpO2: 98%   Weight: 70.8 kg (156 lb)   Height: 162.6 cm (64.02\")     Body mass index is 26.76 kg/m².         Assessment / Plan      Assessment/Plan:   Diagnoses and all orders for this visit:    1. Multiple sclerosis (HCC) (Primary)  Comments:  Cont Gilenya  Orders:  -     Urine Drug Screen - Urine, Clean Catch; Future  -     CBC Auto Differential; Future  -     Comprehensive Metabolic Panel; Future  -     MRI Brain With & Without Contrast; Future  -     MRI Cervical Spine With & Without Contrast; Future    2. Neuropathic pain  Comments:  Cont Lyrica. Controlled substance contract signed    3. Episode of recurrent major depressive disorder, unspecified depression episode severity (HCC)  Comments:  Cont Wellbutrin    4. Ocular migraine    5. Chronic fatigue  Comments:  Cont Provigil prn           Patient Education:    Reviewed medications, potential side effects and signs and symptoms to report. Discussed risk versus benefits of treatment plan with patient and/or family-including medications, labs and radiology that may be ordered. Addressed questions and concerns during visit. Patient and/or family verbalized understanding and agree with plan. Instructed to call the office with any questions and report to ER with any life-threatening symptoms.     Follow Up:   Return in about 6 months (around 5/17/2022) for Recheck.    During this visit the following were done:  Labs Reviewed [x]    Labs Ordered [x]    Radiology Reports Reviewed [x]    Radiology Ordered [x]    PCP Records Reviewed []    Referring Provider Records Reviewed []    ER Records Reviewed []    Hospital Records Reviewed []    History Obtained From Family []    Radiology Images Reviewed []    Other " Reviewed [x]    Records Requested []       Clif Mills, DNP, APRN

## 2021-11-22 ENCOUNTER — TRANSCRIBE ORDERS (OUTPATIENT)
Dept: ADMINISTRATIVE | Facility: HOSPITAL | Age: 56
End: 2021-11-22

## 2021-11-22 DIAGNOSIS — Z12.31 VISIT FOR SCREENING MAMMOGRAM: Primary | ICD-10-CM

## 2021-12-13 DIAGNOSIS — R53.82 CHRONIC FATIGUE: ICD-10-CM

## 2021-12-13 DIAGNOSIS — M79.2 NEUROPATHIC PAIN: ICD-10-CM

## 2021-12-13 DIAGNOSIS — G35 MULTIPLE SCLEROSIS (HCC): ICD-10-CM

## 2021-12-13 RX ORDER — MODAFINIL 200 MG/1
200 TABLET ORAL DAILY PRN
Qty: 90 TABLET | Refills: 1 | Status: SHIPPED | OUTPATIENT
Start: 2021-12-13 | End: 2022-05-20 | Stop reason: SDUPTHER

## 2021-12-13 RX ORDER — PREGABALIN 200 MG/1
200 CAPSULE ORAL 2 TIMES DAILY
Qty: 180 CAPSULE | Refills: 1 | Status: SHIPPED | OUTPATIENT
Start: 2021-12-13 | End: 2022-02-14 | Stop reason: SDUPTHER

## 2021-12-13 NOTE — TELEPHONE ENCOUNTER
Rx Refill Note  Requested Prescriptions     Pending Prescriptions Disp Refills   • pregabalin (Lyrica) 200 MG capsule 180 capsule 1     Sig: Take 1 capsule by mouth 2 (Two) Times a Day.   • modafinil (PROVIGIL) 200 MG tablet 90 tablet 1     Sig: Take 1 tablet by mouth Daily As Needed (fatigue).      Last filled: 5/20/21, 90 day with 1 refill  Last office visit with prescribing clinician: 5/20/2021      Next office visit with prescribing clinician: 5/20/2022

## 2021-12-13 NOTE — TELEPHONE ENCOUNTER
Caller: ALEXANDRIA Cornelius call back number: [phone]    Requested Prescriptions: PREGABALIN 200 MG / MODAFINIL 200 MG     Requested Prescriptions      No prescriptions requested or ordered in this encounter        Pharmacy where request should be sent:  [facility] - [place], [place] - [address] [facility] - [phone] PH - [phone] FX  [phone]      Additional details provided by patient: PT IS IN [place] AND NEEDS THESE RX SENT TO THIS PHARMACY AS SHE WILL BE THERE MOST OF THE WINTER. CAN YOU DO REFILLS FOR  A YEAR  TO THIS PHARMACY SHE IS OUT OF RX AND NEEDS ASAP     Does the patient have less than a 3 day supply:  [x] Yes  [] No    PLEASE ADVISE.     John CAIN Rep   12/13/21 14:02 EST

## 2021-12-29 ENCOUNTER — TELEPHONE (OUTPATIENT)
Dept: NEUROLOGY | Facility: CLINIC | Age: 56
End: 2021-12-29

## 2021-12-29 NOTE — TELEPHONE ENCOUNTER
Left message for patient to call back regarding MRI. MRI brain is stable. Questionable lesion in cord by radiologist. Dr Stover reviewed as well. Does not see lesion. Recommend repeat MRI brain and c-spine in one year or sooner new or worsening symptoms.

## 2022-01-07 ENCOUNTER — TELEPHONE (OUTPATIENT)
Dept: NEUROLOGY | Facility: CLINIC | Age: 57
End: 2022-01-07

## 2022-01-07 NOTE — TELEPHONE ENCOUNTER
Clif Mills, ERMELINDA, Linda Booker MA  Please mail her a copy of MRI report. She needs to repeat the MRI of brain and c-spine in one year or sooner if she has new symptoms.

## 2022-01-14 ENCOUNTER — TELEPHONE (OUTPATIENT)
Dept: NEUROLOGY | Facility: CLINIC | Age: 57
End: 2022-01-14

## 2022-01-14 ENCOUNTER — SPECIALTY PHARMACY (OUTPATIENT)
Dept: ONCOLOGY | Facility: HOSPITAL | Age: 57
End: 2022-01-14

## 2022-01-14 RX ORDER — FINGOLIMOD HYDROCHLORIDE 0.5 MG/1
0.5 CAPSULE ORAL DAILY
Qty: 30 CAPSULE | Refills: 11 | Status: SHIPPED | OUTPATIENT
Start: 2022-01-14 | End: 2022-11-29 | Stop reason: SDUPTHER

## 2022-01-14 RX ORDER — FINGOLIMOD HYDROCHLORIDE 0.5 MG/1
0.5 CAPSULE ORAL DAILY
Qty: 30 CAPSULE | Refills: 11 | Status: CANCELLED | OUTPATIENT
Start: 2022-01-14

## 2022-01-14 NOTE — TELEPHONE ENCOUNTER
Caller: Mckenna Castellano    Relationship: Self    Best call back number: [phone]    Requested Prescriptions:   Requested Prescriptions     Pending Prescriptions Disp Refills   • fingolimod (Gilenya) 0.5 MG capsule 30 capsule 11     Sig: Take 1 capsule by mouth Daily.        Pharmacy where request should be sent:    [facility]  [phone]    Additional details provided by patient:   PT SAID THE PHARM TOLD HER THE RX REQUIRED A PLE  BE SUBMITTED.    PT ALSO IS WAITING TO BE CALLED TO RECEIVE HER MRI RESULTS.    Does the patient have less than a 3 day supply:  [] Yes  [x] No    John Yadav Rep   01/14/22 10:42 EST

## 2022-01-18 ENCOUNTER — SPECIALTY PHARMACY (OUTPATIENT)
Dept: ONCOLOGY | Facility: HOSPITAL | Age: 57
End: 2022-01-18

## 2022-01-18 NOTE — TELEPHONE ENCOUNTER
Caller: Mckenna Castellano    Relationship: Self    Best call back number: [phone]    What medications are you currently taking:   Current Outpatient Medications on File Prior to Visit   Medication Sig Dispense Refill   • buPROPion XL (Wellbutrin XL) 300 MG 24 hr tablet Take 1 tablet by mouth Every Morning. 90 tablet 3   • modafinil (PROVIGIL) 200 MG tablet Take 1 tablet by mouth Daily As Needed (fatigue). 90 tablet 1   • ondansetron (Zofran) 4 MG tablet Take 1 tablet by mouth Every 12 (Twelve) Hours As Needed for Nausea or Vomiting. 60 tablet 1   • pregabalin (Lyrica) 200 MG capsule Take 1 capsule by mouth 2 (Two) Times a Day. 180 capsule 1   • rosuvastatin (CRESTOR) 10 MG tablet Take 10 mg by mouth Every Night.  0     No current facility-administered medications on file prior to visit.        Which medication are you concerned about: BEVERLY    Who prescribed you this medication: DR. RIVERO    What are your concerns: PT STATES THAT SHE RECEIVED A [employer] MESSAGE THAT HER RX HAS BEEN DENIED.  PT IS HIGHLY CONCERNED AS TO WHY IT HAS BEEN DENIED BECAUSE THERE IS NO REASON WHY AND SHE ONLY HAS 7 DAYS LEFT.  WARM TRANSFERRED PT TO PRACTICE SO SHE COULD LEAVE A  FOR JONATHAN BECAUSE HE IS OUT TODAY BUT CHECKING HIS MESSAGES PERIODICALLY.

## 2022-01-18 NOTE — PROGRESS NOTES
Specialty Pharmacy Refill Coordination Note     Mckenna is a 57 y.o. female contacted today regarding refills of Gilenya specialty medication(s). Patient has to fill with [facility].    Reviewed and verified with patient: Yes  Specialty medication(s) and dose(s) confirmed: yes                   Follow-up: 12/30/2022.     Cornelia Rehman, Pharmacy Technician  Specialty Pharmacy Technician

## 2022-01-21 ENCOUNTER — TELEPHONE (OUTPATIENT)
Dept: NEUROLOGY | Facility: CLINIC | Age: 57
End: 2022-01-21

## 2022-01-21 NOTE — TELEPHONE ENCOUNTER
----- Message from Marge Mackenzie RN sent at 1/21/2022 10:21 AM EST -----  Regarding: Follow up about MyChart medication message  Pt would like to talk with you about MRI findings?    ----- Message -----  From: Mckenna Castellano  Sent: 1/21/2022  10:03 AM EST  To: [facility] [place]  Subject: Follow up about MyChart medication message       It is a little inconvenient to be honest....    o.k.......I appreciate you responding so fast!  I left a message to review my MRI with Clif, and am waiting for the callback. She did call me initially, and I could not answer. Would you please share that I would like to discuss the new findings of my MRI with her?  Thank you!  Kat

## 2022-02-11 ENCOUNTER — APPOINTMENT (OUTPATIENT)
Dept: MAMMOGRAPHY | Facility: HOSPITAL | Age: 57
End: 2022-02-11

## 2022-02-14 DIAGNOSIS — M79.2 NEUROPATHIC PAIN: ICD-10-CM

## 2022-02-14 RX ORDER — PREGABALIN 200 MG/1
200 CAPSULE ORAL 2 TIMES DAILY
Qty: 180 CAPSULE | Refills: 1 | Status: SHIPPED | OUTPATIENT
Start: 2022-02-14 | End: 2022-05-20 | Stop reason: SDUPTHER

## 2022-02-14 NOTE — TELEPHONE ENCOUNTER
Patient goes back n forth between [place] and [place]. Needs Lyrica and she is here now and they won't transfer state to state.    Pending to provider to approve at [facility] in [place].

## 2022-05-16 ENCOUNTER — DOCUMENTATION (OUTPATIENT)
Dept: ONCOLOGY | Facility: HOSPITAL | Age: 57
End: 2022-05-16

## 2022-05-20 ENCOUNTER — LAB (OUTPATIENT)
Dept: LAB | Facility: HOSPITAL | Age: 57
End: 2022-05-20

## 2022-05-20 ENCOUNTER — OFFICE VISIT (OUTPATIENT)
Dept: NEUROLOGY | Facility: CLINIC | Age: 57
End: 2022-05-20

## 2022-05-20 VITALS
HEIGHT: 64 IN | SYSTOLIC BLOOD PRESSURE: 118 MMHG | TEMPERATURE: 97.3 F | BODY MASS INDEX: 26.67 KG/M2 | HEART RATE: 79 BPM | DIASTOLIC BLOOD PRESSURE: 76 MMHG | WEIGHT: 156.2 LBS | OXYGEN SATURATION: 99 %

## 2022-05-20 DIAGNOSIS — R13.19 OTHER DYSPHAGIA: ICD-10-CM

## 2022-05-20 DIAGNOSIS — R53.82 CHRONIC FATIGUE: ICD-10-CM

## 2022-05-20 DIAGNOSIS — F33.9 EPISODE OF RECURRENT MAJOR DEPRESSIVE DISORDER, UNSPECIFIED DEPRESSION EPISODE SEVERITY: ICD-10-CM

## 2022-05-20 DIAGNOSIS — R41.89 COGNITIVE DECLINE: ICD-10-CM

## 2022-05-20 DIAGNOSIS — G35 MULTIPLE SCLEROSIS: ICD-10-CM

## 2022-05-20 DIAGNOSIS — G35 MULTIPLE SCLEROSIS: Primary | ICD-10-CM

## 2022-05-20 DIAGNOSIS — G43.109 OCULAR MIGRAINE: ICD-10-CM

## 2022-05-20 DIAGNOSIS — M79.2 NEUROPATHIC PAIN: ICD-10-CM

## 2022-05-20 LAB
ALBUMIN SERPL-MCNC: 4.8 G/DL (ref 3.5–5.2)
ALBUMIN/GLOB SERPL: 2.3 G/DL
ALP SERPL-CCNC: 98 U/L (ref 39–117)
ALT SERPL W P-5'-P-CCNC: 31 U/L (ref 1–33)
ANION GAP SERPL CALCULATED.3IONS-SCNC: 12.7 MMOL/L (ref 5–15)
AST SERPL-CCNC: 38 U/L (ref 1–32)
BASOPHILS # BLD AUTO: 0.03 10*3/MM3 (ref 0–0.2)
BASOPHILS NFR BLD AUTO: 1.3 % (ref 0–1.5)
BILIRUB SERPL-MCNC: 0.3 MG/DL (ref 0–1.2)
BUN SERPL-MCNC: 18 MG/DL (ref 6–20)
BUN/CREAT SERPL: 18.9 (ref 7–25)
CALCIUM SPEC-SCNC: 9.8 MG/DL (ref 8.6–10.5)
CHLORIDE SERPL-SCNC: 104 MMOL/L (ref 98–107)
CO2 SERPL-SCNC: 25.3 MMOL/L (ref 22–29)
CREAT SERPL-MCNC: 0.95 MG/DL (ref 0.57–1)
DEPRECATED RDW RBC AUTO: 42.5 FL (ref 37–54)
EGFRCR SERPLBLD CKD-EPI 2021: 69.6 ML/MIN/1.73
EOSINOPHIL # BLD AUTO: 0.07 10*3/MM3 (ref 0–0.4)
EOSINOPHIL NFR BLD AUTO: 3 % (ref 0.3–6.2)
ERYTHROCYTE [DISTWIDTH] IN BLOOD BY AUTOMATED COUNT: 12.3 % (ref 12.3–15.4)
GLOBULIN UR ELPH-MCNC: 2.1 GM/DL
GLUCOSE SERPL-MCNC: 114 MG/DL (ref 65–99)
HCT VFR BLD AUTO: 35.5 % (ref 34–46.6)
HGB BLD-MCNC: 12.4 G/DL (ref 12–15.9)
IMM GRANULOCYTES # BLD AUTO: 0.01 10*3/MM3 (ref 0–0.05)
IMM GRANULOCYTES NFR BLD AUTO: 0.4 % (ref 0–0.5)
LYMPHOCYTES # BLD AUTO: 0.3 10*3/MM3 (ref 0.7–3.1)
LYMPHOCYTES NFR BLD AUTO: 13 % (ref 19.6–45.3)
MCH RBC QN AUTO: 33.2 PG (ref 26.6–33)
MCHC RBC AUTO-ENTMCNC: 34.9 G/DL (ref 31.5–35.7)
MCV RBC AUTO: 94.9 FL (ref 79–97)
MONOCYTES # BLD AUTO: 0.37 10*3/MM3 (ref 0.1–0.9)
MONOCYTES NFR BLD AUTO: 16 % (ref 5–12)
NEUTROPHILS NFR BLD AUTO: 1.53 10*3/MM3 (ref 1.7–7)
NEUTROPHILS NFR BLD AUTO: 66.3 % (ref 42.7–76)
NRBC BLD AUTO-RTO: 0.4 /100 WBC (ref 0–0.2)
PLATELET # BLD AUTO: 187 10*3/MM3 (ref 140–450)
PMV BLD AUTO: 12.8 FL (ref 6–12)
POTASSIUM SERPL-SCNC: 4.9 MMOL/L (ref 3.5–5.2)
PROT SERPL-MCNC: 6.9 G/DL (ref 6–8.5)
RBC # BLD AUTO: 3.74 10*6/MM3 (ref 3.77–5.28)
SODIUM SERPL-SCNC: 142 MMOL/L (ref 136–145)
WBC NRBC COR # BLD: 2.31 10*3/MM3 (ref 3.4–10.8)

## 2022-05-20 PROCEDURE — 80053 COMPREHEN METABOLIC PANEL: CPT

## 2022-05-20 PROCEDURE — 85025 COMPLETE CBC W/AUTO DIFF WBC: CPT

## 2022-05-20 PROCEDURE — 36415 COLL VENOUS BLD VENIPUNCTURE: CPT

## 2022-05-20 PROCEDURE — 99214 OFFICE O/P EST MOD 30 MIN: CPT | Performed by: NURSE PRACTITIONER

## 2022-05-20 RX ORDER — PREGABALIN 200 MG/1
200 CAPSULE ORAL 2 TIMES DAILY
Qty: 180 CAPSULE | Refills: 1 | Status: SHIPPED | OUTPATIENT
Start: 2022-05-20 | End: 2022-08-10 | Stop reason: SDUPTHER

## 2022-05-20 RX ORDER — MODAFINIL 200 MG/1
200 TABLET ORAL DAILY PRN
Qty: 90 TABLET | Refills: 1 | Status: SHIPPED | OUTPATIENT
Start: 2022-05-20 | End: 2022-06-03 | Stop reason: SDUPTHER

## 2022-05-20 NOTE — PROGRESS NOTES
Neuro Office Visit      Encounter Date: 2022   Patient Name: Mckenna Castellano  : 1965   MRN: [id number]   PCP: Dr Huerta  Chief Complaint:    Chief Complaint   Patient presents with   • Multiple Sclerosis       History of Present Illness: Mckenna Castellano is a 57 y.o. female who is here today in Neurology for MS, neuropathic pain, fatigue, dysphagia, MDD, ocular migraine    Last visit 2021 w me-cont Gilenya, labs, mri brain and c-spine, cont Lyrica, wellbutrin, provigil      MRI Brain With & Without Contrast (2021)-no new lesions  MRI Cervical Spine With & Without Contrast (2021 15:45)-new cervicomedullary lesion on right but our review is no new lesion  Labs: Abs Lymphs 0.50, CMP stable, UDS-neg    RRMS    25ft timed walk Trial 1 (Regular) : 7.29  25ft timed walk Trial 2  (Fast) : 5.05     Number of falls in the past year:: 1  Right SLS in seconds:: 20  Left SLS in seconds:: 20  Right Hand Trial 1:: 18.5  Right Hand Trial 2:: 17.48  Left Hand Trial 1:: 17.27  Left Hand Trial 2:: 18.89  Dominant Hand?: Right   strength R Hand Trial 1: 78   strength R Hand Trial 2: 86   strength L Hand Trial 1: 78   strength L Hand Trial 2: 78  SDMT: 52  Written or verbal?: Written        PBA-CNS-LS: 13  Passed self-questionnaire: No (6)  Passed 3oz water test: Yes        Taking Gilenya. Compliant with meds. No side effects.    Vision changes: no diplopia or loss of vision  Slurred speech: none  Dysphagia: coughing w food, water and pills. Globus sensation  Bladder: no urgency  Bowel: constipation is controlled  Skin: left index finger nail bed ridge x 2 years  Paresthesia: Right hand to shoulder and feet at night  Weakness: left side weakness and decreased tone. Right hand long and ring finger are stiff.  Fatigue: heat sensitive  Pain: contolled  Moods: stable  Gait: steady  Falls: only on the ice.      Problem History  Dx  by Dr Fraser with numbness in both legs. Treated w interferon  x 2 months caused N/V. Copaxone for 4-5 years, then Gilenya. Moved to [facility] due to low counts and devloped new left medullary lesion with leg weakness. Stopped Aubagio due to ins issues.    Neuropathic pain/RLS  Taking Lyrica 200mg q hs. Compliant w meds. No SE. Feet and hands w bilat N/T. Worse at night in feet.  Lives in [place] and uses THC which is legal there.    Fatigue  Taking Provigil for fatigue. Taking prn. No side effects.    Dysphagia  Increased fullness and abd distention is improved. Choking is resolved.    MDD  Stable on Wellbutrin. Can get overwhelmed as a . Uses deep breathing. Remodeling house in [place]    Ocular Migraine  Last episode 2 years ago. Flashing bright lights in vision. Non-painful blurred vision.      Subjective      Past Medical History:   Past Medical History:   Diagnosis Date   • Anxiety    • Depression    • Hyperlipidemia    • Migraine    • MS (multiple sclerosis) (HCC)        Past Surgical History:   Past Surgical History:   Procedure Laterality Date   • AUGMENTATION MAMMAPLASTY Bilateral 2012   • HYSTERECTOMY N/A    • TONSILLECTOMY         Family History:   Family History   Adopted: Yes   Problem Relation Age of Onset   • Breast cancer Neg Hx    • Ovarian cancer Neg Hx        Social History:   Social History     Socioeconomic History   • Marital status:    Tobacco Use   • Smoking status: Never Smoker   • Smokeless tobacco: Never Used   Vaping Use   • Vaping Use: Never used   Substance and Sexual Activity   • Alcohol use: No   • Drug use: No   • Sexual activity: Defer       Medications:     Current Outpatient Medications:   •  buPROPion XL (Wellbutrin XL) 300 MG 24 hr tablet, Take 1 tablet by mouth Every Morning., Disp: 90 tablet, Rfl: 3  •  fingolimod (Gilenya) 0.5 MG capsule, Take 1 capsule by mouth Daily., Disp: 30 capsule, Rfl: 11  •  modafinil (PROVIGIL) 200 MG tablet, Take 1 tablet by mouth Daily As Needed (fatigue)., Disp: 90 tablet, Rfl: 1  •   ondansetron (Zofran) 4 MG tablet, Take 1 tablet by mouth Every 12 (Twelve) Hours As Needed for Nausea or Vomiting., Disp: 60 tablet, Rfl: 1  •  pregabalin (Lyrica) 200 MG capsule, Take 1 capsule by mouth 2 (Two) Times a Day., Disp: 180 capsule, Rfl: 1  •  rosuvastatin (CRESTOR) 10 MG tablet, Take 10 mg by mouth Every Night., Disp: , Rfl: 0    Allergies:   Allergies   Allergen Reactions   • Ciprofloxacin Hives   • Zyrtec [Cetirizine] Unknown - Low Severity     Patient does not prefer to take; can tolerate allegra, claritin and benadryl       PHQ-9 Total Score:     [facility] Fall Risk Assessment has not been completed.    Objective     Physical Exam:   Physical Exam  Eyes:      Pupils: Pupils are equal, round, and reactive to light.   Neurological:      Mental Status: She is oriented to person, place, and time.      Coordination: Heel to Shin Test abnormal. Romberg Test normal.      Gait: Tandem walk abnormal.      Deep Tendon Reflexes:      Reflex Scores:       Tricep reflexes are 2+ on the right side and 2+ on the left side.       Bicep reflexes are 2+ on the right side and 2+ on the left side.       Brachioradialis reflexes are 2+ on the right side and 2+ on the left side.       Patellar reflexes are 2+ on the right side and 2+ on the left side.       Achilles reflexes are 2+ on the right side and 2+ on the left side.  Psychiatric:         Speech: Speech normal.         Neurologic Exam     Mental Status   Oriented to person, place, and time.   Follows 3 step commands.   Attention: normal. Concentration: normal.   Speech: speech is normal   Level of consciousness: alert  Knowledge: consistent with education.   Normal comprehension.     Cranial Nerves     CN III, IV, VI   Pupils are equal, round, and reactive to light.  Right pupil: Accommodation: intact.   Left pupil: Accommodation: intact.   CN III: no CN III palsy  CN VI: no CN VI palsy  Nystagmus: none   Diplopia: none  Upgaze: normal  Downgaze: normal  Conjugate  "gaze: present    CN VII   Facial expression full, symmetric.     CN VIII   Hearing: intact    CN XII   CN XII normal.     Motor Exam   Muscle bulk: normal  Overall muscle tone: normal    Strength   Right biceps: 5/5  Left biceps: 5/5  Right triceps: 5/5  Left triceps: 5/5  Right interossei: 5/5  Left interossei: 5/5  Right quadriceps: 5/5  Left quadriceps: 5/5  Right anterior tibial: 5/5  Left anterior tibial: 5/5  Right posterior tibial: 5/5  Left posterior tibial: 5/5    Sensory Exam   Light touch normal.     Gait, Coordination, and Reflexes     Coordination   Romberg: negative  Heel to shin coordination: abnormal  Tandem walking coordination: abnormal    Tremor   Resting tremor: absent  Action tremor: absent    Reflexes   Right brachioradialis: 2+  Left brachioradialis: 2+  Right biceps: 2+  Left biceps: 2+  Right triceps: 2+  Left triceps: 2+  Right patellar: 2+  Left patellar: 2+  Right achilles: 2+  Left achilles: 2+  Right : 2+  Left : 2+       Vital Signs:   Vitals:    05/20/22 1418   BP: 118/76   Pulse: 79   Temp: 97.3 °F (36.3 °C)   SpO2: 99%   Weight: 70.9 kg (156 lb 3.2 oz)   Height: 162.6 cm (64.02\")     Body mass index is 26.8 kg/m².       Assessment / Plan      Assessment/Plan:   Diagnoses and all orders for this visit:    1. Multiple sclerosis (HCC) (Primary)  Comments:  Quirino Hager.   Labs today  MRI next visit  Orders:  -     Ambulatory Referral to Speech Therapy  -     CBC Auto Differential; Future  -     Comprehensive Metabolic Panel; Future  -     modafinil (PROVIGIL) 200 MG tablet; Take 1 tablet by mouth Daily As Needed (fatigue).  Dispense: 90 tablet; Refill: 1    2. Cognitive decline  Comments:  Refer to SLT  Orders:  -     Ambulatory Referral to Speech Therapy    3. Neuropathic pain  Comments:  Lyrica refilled  Orders:  -     modafinil (PROVIGIL) 200 MG tablet; Take 1 tablet by mouth Daily As Needed (fatigue).  Dispense: 90 tablet; Refill: 1  -     pregabalin (Lyrica) 200 MG " capsule; Take 1 capsule by mouth 2 (Two) Times a Day.  Dispense: 180 capsule; Refill: 1    4. Chronic fatigue  Comments:  Refill provigil  Orders:  -     modafinil (PROVIGIL) 200 MG tablet; Take 1 tablet by mouth Daily As Needed (fatigue).  Dispense: 90 tablet; Refill: 1    5. Other dysphagia  Comments:  Pt states is stable and declines eval at this time. Not aspirating    6. Episode of recurrent major depressive disorder, unspecified depression episode severity (HCC)  Comments:  Stable on Wellbutrin    7. Ocular migraine  Comments:  Stable on current meds.      As a part of this patient's therapy a controlled substance was prescribed. Instructed on the safe and proper use of this medication along with potential risks. Controlled substance contract signed and scanned. Uli will be reviewed and UDS obtained as indicated.    Patient Education:   Reviewed medications, potential side effects and signs and symptoms to report. Discussed risk versus benefits of treatment plan with patient and/or family-including medications, labs and radiology that may be ordered. Addressed questions and concerns during visit. Patient and/or family verbalized understanding and agree with plan. Instructed to call the office with any questions and report to ER with any life-threatening symptoms.     Follow Up:   6 months    During this visit the following were done:  Labs Reviewed []    Labs Ordered [x]    Radiology Reports Reviewed []    Radiology Ordered []    PCP Records Reviewed []    Referring Provider Records Reviewed []    ER Records Reviewed [x]    Hospital Records Reviewed [x]    History Obtained From Family []    Radiology Images Reviewed []    Other Reviewed [x]    Records Requested []      Clif Mills, ERMELINDA, APRN

## 2022-05-24 ENCOUNTER — TELEPHONE (OUTPATIENT)
Dept: NEUROLOGY | Facility: CLINIC | Age: 57
End: 2022-05-24

## 2022-05-24 NOTE — TELEPHONE ENCOUNTER
Called patient and gave results.  Patient was understanding.    ----- Message from Clif Mills DNP, GUILLE sent at 5/24/2022  1:08 PM EDT -----  Please let pt know labs are stable for her. Would like to repeat labs in 3-6 months.

## 2022-06-03 DIAGNOSIS — M79.2 NEUROPATHIC PAIN: ICD-10-CM

## 2022-06-03 DIAGNOSIS — G35 MULTIPLE SCLEROSIS: ICD-10-CM

## 2022-06-03 DIAGNOSIS — R53.82 CHRONIC FATIGUE: ICD-10-CM

## 2022-06-03 RX ORDER — MODAFINIL 200 MG/1
200 TABLET ORAL DAILY PRN
Qty: 90 TABLET | Refills: 2 | Status: SHIPPED | OUTPATIENT
Start: 2022-06-03 | End: 2023-02-24 | Stop reason: SDUPTHER

## 2022-06-03 RX ORDER — ONDANSETRON 4 MG/1
4 TABLET, FILM COATED ORAL EVERY 12 HOURS PRN
Qty: 60 TABLET | Refills: 5 | Status: SHIPPED | OUTPATIENT
Start: 2022-06-03

## 2022-06-03 RX ORDER — BUPROPION HYDROCHLORIDE 300 MG/1
300 TABLET ORAL EVERY MORNING
Qty: 90 TABLET | Refills: 5 | Status: SHIPPED | OUTPATIENT
Start: 2022-06-03 | End: 2023-02-24 | Stop reason: SDUPTHER

## 2022-06-03 NOTE — TELEPHONE ENCOUNTER
Rx Refill Note  Requested Prescriptions     Pending Prescriptions Disp Refills   • buPROPion XL (Wellbutrin XL) 300 MG 24 hr tablet 90 tablet 3     Sig: Take 1 tablet by mouth Every Morning.   • ondansetron (Zofran) 4 MG tablet 60 tablet 1     Sig: Take 1 tablet by mouth Every 12 (Twelve) Hours As Needed for Nausea or Vomiting.   • modafinil (PROVIGIL) 200 MG tablet 90 tablet 1     Sig: Take 1 tablet by mouth Daily As Needed (fatigue).      Last filled: Wellbutrin 05/20/2021 90 with 3 refills.  Zofran 02/09/2021 60 with 1 refill.  Provigil  05/20/2022 90 with 1 refill.  Last office visit with prescribing clinician: 5/20/2022      Next office visit with prescribing clinician: 11/22/2022     Sent in Zofran 60 with 5 refills.  Wellbutrin 90 with 5 refills.  Provigil 90 with 5 refills.    Livia Winkler MA  06/03/22, 09:46 EDT

## 2022-08-02 ENCOUNTER — TELEPHONE (OUTPATIENT)
Dept: NEUROLOGY | Facility: CLINIC | Age: 57
End: 2022-08-02

## 2022-08-02 NOTE — TELEPHONE ENCOUNTER
from [place] is calling to speak to . Not urgent just wants to see if she can stop one of her meds for a week.    Will provide you with his cell momentarily. Thanks!

## 2022-08-10 DIAGNOSIS — M79.2 NEUROPATHIC PAIN: ICD-10-CM

## 2022-08-10 RX ORDER — PREGABALIN 200 MG/1
200 CAPSULE ORAL 2 TIMES DAILY
Qty: 180 CAPSULE | Refills: 1 | Status: SHIPPED | OUTPATIENT
Start: 2022-08-10 | End: 2022-11-22

## 2022-08-10 NOTE — TELEPHONE ENCOUNTER
Caller: FELISA Cornelius call back number: [phone]    Requested Prescriptions:   PREGABALIN 200 MG     Requested Prescriptions      No prescriptions requested or ordered in this encounter        Pharmacy where request should be sent: [facility], [facility]. [place], [place] - [facility] Rikki Ayers. - [phone]  - [phone]   [phone]     Additional details provided by patient:     Does the patient have less than a 3 day supply:  [x] Yes  [] No    PLEASE  ADVISE     John CAIN Rep   08/10/22 08:43 EDT

## 2022-08-10 NOTE — TELEPHONE ENCOUNTER
Rx Refill Note  Requested Prescriptions     Pending Prescriptions Disp Refills   • pregabalin (Lyrica) 200 MG capsule 180 capsule 1     Sig: Take 1 capsule by mouth 2 (Two) Times a Day.      Last filled: 05/20/2022 180 with 1 refill.  Last office visit with prescribing clinician: 5/20/2022      Next office visit with prescribing clinician: 11/22/2022     Livia Winkler MA  08/10/22, 08:48 EDT

## 2022-11-22 ENCOUNTER — OFFICE VISIT (OUTPATIENT)
Dept: NEUROLOGY | Facility: CLINIC | Age: 57
End: 2022-11-22

## 2022-11-22 ENCOUNTER — LAB (OUTPATIENT)
Dept: LAB | Facility: HOSPITAL | Age: 57
End: 2022-11-22

## 2022-11-22 VITALS
TEMPERATURE: 97.3 F | WEIGHT: 157.6 LBS | DIASTOLIC BLOOD PRESSURE: 66 MMHG | HEIGHT: 64 IN | HEART RATE: 67 BPM | OXYGEN SATURATION: 98 % | SYSTOLIC BLOOD PRESSURE: 110 MMHG | BODY MASS INDEX: 26.91 KG/M2

## 2022-11-22 DIAGNOSIS — R00.2 HEART PALPITATIONS: ICD-10-CM

## 2022-11-22 DIAGNOSIS — D72.819 LEUKOPENIA, UNSPECIFIED TYPE: ICD-10-CM

## 2022-11-22 DIAGNOSIS — G35 MULTIPLE SCLEROSIS: Primary | ICD-10-CM

## 2022-11-22 DIAGNOSIS — R42 DIZZINESS: ICD-10-CM

## 2022-11-22 DIAGNOSIS — Z79.899 HIGH RISK MEDICATION USE: ICD-10-CM

## 2022-11-22 DIAGNOSIS — Z79.899 CONTROLLED SUBSTANCE AGREEMENT SIGNED: ICD-10-CM

## 2022-11-22 DIAGNOSIS — M79.2 NEUROPATHIC PAIN: Chronic | ICD-10-CM

## 2022-11-22 DIAGNOSIS — R13.10 DYSPHAGIA, UNSPECIFIED TYPE: ICD-10-CM

## 2022-11-22 LAB
AMPHET+METHAMPHET UR QL: NEGATIVE
AMPHETAMINES UR QL: NEGATIVE
BARBITURATES UR QL SCN: NEGATIVE
BASOPHILS # BLD AUTO: 0.04 10*3/MM3 (ref 0–0.2)
BASOPHILS NFR BLD AUTO: 1.8 % (ref 0–1.5)
BENZODIAZ UR QL SCN: NEGATIVE
BUPRENORPHINE SERPL-MCNC: NEGATIVE NG/ML
CANNABINOIDS SERPL QL: NEGATIVE
COCAINE UR QL: NEGATIVE
DEPRECATED RDW RBC AUTO: 44.8 FL (ref 37–54)
EOSINOPHIL # BLD AUTO: 0.07 10*3/MM3 (ref 0–0.4)
EOSINOPHIL NFR BLD AUTO: 3.1 % (ref 0.3–6.2)
ERYTHROCYTE [DISTWIDTH] IN BLOOD BY AUTOMATED COUNT: 12.6 % (ref 12.3–15.4)
HCT VFR BLD AUTO: 35.5 % (ref 34–46.6)
HGB BLD-MCNC: 12 G/DL (ref 12–15.9)
IMM GRANULOCYTES # BLD AUTO: 0.01 10*3/MM3 (ref 0–0.05)
IMM GRANULOCYTES NFR BLD AUTO: 0.4 % (ref 0–0.5)
LYMPHOCYTES # BLD AUTO: 0.36 10*3/MM3 (ref 0.7–3.1)
LYMPHOCYTES NFR BLD AUTO: 15.9 % (ref 19.6–45.3)
MCH RBC QN AUTO: 33.1 PG (ref 26.6–33)
MCHC RBC AUTO-ENTMCNC: 33.8 G/DL (ref 31.5–35.7)
MCV RBC AUTO: 97.8 FL (ref 79–97)
METHADONE UR QL SCN: NEGATIVE
MONOCYTES # BLD AUTO: 0.36 10*3/MM3 (ref 0.1–0.9)
MONOCYTES NFR BLD AUTO: 15.9 % (ref 5–12)
NEUTROPHILS NFR BLD AUTO: 1.43 10*3/MM3 (ref 1.7–7)
NEUTROPHILS NFR BLD AUTO: 62.9 % (ref 42.7–76)
NRBC BLD AUTO-RTO: 0 /100 WBC (ref 0–0.2)
OPIATES UR QL: NEGATIVE
OXYCODONE UR QL SCN: NEGATIVE
PCP UR QL SCN: NEGATIVE
PLATELET # BLD AUTO: 176 10*3/MM3 (ref 140–450)
PMV BLD AUTO: 12.5 FL (ref 6–12)
PROPOXYPH UR QL: NEGATIVE
RBC # BLD AUTO: 3.63 10*6/MM3 (ref 3.77–5.28)
TRICYCLICS UR QL SCN: NEGATIVE
WBC NRBC COR # BLD: 2.27 10*3/MM3 (ref 3.4–10.8)

## 2022-11-22 PROCEDURE — 99214 OFFICE O/P EST MOD 30 MIN: CPT | Performed by: NURSE PRACTITIONER

## 2022-11-22 PROCEDURE — 82607 VITAMIN B-12: CPT

## 2022-11-22 PROCEDURE — 85025 COMPLETE CBC W/AUTO DIFF WBC: CPT

## 2022-11-22 PROCEDURE — 82746 ASSAY OF FOLIC ACID SERUM: CPT

## 2022-11-22 PROCEDURE — 83735 ASSAY OF MAGNESIUM: CPT

## 2022-11-22 PROCEDURE — 80053 COMPREHEN METABOLIC PANEL: CPT

## 2022-11-22 PROCEDURE — 84436 ASSAY OF TOTAL THYROXINE: CPT

## 2022-11-22 PROCEDURE — 80306 DRUG TEST PRSMV INSTRMNT: CPT

## 2022-11-22 PROCEDURE — 36415 COLL VENOUS BLD VENIPUNCTURE: CPT

## 2022-11-22 PROCEDURE — 84443 ASSAY THYROID STIM HORMONE: CPT

## 2022-11-22 RX ORDER — GABAPENTIN 300 MG/1
300 CAPSULE ORAL 2 TIMES DAILY PRN
Qty: 180 CAPSULE | Refills: 1 | Status: SHIPPED | OUTPATIENT
Start: 2022-11-22 | End: 2023-02-24 | Stop reason: SDUPTHER

## 2022-11-22 NOTE — PROGRESS NOTES
Neuro Office Visit      Encounter Date: 2022   Patient Name: Mckenna Castellano  : 1965   MRN: [id number]     Chief Complaint:    Chief Complaint   Patient presents with   • Multiple Sclerosis       History of Present Illness: Mcknena Castellano is a 57 y.o. female who is here today in Neurology for RRMS, ocular migraine, depression, neuropathic pain, dysphagia    Last visit 22 w me-cont Gilenya, labs, refer to SLT, provigil, lyrica, wellbutrin,    Labs: CMP-stable. CBC w low WBC/RBC abs lymph 0.30      RRMS  Taking Gilenya  Vision changes: no diplopia or loss of vision  Slurred speech: occ slurred speech  Dysphagia: coughing w food, water and pills. Globus sensation, abd distenetion  Bladder: no urgency  Bowel: constipation is controlled  Skin: left index finger nail bed ridge x 2 years  Paresthesia: Right hand to shoulder and feet at night  Weakness: left side weakness and decreased tone. Right hand long and ring finger are stiff.  Fatigue: severe on Provigil  heat sensitive  Pain: controlled on Lyrica  Moods: stable on Wellbutrin  Gait: steady  Falls: fell up the stairs while carrying a ladder.     PH  Dx  by Dr Fraser with numbness in both legs. Treated w interferon x 2 months caused N/V. Copaxone for 4-5 years, then Gilenya. Moved to [facility] due to low counts and devloped new left medullary lesion with leg weakness. Stopped Aubagio due to ins issues.    Neuropathic pain  Feet and hands w bilat N/T. Worse at night in feet.  Lives in [place] and uses THC which is legal there  Taking Lyrica 200mg q hs. Compliant w meds.    Syncopal episode/Dizziness  Months ago had a syncopal episode at her PCP. Had LOC for few seconds. Complains of int of varying degrees of severity. Labs and VSS were stable.  Having daily dizziness.  Has chest and palpitations prior the dizziness    MDD  She is a  and can become overwhelmed. Uses deep breathing techniques. Remodeling her home in [place]    Ocluar  Migraine  Flashing bright lights in vision. Non-painful blurred vision.  Last episode  Subjective      Past Medical History:   Past Medical History:   Diagnosis Date   • Anxiety    • Depression    • Hyperlipidemia    • Migraine    • MS (multiple sclerosis) (HCC)        Past Surgical History:   Past Surgical History:   Procedure Laterality Date   • AUGMENTATION MAMMAPLASTY Bilateral 2012   • HYSTERECTOMY N/A    • TONSILLECTOMY         Family History:   Family History   Adopted: Yes   Problem Relation Age of Onset   • Breast cancer Neg Hx    • Ovarian cancer Neg Hx        Social History:   Social History     Socioeconomic History   • Marital status:    Tobacco Use   • Smoking status: Never   • Smokeless tobacco: Never   Vaping Use   • Vaping Use: Never used   Substance and Sexual Activity   • Alcohol use: No   • Drug use: No   • Sexual activity: Defer       Medications:     Current Outpatient Medications:   •  buPROPion XL (Wellbutrin XL) 300 MG 24 hr tablet, Take 1 tablet by mouth Every Morning., Disp: 90 tablet, Rfl: 5  •  fingolimod (Gilenya) 0.5 MG capsule, Take 1 capsule by mouth Daily., Disp: 30 capsule, Rfl: 11  •  modafinil (PROVIGIL) 200 MG tablet, Take 1 tablet by mouth Daily As Needed (fatigue)., Disp: 90 tablet, Rfl: 2  •  ondansetron (Zofran) 4 MG tablet, Take 1 tablet by mouth Every 12 (Twelve) Hours As Needed for Nausea or Vomiting., Disp: 60 tablet, Rfl: 5  •  rosuvastatin (CRESTOR) 10 MG tablet, Take 10 mg by mouth Every Night., Disp: , Rfl: 0  •  gabapentin (NEURONTIN) 300 MG capsule, Take 1 capsule by mouth 2 (Two) Times a Day As Needed (pain and numbness)., Disp: 180 capsule, Rfl: 1    Allergies:   Allergies   Allergen Reactions   • Ciprofloxacin Hives   • Zyrtec [Cetirizine] Unknown - Low Severity     Patient does not prefer to take; can tolerate allegra, claritin and benadryl       PHQ-9 Total Score:     STEADI Fall Risk Assessment has not been completed.    Objective     Physical Exam:    Physical Exam  Eyes:      Pupils: Pupils are equal, round, and reactive to light.   Neurological:      Mental Status: She is oriented to person, place, and time.      Coordination: Romberg Test normal.      Gait: Gait is intact. Tandem walk normal.      Deep Tendon Reflexes:      Reflex Scores:       Tricep reflexes are 2+ on the right side and 2+ on the left side.       Bicep reflexes are 2+ on the right side and 2+ on the left side.       Brachioradialis reflexes are 2+ on the right side and 2+ on the left side.       Patellar reflexes are 2+ on the right side and 2+ on the left side.       Achilles reflexes are 2+ on the right side and 2+ on the left side.  Psychiatric:         Speech: Speech normal.         Neurologic Exam     Mental Status   Oriented to person, place, and time.   Follows 3 step commands.   Attention: normal. Concentration: normal.   Speech: speech is normal   Level of consciousness: alert  Knowledge: consistent with education.   Normal comprehension.     Cranial Nerves     CN III, IV, VI   Pupils are equal, round, and reactive to light.  Right pupil: Accommodation: intact.   Left pupil: Accommodation: intact.   CN III: no CN III palsy  CN VI: no CN VI palsy  Nystagmus: none   Diplopia: none  Upgaze: normal  Downgaze: normal  Conjugate gaze: present    CN VII   Facial expression full, symmetric.     CN VIII   Hearing: intact    CN XII   CN XII normal.     Motor Exam   Muscle bulk: normal  Overall muscle tone: normal    Strength   Right biceps: 5/5  Left biceps: 5/5  Right triceps: 5/5  Left triceps: 5/5  Right interossei: 5/5  Left interossei: 5/5  Right quadriceps: 5/5  Left quadriceps: 5/5  Right anterior tibial: 5/5  Left anterior tibial: 5/5  Right posterior tibial: 5/5  Left posterior tibial: 5/5    Sensory Exam   Right arm light touch: normal  Left arm light touch: normal  Right leg light touch: normal  Left leg light touch: decreased from toes    Gait, Coordination, and Reflexes  "    Gait  Gait: normal    Coordination   Romberg: negative  Tandem walking coordination: normal    Tremor   Resting tremor: absent  Action tremor: absent    Reflexes   Right brachioradialis: 2+  Left brachioradialis: 2+  Right biceps: 2+  Left biceps: 2+  Right triceps: 2+  Left triceps: 2+  Right patellar: 2+  Left patellar: 2+  Right achilles: 2+  Left achilles: 2+  Right : 2+  Left : 2+       Vital Signs:   Vitals:    11/22/22 1309   BP: 110/66   Pulse: 67   Temp: 97.3 °F (36.3 °C)   SpO2: 98%   Weight: 71.5 kg (157 lb 9.6 oz)   Height: 162.6 cm (64.02\")     Body mass index is 27.04 kg/m².         Assessment / Plan      Assessment/Plan:   Diagnoses and all orders for this visit:    1. Multiple sclerosis (HCC) (Primary)  Comments:  Cont Gilenya, check labs  Orders:  -     MRI Brain With & Without Contrast; Future  -     MRI Cervical Spine With & Without Contrast; Future    2. Dizziness  -     CBC Auto Differential; Future  -     Comprehensive Metabolic Panel; Future  -     TSH; Future  -     T4; Future  -     Vitamin B12 & Folate; Future  -     Magnesium; Future  -     Ambulatory Referral to Cardiology    3. Controlled substance agreement signed  -     gabapentin (NEURONTIN) 300 MG capsule; Take 1 capsule by mouth 2 (Two) Times a Day As Needed (pain and numbness).  Dispense: 180 capsule; Refill: 1    4. Dysphagia, unspecified type  Comments:  Pt to call with name and numbner of rehab facility in [place]. Will send referral.    5. High risk medication use  -     Urine Drug Screen - Urine, Clean Catch; Future  -     gabapentin (NEURONTIN) 300 MG capsule; Take 1 capsule by mouth 2 (Two) Times a Day As Needed (pain and numbness).  Dispense: 180 capsule; Refill: 1    6. Neuropathic pain  Comments:  Stop Lyrica, start GBP  Orders:  -     gabapentin (NEURONTIN) 300 MG capsule; Take 1 capsule by mouth 2 (Two) Times a Day As Needed (pain and numbness).  Dispense: 180 capsule; Refill: 1    7. Heart palpitations  -  "    Ambulatory Referral to Cardiology    8. Leukopenia, unspecified type  Comments:  Check cbc           Patient Education:    As a part of this patient's therapy a controlled substance was prescribed. Instructed on the safe and proper use of this medication along with potential risks. Controlled substance contract signed and scanned. Uli will be reviewed and UDS obtained as indicated.      Reviewed medications, potential side effects and signs and symptoms to report. Discussed risk versus benefits of treatment plan with patient and/or family-including medications, labs and radiology that may be ordered. Addressed questions and concerns during visit. Patient and/or family verbalized understanding and agree with plan. Instructed to call the office with any questions and report to ER with any life-threatening symptoms.     Follow Up:   Return in about 3 months (around 2/22/2023), or w Dr Stover.    During this visit the following were done:  Labs Reviewed [x]    Labs Ordered [x]    Radiology Reports Reviewed [x]    Radiology Ordered []    PCP Records Reviewed []    Referring Provider Records Reviewed []    ER Records Reviewed []    Hospital Records Reviewed []    History Obtained From Family []    Radiology Images Reviewed []    Other Reviewed [x]    Records Requested []      Clif Mills, DNP, APRN

## 2022-11-23 LAB
ALBUMIN SERPL-MCNC: 4.6 G/DL (ref 3.5–5.2)
ALBUMIN/GLOB SERPL: 1.9 G/DL
ALP SERPL-CCNC: 91 U/L (ref 39–117)
ALT SERPL W P-5'-P-CCNC: 29 U/L (ref 1–33)
ANION GAP SERPL CALCULATED.3IONS-SCNC: 9.9 MMOL/L (ref 5–15)
AST SERPL-CCNC: 35 U/L (ref 1–32)
BILIRUB SERPL-MCNC: 0.3 MG/DL (ref 0–1.2)
BUN SERPL-MCNC: 14 MG/DL (ref 6–20)
BUN/CREAT SERPL: 15.4 (ref 7–25)
CALCIUM SPEC-SCNC: 9.4 MG/DL (ref 8.6–10.5)
CHLORIDE SERPL-SCNC: 105 MMOL/L (ref 98–107)
CO2 SERPL-SCNC: 28.1 MMOL/L (ref 22–29)
CREAT SERPL-MCNC: 0.91 MG/DL (ref 0.57–1)
EGFRCR SERPLBLD CKD-EPI 2021: 73.3 ML/MIN/1.73
FOLATE SERPL-MCNC: 12.5 NG/ML (ref 4.78–24.2)
GLOBULIN UR ELPH-MCNC: 2.4 GM/DL
GLUCOSE SERPL-MCNC: 97 MG/DL (ref 65–99)
MAGNESIUM SERPL-MCNC: 2.3 MG/DL (ref 1.6–2.6)
POTASSIUM SERPL-SCNC: 5 MMOL/L (ref 3.5–5.2)
PROT SERPL-MCNC: 7 G/DL (ref 6–8.5)
SODIUM SERPL-SCNC: 143 MMOL/L (ref 136–145)
T4 SERPL-MCNC: 5.92 MCG/DL (ref 4.5–11.7)
TSH SERPL DL<=0.05 MIU/L-ACNC: 1.2 UIU/ML (ref 0.27–4.2)
VIT B12 BLD-MCNC: 405 PG/ML (ref 211–946)

## 2022-11-29 ENCOUNTER — TELEPHONE (OUTPATIENT)
Dept: NEUROLOGY | Facility: CLINIC | Age: 57
End: 2022-11-29

## 2022-11-29 ENCOUNTER — SPECIALTY PHARMACY (OUTPATIENT)
Dept: ONCOLOGY | Facility: HOSPITAL | Age: 57
End: 2022-11-29

## 2022-11-29 RX ORDER — FINGOLIMOD HCL 0.5 MG/1
0.5 CAPSULE ORAL DAILY
Qty: 30 CAPSULE | Refills: 11 | Status: SHIPPED | OUTPATIENT
Start: 2022-11-29

## 2022-11-29 NOTE — TELEPHONE ENCOUNTER
Caller: FELISA Cornelius call back number: [phone]    What orders are you requesting (i.e. lab or imaging): MRI     In what timeframe would the patient need to come in: ASAP     Where will you receive your lab/imaging services[facility]     Additional notes: FAX ORDERS TO    FAX # [phone]      PT ALSO TAKES GILENYA AND HER INS WANTS HER TO TAKE GENERIC. SHE WANTS TO STAY ON ACTUAL DRUG. SHE BELIEVES HER INS WILL HAVE TO HAVE NEW SCRIPT SAYING FILL AS WRITTEN?     PLEASE ADVISE.

## 2022-11-29 NOTE — TELEPHONE ENCOUNTER
----- Message from Clif Mills, ERMELINDA, APRN sent at 11/29/2022  1:56 PM EST -----  Please let pt know her labs are stable. Her white blood cell count is low, but stable for her. I discussed it with Dr Stover and he will review it at her next appointment. He may suggest switching her therapies if she develops multiple infections.

## 2022-11-29 NOTE — TELEPHONE ENCOUNTER
Notified patient about Gilenya Rx[facility] lab [facility] Patient verbalized understanding.

## 2022-12-20 NOTE — PROGRESS NOTES
"[facility]  Heart and Valve Center    Chief Complaint  Establish Care, Dizziness, orthstatic dizziness, and Loss of Consciousness    Subjective    History of Present Illness {CC  Problem List  Visit  Diagnosis   Encounters  Notes  Medications  Labs  Result Review Imaging  Media :23}     Mckenna Castellano is a 57 y.o. female with multiple sclerosis, hyperlipidemia, depression who presents today for evaluation of palpitations at the request of Clif HAN.    She reports worsening dizziness while she was on Lyrica. She reports that she even passed out walking into her doctors office. She was changed to neurontin and dizziness has improved. Reports that she always has had some orthostatic hypotension, especially after she eats. She notes occasional feelings of fast heart rates, but this is infrequent and seem unrelated to dizziness. She had no associated palpitations when she passed out. Dizziness is now mild. Has episodes of orthostatic dizziness but also has sudden dizziness just sitting. Feels more like the room spins rather than near syncope. She notes some shortness of breath associated with anxiety. She reports some chest tightness and \"spasm\" like feeling that is infrequent and has happened her whole life, occurs only with deep breathing. Denies any exertional chest pain. Notes mild DE ANDA       Cardiac risks: age, hyperlipidemia    Objective     Vital Signs:   Vitals:    12/21/22 1327 12/21/22 1329 12/21/22 1330   BP: 121/70 133/75 133/74   BP Location: Right arm Left arm Left arm   Patient Position: Sitting Sitting Standing   Cuff Size: Adult Adult Adult   Pulse: 68 68 68   Resp: 17     Temp: 96.5 °F (35.8 °C)     TempSrc: Temporal     SpO2: 98%     Weight: 69.6 kg (153 lb 6.4 oz)     Height: 162.6 cm (64.02\")       Body mass index is 26.31 kg/m².  Physical Exam  Vitals reviewed.   Constitutional:       Appearance: Normal appearance.   HENT:      Head: Normocephalic.   Neck:     "  Vascular: No carotid bruit.   Cardiovascular:      Rate and Rhythm: Normal rate and regular rhythm.      Pulses: Normal pulses.      Heart sounds: Normal heart sounds, S1 normal and S2 normal. No murmur heard.  Pulmonary:      Effort: Pulmonary effort is normal. No respiratory distress.      Breath sounds: Normal breath sounds.   Chest:      Chest wall: No tenderness.   Abdominal:      General: Abdomen is flat.      Palpations: Abdomen is soft.   Musculoskeletal:      Cervical back: Neck supple.      Right lower leg: No edema.      Left lower leg: No edema.   Skin:     General: Skin is warm and dry.   Neurological:      General: No focal deficit present.      Mental Status: She is alert and oriented to person, place, and time. Mental status is at baseline.   Psychiatric:         Mood and Affect: Mood normal.         Behavior: Behavior normal.         Thought Content: Thought content normal.              Result Review  Data Reviewed:{ Labs  Result Review  Imaging  Med Tab  Media :23}   Urine Drug Screen - Urine, Clean Catch (11/22/2022 13:54)  CBC Auto Differential (11/22/2022 13:54)  Comprehensive Metabolic Panel (11/22/2022 13:54)  TSH (11/22/2022 13:54)  T4 (11/22/2022 13:54)  Vitamin B12 & Folate (11/22/2022 13:54)  Magnesium (11/22/2022 13:54)  ECG 12 Lead (06/27/2018 14:51)  EKG today shows normal sinus rhythm, possible septal infarct (low voltage)  Consultant notes Clif HAN            Assessment and Plan {CC Problem List  Visit Diagnosis  ROS  Review (Popup)  Health Maintenance  Quality  BestPractice  Medications  SmartSets  SnapShot Encounters  Media :23}   1. Dizziness  -Symptoms exacerbated by Lyrica and since have improved.  She does have some symptoms of orthostatic lightheadedness but also has 7 episodes of room spinning at rest which could be vertigo.  We will do 2-week Holter monitor due to recent syncopal event and ongoing dizziness.  - Adult Transthoracic Echo Complete  W/ Cont if Necessary Per Protocol; Future  - Holter Monitor - 72 Hour Up To 15 Days; Future    2. Palpitations    -ECG 12 Lead; Future  - Adult Transthoracic Echo Complete W/ Cont if Necessary Per Protocol; Future  - Holter Monitor - 72 Hour Up To 15 Days; Future    3. Syncope and collapse  - One episode while on lyrica  - Adult Transthoracic Echo Complete W/ Cont if Necessary Per Protocol; Future  - Holter Monitor - 72 Hour Up To 15 Days; Future    4. DE ANDA (dyspnea on exertion)  - Echo        Follow Up {Instructions Charge Capture  Follow-up Communications :23}   Return in about 6 weeks (around 2/1/2023) for Monitor results, Video Visit.    Patient was given instructions and counseling regarding her condition or for health maintenance advice. Please see specific information pulled into the AVS if appropriate.  Advised to call the Heart and Valve Center with any questions, concerns, or worsening symptoms.

## 2022-12-21 ENCOUNTER — OFFICE VISIT (OUTPATIENT)
Dept: CARDIOLOGY | Facility: HOSPITAL | Age: 57
End: 2022-12-21

## 2022-12-21 ENCOUNTER — HOSPITAL ENCOUNTER (OUTPATIENT)
Dept: CARDIOLOGY | Facility: HOSPITAL | Age: 57
Discharge: HOME | End: 2022-12-21

## 2022-12-21 VITALS
TEMPERATURE: 96.5 F | DIASTOLIC BLOOD PRESSURE: 74 MMHG | BODY MASS INDEX: 26.19 KG/M2 | SYSTOLIC BLOOD PRESSURE: 133 MMHG | RESPIRATION RATE: 17 BRPM | HEART RATE: 68 BPM | OXYGEN SATURATION: 98 % | WEIGHT: 153.4 LBS | HEIGHT: 64 IN

## 2022-12-21 DIAGNOSIS — R00.2 PALPITATIONS: ICD-10-CM

## 2022-12-21 DIAGNOSIS — R55 SYNCOPE AND COLLAPSE: ICD-10-CM

## 2022-12-21 DIAGNOSIS — R06.09 DOE (DYSPNEA ON EXERTION): ICD-10-CM

## 2022-12-21 DIAGNOSIS — R42 DIZZINESS: ICD-10-CM

## 2022-12-21 DIAGNOSIS — R42 DIZZINESS: Primary | ICD-10-CM

## 2022-12-21 PROCEDURE — 93010 ELECTROCARDIOGRAM REPORT: CPT | Performed by: INTERNAL MEDICINE

## 2022-12-21 PROCEDURE — 93246 EXT ECG>7D<15D RECORDING: CPT

## 2022-12-21 PROCEDURE — 99214 OFFICE O/P EST MOD 30 MIN: CPT | Performed by: NURSE PRACTITIONER

## 2022-12-21 PROCEDURE — 93005 ELECTROCARDIOGRAM TRACING: CPT | Performed by: NURSE PRACTITIONER

## 2022-12-21 NOTE — PROGRESS NOTES
[facility] Heart Monitor Documentation    Mckenna Castellano  1965  [id number]  12/21/22      [] ZIO XT Patch  Model [id number] Prescribed for N/A Days    · Serial Number: (N + 9 Digits) N   · Apply-By Date on Box:   · USPS Tracking Number:   · USPS Tracking        [] [employer]ian MINI PLUS Mobile Cardiac Telemetry  Model BGMINIPLUS Prescribed for N/A Days    · Serial Number: (BGM + 7 Digits) BGM  · Shipped-By Date on Box:   · UPS Tracking Number: [id number]  · UPS Tracking      [] [employer]ian MINI Holter Monitor  Model BGMINIEL Prescribed for 14 Days    · Serial Number: (7 Digits) [id number]  · Shipped-By Date on Box: 12/20/22  · UPS Tracking Number: [id number]  · UPS Tracking        This monitor was applied to the patient's chest and checked for proper functioning.  Ms. Mckenna Castellano was instructed in the proper use of this monitor.  She was given the opportunity to ask questions and left the office with the device 's instruction manual.    Jose David Marr MA, 14:12 EST, 12/21/22                  [facility] 8.8.[facility]

## 2023-01-19 LAB
QT INTERVAL: 414 MS
QTC INTERVAL: 430 MS

## 2023-01-26 ENCOUNTER — HOSPITAL ENCOUNTER (OUTPATIENT)
Dept: CARDIOLOGY | Facility: HOSPITAL | Age: 58
Discharge: HOME | End: 2023-01-26
Admitting: NURSE PRACTITIONER
Payer: COMMERCIAL

## 2023-01-26 VITALS — HEIGHT: 64 IN | WEIGHT: 153 LBS | BODY MASS INDEX: 26.12 KG/M2

## 2023-01-26 DIAGNOSIS — R00.2 PALPITATIONS: ICD-10-CM

## 2023-01-26 DIAGNOSIS — R55 SYNCOPE AND COLLAPSE: ICD-10-CM

## 2023-01-26 DIAGNOSIS — R42 DIZZINESS: ICD-10-CM

## 2023-01-26 LAB
ASCENDING AORTA: 3 CM
AV VENA CONTRACTA: 0.48 CM
BH CV ECHO MEAS - AI P1/2T: 776.2 MSEC
BH CV ECHO MEAS - AO MAX PG: 6 MMHG
BH CV ECHO MEAS - AO MEAN PG: 3 MMHG
BH CV ECHO MEAS - AO ROOT DIAM: 3.4 CM
BH CV ECHO MEAS - AO V2 MAX: 122 CM/SEC
BH CV ECHO MEAS - AO V2 VTI: 29 CM
BH CV ECHO MEAS - AVA(I,D): 1.83 CM2
BH CV ECHO MEAS - EDV(CUBED): 76.8 ML
BH CV ECHO MEAS - EDV(MOD-SP2): 33 ML
BH CV ECHO MEAS - EDV(MOD-SP4): 41 ML
BH CV ECHO MEAS - EF(MOD-BP): 64 %
BH CV ECHO MEAS - EF(MOD-SP2): 60.6 %
BH CV ECHO MEAS - EF(MOD-SP4): 65.9 %
BH CV ECHO MEAS - ESV(CUBED): 17.6 ML
BH CV ECHO MEAS - ESV(MOD-SP2): 13 ML
BH CV ECHO MEAS - ESV(MOD-SP4): 14 ML
BH CV ECHO MEAS - FS: 38.8 %
BH CV ECHO MEAS - IVS/LVPW: 0.89 CM
BH CV ECHO MEAS - IVSD: 0.79 CM
BH CV ECHO MEAS - LA DIMENSION: 2.7 CM
BH CV ECHO MEAS - LAT PEAK E' VEL: 13.4 CM/SEC
BH CV ECHO MEAS - LV DIASTOLIC VOL/BSA (35-75): 23.5 CM2
BH CV ECHO MEAS - LV MASS(C)D: 109.1 GRAMS
BH CV ECHO MEAS - LV MAX PG: 2.08 MMHG
BH CV ECHO MEAS - LV MEAN PG: 1 MMHG
BH CV ECHO MEAS - LV SYSTOLIC VOL/BSA (12-30): 8 CM2
BH CV ECHO MEAS - LV V1 MAX: 72.1 CM/SEC
BH CV ECHO MEAS - LV V1 VTI: 16.9 CM
BH CV ECHO MEAS - LVIDD: 4.3 CM
BH CV ECHO MEAS - LVIDS: 2.6 CM
BH CV ECHO MEAS - LVOT AREA: 3.1 CM2
BH CV ECHO MEAS - LVOT DIAM: 2 CM
BH CV ECHO MEAS - LVPWD: 0.88 CM
BH CV ECHO MEAS - MED PEAK E' VEL: 8.33 CM/SEC
BH CV ECHO MEAS - MV A MAX VEL: 50.3 CM/SEC
BH CV ECHO MEAS - MV DEC SLOPE: 190 CM/SEC2
BH CV ECHO MEAS - MV DEC TIME: 0.26 MSEC
BH CV ECHO MEAS - MV E MAX VEL: 71.6 CM/SEC
BH CV ECHO MEAS - MV E/A: 1.42
BH CV ECHO MEAS - MV MAX PG: 1.9 MMHG
BH CV ECHO MEAS - MV MEAN PG: 1 MMHG
BH CV ECHO MEAS - MV P1/2T: 108.8 MSEC
BH CV ECHO MEAS - MV V2 VTI: 21 CM
BH CV ECHO MEAS - MVA(P1/2T): 2.02 CM2
BH CV ECHO MEAS - MVA(VTI): 2.5 CM2
BH CV ECHO MEAS - PA ACC SLOPE: 161 CM/SEC2
BH CV ECHO MEAS - PA ACC TIME: 0.21 SEC
BH CV ECHO MEAS - PA PR(ACCEL): -16 MMHG
BH CV ECHO MEAS - PA V2 MAX: 76.6 CM/SEC
BH CV ECHO MEAS - PI END-D VEL: 110 CM/SEC
BH CV ECHO MEAS - RAP SYSTOLE: 3 MMHG
BH CV ECHO MEAS - RVSP: 23.1 MMHG
BH CV ECHO MEAS - SI(MOD-SP2): 11.5 ML/M2
BH CV ECHO MEAS - SI(MOD-SP4): 15.5 ML/M2
BH CV ECHO MEAS - SV(LVOT): 53.1 ML
BH CV ECHO MEAS - SV(MOD-SP2): 20 ML
BH CV ECHO MEAS - SV(MOD-SP4): 27 ML
BH CV ECHO MEAS - TAPSE (>1.6): 1.7 CM
BH CV ECHO MEAS - TR MAX PG: 20.1 MMHG
BH CV ECHO MEAS - TR MAX VEL: 224 CM/SEC
BH CV ECHO MEASUREMENTS AVERAGE E/E' RATIO: 6.59
BH CV VAS BP LEFT ARM: NORMAL MMHG
BH CV XLRA - RV BASE: 3.2 CM
BH CV XLRA - RV LENGTH: 6.6 CM
BH CV XLRA - RV MID: 2.8 CM
BH CV XLRA - TDI S': 9.87 CM/SEC
IVRT: 119 MSEC
LEFT ATRIUM VOLUME INDEX: 29.1 ML/M2
LV EF 2D ECHO EST: 60 %
MAXIMAL PREDICTED HEART RATE: 161 BPM
MV VENA CONTRACTA: 0.35 CM
STRESS TARGET HR: 137 BPM

## 2023-01-26 PROCEDURE — 93306 TTE W/DOPPLER COMPLETE: CPT | Performed by: INTERNAL MEDICINE

## 2023-01-26 PROCEDURE — 93306 TTE W/DOPPLER COMPLETE: CPT

## 2023-01-26 NOTE — PROGRESS NOTES
Your echo shows that your heart is squeezing normally.  There is some mild valve insufficiency, but this is not concerning at this time and should not cause you any symptoms.  Please keep your follow-up with me to review the monitor.  Please let me know if you have any further questions or concerns

## 2023-01-30 ENCOUNTER — TELEPHONE (OUTPATIENT)
Dept: NEUROLOGY | Facility: CLINIC | Age: 58
End: 2023-01-30
Payer: COMMERCIAL

## 2023-01-30 NOTE — TELEPHONE ENCOUNTER
----- Message from Clif Mills, ERMELINDA, APRN sent at 1/30/2023  1:23 PM EST -----  Please notify pt MRI of brain and c-spine are stable with no new lesions per report.

## 2023-01-30 NOTE — PROGRESS NOTES
"[facility]  Heart and Valve Center      Mode of Visit: Video  Location of patient: home  You have chosen to receive care through a telehealth visit.  Does the patient consent to use a video/audio connection for your medical care today? Yes  The visit included audio and video interaction. No technical issues occurred during this visit.     Chief Complaint  Follow-up and Dizziness    History of Present Illness    Mckenna Castellano is a 58 y.o. female with multiple sclerosis, hyperlipidemia, depression who presents today as a telemedicine follow-up on syncope.    Patient had worsening orthostatic hypotension while on Lyrica.  She had 1 syncopal event while she was at her doctor's office.  She was changed to Neurontin and her dizziness improved.  Echo was within normal limits except for some mild valvular insufficiency. She has some mild orthostatic dizziness but feels much better. Denies chest pain. Notes some DE ANDA, which is chronic and reports it improves when she exercises      Objective     Vital Signs:   Vitals:    01/31/23 1338   Weight: 68 kg (150 lb)   Height: 162.6 cm (64\")     Body mass index is 25.75 kg/m².    Virtual Visit Physical Exam  Physical Exam  Vitals reviewed.   Constitutional:       Appearance: Normal appearance.   HENT:      Head: Normocephalic.   Neck:      Vascular: No carotid bruit.   Cardiovascular:      Rate and Rhythm: Normal rate and regular rhythm.      Pulses: Normal pulses.      Heart sounds: Normal heart sounds, S1 normal and S2 normal. No murmur heard.  Pulmonary:      Effort: Pulmonary effort is normal. No respiratory distress.      Breath sounds: Normal breath sounds.   Chest:      Chest wall: No tenderness.   Abdominal:      General: Abdomen is flat.      Palpations: Abdomen is soft.   Musculoskeletal:      Cervical back: Neck supple.      Right lower leg: No edema.      Left lower leg: No edema.   Skin:     General: Skin is warm and dry.   Neurological:      General: " No focal deficit present.      Mental Status: She is alert and oriented to person, place, and time. Mental status is at baseline.   Psychiatric:         Mood and Affect: Mood normal.         Behavior: Behavior normal.         Thought Content: Thought content normal.              Result Review  Data Reviewed:{ Labs  Result Review  Imaging  Med Tab  Media :23}   Adult Transthoracic Echo Complete W/ Cont if Necessary Per Protocol (01/26/2023 10:55)    Extended holter monitor 12/2022 average heart rate 63, minimum heart rate 50, maximum heart rate 131 bpm.  Rare PACs and PVCs.  Negative for arrhythmias EEG           Assessment and Plan {CC Problem List  Visit Diagnosis  ROS  Review (Popup)  Health Maintenance  Quality  BestPractice  Medications  SmartSets  SnapShot Encounters  Media :23}   1. Syncope and collapse  - One episode in the setting of orthostatic hypotension, which has seen resolved. Extended holter negative for arrhythmias     2. Orthostatic lightheadedness  - Mostly resolved after stopping Lyrica  - Continue adequate exercise    3. Mild valvular heart disease  - Mild AI, mild TR, mild pulmonic insufficiency  - Consider repeat echo in 3-5 years  - Encouraged regular exercise and to monitor for worsening dyspnea      Follow Up {Instructions Charge Capture  Follow-up Communications :23}   No follow-ups on file.    Patient was given instructions and counseling regarding her condition or for health maintenance advice. Please see specific information pulled into the AVS if appropriate.  Advised to call the Heart and Valve Center with any questions, concerns, or worsening symptoms.    Dictated Utilizing Dragon Dictation

## 2023-01-31 ENCOUNTER — TELEMEDICINE (OUTPATIENT)
Dept: CARDIOLOGY | Facility: HOSPITAL | Age: 58
End: 2023-01-31
Payer: COMMERCIAL

## 2023-01-31 VITALS — BODY MASS INDEX: 25.61 KG/M2 | WEIGHT: 150 LBS | HEIGHT: 64 IN

## 2023-01-31 DIAGNOSIS — R42 ORTHOSTATIC LIGHTHEADEDNESS: ICD-10-CM

## 2023-01-31 DIAGNOSIS — R55 SYNCOPE AND COLLAPSE: Primary | ICD-10-CM

## 2023-01-31 DIAGNOSIS — I38 MILD VALVULAR HEART DISEASE: ICD-10-CM

## 2023-01-31 PROCEDURE — 99213 OFFICE O/P EST LOW 20 MIN: CPT | Performed by: NURSE PRACTITIONER

## 2023-02-06 PROCEDURE — 93248 EXT ECG>7D<15D REV&INTERPJ: CPT | Performed by: INTERNAL MEDICINE

## 2023-02-24 ENCOUNTER — OFFICE VISIT (OUTPATIENT)
Dept: NEUROLOGY | Facility: CLINIC | Age: 58
End: 2023-02-24
Payer: COMMERCIAL

## 2023-02-24 VITALS
SYSTOLIC BLOOD PRESSURE: 126 MMHG | BODY MASS INDEX: 25.61 KG/M2 | DIASTOLIC BLOOD PRESSURE: 72 MMHG | OXYGEN SATURATION: 97 % | WEIGHT: 150 LBS | HEART RATE: 67 BPM | HEIGHT: 64 IN

## 2023-02-24 DIAGNOSIS — F33.9 EPISODE OF RECURRENT MAJOR DEPRESSIVE DISORDER, UNSPECIFIED DEPRESSION EPISODE SEVERITY: Chronic | ICD-10-CM

## 2023-02-24 DIAGNOSIS — G35 MULTIPLE SCLEROSIS: Primary | Chronic | ICD-10-CM

## 2023-02-24 DIAGNOSIS — M79.2 NEUROPATHIC PAIN: Chronic | ICD-10-CM

## 2023-02-24 DIAGNOSIS — M79.2 NEUROPATHIC PAIN: ICD-10-CM

## 2023-02-24 DIAGNOSIS — Z79.899 CONTROLLED SUBSTANCE AGREEMENT SIGNED: ICD-10-CM

## 2023-02-24 DIAGNOSIS — R53.82 CHRONIC FATIGUE: ICD-10-CM

## 2023-02-24 DIAGNOSIS — Z79.899 HIGH RISK MEDICATION USE: ICD-10-CM

## 2023-02-24 DIAGNOSIS — G35 MULTIPLE SCLEROSIS: ICD-10-CM

## 2023-02-24 PROCEDURE — 99214 OFFICE O/P EST MOD 30 MIN: CPT | Performed by: PSYCHIATRY & NEUROLOGY

## 2023-02-24 RX ORDER — BUPROPION HYDROCHLORIDE 300 MG/1
300 TABLET ORAL EVERY MORNING
Qty: 90 TABLET | Refills: 5 | Status: SHIPPED | OUTPATIENT
Start: 2023-02-24 | End: 2024-02-24

## 2023-02-24 RX ORDER — MODAFINIL 200 MG/1
200 TABLET ORAL DAILY PRN
Qty: 90 TABLET | Refills: 2 | Status: SHIPPED | OUTPATIENT
Start: 2023-02-24

## 2023-02-24 RX ORDER — GABAPENTIN 300 MG/1
300 CAPSULE ORAL 2 TIMES DAILY PRN
Qty: 180 CAPSULE | Refills: 1 | Status: SHIPPED | OUTPATIENT
Start: 2023-02-24

## 2023-02-24 RX ORDER — BUPROPION HYDROCHLORIDE 150 MG/1
150 TABLET ORAL EVERY MORNING
Qty: 90 TABLET | Refills: 3 | Status: SHIPPED | OUTPATIENT
Start: 2023-02-24 | End: 2024-02-24

## 2023-02-24 NOTE — ASSESSMENT & PLAN NOTE
Patient's depression is recurrent and is mild without psychosis. Their depression is currently in partial remission and the condition is unchanged. This will be reassessed at the next regular appointment. F/U as described:patient will continue current medication therapy.    Increase Wellbutrin

## 2023-02-24 NOTE — PROGRESS NOTES
Chief Complaint    Multiple Sclerosis    Subjective        Mckenna Castellano presents to [facility] NEUROLOGY JULIA     History of Present Illness    58 y.o. female returns in follow up.  Last visit on 11/22/22  continued Gilenya, Lyrica,  Provigil, Wellbutrin, ordered MRI B/C, labs.        11/22/22 CBC, CMP - NCS     RRMS        MRI Brain/Cervical, 1/27/23 as compared to 12/23/21 few scattered T2 lesions, PVWM and medulla without new/enlarging/enhancing lesions, no cord lesion     Tolerating Gilenya without side effects.      Neuropathic pain       mg qhs.  Feet/hands develop B/N/T when under stress.      Fatigue      Provigil prn for fatigue helps with fatigue     Fatigue is moderate.  Heat intolerance is moderate.        MDD     Mood needs improvement  on Wellbutrin      Problem history:     Developed bilateral LE numbness.  Then progressed to left leg up to Face and arm.  Left sided increased sensitivity to touch.  Sx lasted for 3 months.  GBP used to improve pain.  No other relapses since first event.       Started on Aubagio in 10/2017.  Noted mild hair loss after starting.  Denies new or worsening sx.  Heat intolerance is moderate.  Fatigue is mild to moderate.  Balance is off and left leg is weaker.  ST memory decreasing.  Bladder frequency and urgency.       Feet have N/T at bedtime.   mg qhs wears off and does not last all night.         Reviewed previous medical records:     Dx with MS in 2005 by Dr Benitez Fraser.  Onset had numbness in both legs, then LE F/A/L N and allodynia.  Treated with interferon for 2 months, N/V.  Switched to Copaxone for 4 -5 years.  Then to Gilenya.  Tolerating Gilenya without noted dz activity.  Moved to Augi due to low counts.  During switch to Aubagio noted new left medullary lesion with left leg weakness.  Stopped Aubagio due to insurance issues.       MRI Brain - 10/2/17 new area of enhancement in right medulla, moderate T2 lesions     "     Objective   Vital Signs:  /72   Pulse 67   Ht 162.6 cm (64.02\")   Wt 68 kg (150 lb)   SpO2 97%   BMI 25.73 kg/m²   Estimated body mass index is 25.73 kg/m² as calculated from the following:    Height as of this encounter: 162.6 cm (64.02\").    Weight as of this encounter: 68 kg (150 lb).             Physical Exam  Eyes:      Extraocular Movements: EOM normal.      Pupils: Pupils are equal, round, and reactive to light.   Neurological:      Mental Status: She is oriented to person, place, and time.      Cranial Nerves: Cranial nerves 2-12 are intact.      Motor: Motor strength is normal.      Gait: Gait is intact.   Psychiatric:         Speech: Speech normal.          Neurologic Exam     Mental Status   Oriented to person, place, and time.   Speech: speech is normal   Level of consciousness: alert  Knowledge: good and consistent with education.   Normal comprehension.     Cranial Nerves   Cranial nerves II through XII intact.     CN II   Visual fields full to confrontation.   Visual acuity: normal  Right visual field deficit: none  Left visual field deficit: none     CN III, IV, VI   Pupils are equal, round, and reactive to light.  Extraocular motions are normal.   Nystagmus: none   Diplopia: none  Ophthalmoparesis: none  Upgaze: normal  Downgaze: normal  Conjugate gaze: present    CN V   Facial sensation intact.   Right corneal reflex: normal  Left corneal reflex: normal    CN VII   Right facial weakness: none  Left facial weakness: none    CN VIII   Hearing: intact    CN IX, X   Palate: symmetric  Right gag reflex: normal  Left gag reflex: normal    CN XI   Right sternocleidomastoid strength: normal  Left sternocleidomastoid strength: normal    CN XII   Tongue: not atrophic  Fasciculations: absent  Tongue deviation: none    Motor Exam   Muscle bulk: normal  Overall muscle tone: normal    Strength   Strength 5/5 throughout.     Sensory Exam   Light touch normal.     Gait, Coordination, and Reflexes "     Gait  Gait: normal    Tremor   Resting tremor: absent  Intention tremor: absent  Action tremor: absent    Reflexes   Reflexes 2+ except as noted.      Result Review :  The following data was reviewed by: Surinder Stover MD on 02/24/2023:  Common labs    Common Labs 5/20/22 5/20/22 11/22/22 11/22/22    1539 1539 1354 1354   Glucose  114 (A)  97   BUN  18  14   Creatinine  0.95  0.91   Sodium  142  143   Potassium  4.9  5.0   Chloride  104  105   Calcium  9.8  9.4   Albumin  4.80  4.60   Total Bilirubin  0.3  0.3   Alkaline Phosphatase  98  91   AST (SGOT)  38 (A)  35 (A)   ALT (SGPT)  31  29   WBC 2.31 (A)  2.27 (A)    Hemoglobin 12.4  12.0    Hematocrit 35.5  35.5    Platelets 187  176    (A) Abnormal value                         Assessment and Plan   Diagnoses and all orders for this visit:    1. Multiple sclerosis (HCC) (Primary)  Assessment & Plan:  Sx stable on NetBeez     MRI stable         Orders:  -     modafinil (PROVIGIL) 200 MG tablet; Take 1 tablet by mouth Daily As Needed (fatigue).  Dispense: 90 tablet; Refill: 2  -     Ambulatory Referral to Physical Therapy Evaluate and treat    2. Neuropathic pain  Assessment & Plan:  GBP     Orders:  -     modafinil (PROVIGIL) 200 MG tablet; Take 1 tablet by mouth Daily As Needed (fatigue).  Dispense: 90 tablet; Refill: 2  -     gabapentin (NEURONTIN) 300 MG capsule; Take 1 capsule by mouth 2 (Two) Times a Day As Needed (pain and numbness).  Dispense: 180 capsule; Refill: 1  -     Ambulatory Referral to Physical Therapy Evaluate and treat    3. Episode of recurrent major depressive disorder, unspecified depression episode severity (HCC)  Assessment & Plan:  Patient's depression is recurrent and is mild without psychosis. Their depression is currently in partial remission and the condition is unchanged. This will be reassessed at the next regular appointment. F/U as described:patient will continue current medication therapy.    Increase Wellbutrin       4.  Multiple sclerosis (HCC)  Comments:  Cont Gilenya.   Labs today  MRI next visit  Assessment & Plan:  Sx stable on Gilenya     MRI stable         Orders:  -     modafinil (PROVIGIL) 200 MG tablet; Take 1 tablet by mouth Daily As Needed (fatigue).  Dispense: 90 tablet; Refill: 2  -     Ambulatory Referral to Physical Therapy Evaluate and treat    5. Neuropathic pain  Comments:  Lyrica refilled  Assessment & Plan:  GBP     Orders:  -     modafinil (PROVIGIL) 200 MG tablet; Take 1 tablet by mouth Daily As Needed (fatigue).  Dispense: 90 tablet; Refill: 2  -     gabapentin (NEURONTIN) 300 MG capsule; Take 1 capsule by mouth 2 (Two) Times a Day As Needed (pain and numbness).  Dispense: 180 capsule; Refill: 1  -     Ambulatory Referral to Physical Therapy Evaluate and treat    6. Chronic fatigue  Comments:  Refill provigil  Orders:  -     modafinil (PROVIGIL) 200 MG tablet; Take 1 tablet by mouth Daily As Needed (fatigue).  Dispense: 90 tablet; Refill: 2    7. Controlled substance agreement signed  -     gabapentin (NEURONTIN) 300 MG capsule; Take 1 capsule by mouth 2 (Two) Times a Day As Needed (pain and numbness).  Dispense: 180 capsule; Refill: 1    8. High risk medication use  -     gabapentin (NEURONTIN) 300 MG capsule; Take 1 capsule by mouth 2 (Two) Times a Day As Needed (pain and numbness).  Dispense: 180 capsule; Refill: 1    9. Neuropathic pain  Comments:  Stop Lyrica, start GBP  Assessment & Plan:  GBP     Orders:  -     modafinil (PROVIGIL) 200 MG tablet; Take 1 tablet by mouth Daily As Needed (fatigue).  Dispense: 90 tablet; Refill: 2  -     gabapentin (NEURONTIN) 300 MG capsule; Take 1 capsule by mouth 2 (Two) Times a Day As Needed (pain and numbness).  Dispense: 180 capsule; Refill: 1  -     Ambulatory Referral to Physical Therapy Evaluate and treat    Other orders  -     buPROPion XL (Wellbutrin XL) 150 MG 24 hr tablet; Take 1 tablet by mouth Every Morning.  Dispense: 90 tablet; Refill: 3  -     buPROPion  XL (Wellbutrin XL) 300 MG 24 hr tablet; Take 1 tablet by mouth Every Morning.  Dispense: 90 tablet; Refill: 5           Follow Up   No follow-ups on file.  Patient was given instructions and counseling regarding her condition or for health maintenance advice. Please see specific information pulled into the AVS if appropriate.

## 2023-03-01 ENCOUNTER — TELEPHONE (OUTPATIENT)
Dept: NEUROLOGY | Facility: CLINIC | Age: 58
End: 2023-03-01

## 2023-03-01 ENCOUNTER — PRIOR AUTHORIZATION (OUTPATIENT)
Dept: NEUROLOGY | Facility: CLINIC | Age: 58
End: 2023-03-01
Payer: COMMERCIAL

## 2023-03-01 NOTE — TELEPHONE ENCOUNTER
Provider: MARTA JOHANSEN  Caller: MADELYN  Relationship to Patient: PHARMACY [facility]   Pharmacy: [facility]  Phone Number: [phone]  Reason for Call: MADELYN CALLED TO CONFIRM SHE FAXED OVER A PA TO OUR OFFICE FAX NUMBER FOR MEDICATION MODAFINIL. PLEASE REVIEW AND ADVISE     THANK YOU

## 2023-05-12 ENCOUNTER — TRANSCRIBE ORDERS (OUTPATIENT)
Dept: ADMINISTRATIVE | Facility: HOSPITAL | Age: 58
End: 2023-05-12
Payer: COMMERCIAL

## 2023-05-12 DIAGNOSIS — Z12.31 VISIT FOR SCREENING MAMMOGRAM: Primary | ICD-10-CM

## 2023-06-14 ENCOUNTER — TELEPHONE (OUTPATIENT)
Dept: NEUROLOGY | Facility: CLINIC | Age: 58
End: 2023-06-14

## 2023-06-14 NOTE — TELEPHONE ENCOUNTER
Provider: MAT  Caller: FELISA  Phone Number: [phone]  Reason for Call: PT CALLED AND STATES THAT [employer] IS NEEDING A PRIOR AUTH. PT STATES THAT THERE IS A GENERIC BUT SHE WOULD LIKE O STAY ON THE NAME BRAND. PT STATES THAT [employer] SAID TO PLEASE MARCELLE THE A CALL WHEN PRIOR AUTH IS APPROVED.  ACCERDO GAVE PT THIS NUMBER FOR PROVIDER TO CALL FOR PROVIDER TO CONTACT INSURANCE.  PHONE# [phone]    PT HAS 8 DAYS LEFT OF MEDICATION     PLEASE REVIEW AND ADVISE.  THANK YOU

## 2023-06-16 NOTE — TELEPHONE ENCOUNTER
"Caller: Mckenna Castellano \"Kat\"    Relationship: Self    Best call back number: [phone]    What was the call regarding: PT CALLING TO CHECK FOR ANY STATUS UPDATES REGARDING [employer] PRIOR AUTHORIZATION. PT STATES [employer] HAD CALLED HER TO REMIND HER AS WELL.    Do you require a callback: YES, PLEASE.    PLEASE REVIEW AND ADVISE.  "

## 2023-07-10 ENCOUNTER — TELEPHONE (OUTPATIENT)
Dept: NEUROLOGY | Facility: CLINIC | Age: 58
End: 2023-07-10

## 2023-07-10 NOTE — TELEPHONE ENCOUNTER
Caller: ALEXANDRIA  Relationship to Patient: SELF  Phone Number: [phone]    Reason for Call: PT WANTED TO LET JONATHAN BE AWARE HER INSURANCE CHANGED AS OF 7-1-23 AND SHE WANTED TO MAKE SURE HE WAS AWARE SO IF NEW AUTH'S NEED TO BE PLACED WITH HER NEW INSURANCE

## 2023-08-22 DIAGNOSIS — Z79.899 HIGH RISK MEDICATION USE: ICD-10-CM

## 2023-08-22 DIAGNOSIS — M79.2 NEUROPATHIC PAIN: Chronic | ICD-10-CM

## 2023-08-22 DIAGNOSIS — Z79.899 CONTROLLED SUBSTANCE AGREEMENT SIGNED: ICD-10-CM

## 2023-08-22 RX ORDER — GABAPENTIN 300 MG/1
300 CAPSULE ORAL 2 TIMES DAILY PRN
Qty: 180 CAPSULE | Refills: 1 | Status: SHIPPED | OUTPATIENT
Start: 2023-08-22 | End: 2023-08-25 | Stop reason: SDUPTHER

## 2023-08-22 NOTE — TELEPHONE ENCOUNTER
Rx Refill Note  Requested Prescriptions     Pending Prescriptions Disp Refills    gabapentin (NEURONTIN) 300 MG capsule [Pharmacy Med Name: GABAPENTIN 300 MG  Capsule] 180 capsule 1     Sig: TAKE 1 CAPSULE BY MOUTH 2 (TWO) TIMES A DAY AS NEEDED (PAIN AND NUMBNESS).      Last filled: 2/24/23 90 day with 1 pending to provider for approval  Last office visit with prescribing clinician: 2/24/2023      Next office visit with prescribing clinician: 8/25/2023     Linda Smith MA  08/22/23, 09:38 EDT

## 2023-08-25 ENCOUNTER — OFFICE VISIT (OUTPATIENT)
Dept: NEUROLOGY | Facility: CLINIC | Age: 58
End: 2023-08-25
Payer: COMMERCIAL

## 2023-08-25 ENCOUNTER — LAB (OUTPATIENT)
Dept: LAB | Facility: HOSPITAL | Age: 58
End: 2023-08-25
Payer: COMMERCIAL

## 2023-08-25 VITALS
DIASTOLIC BLOOD PRESSURE: 76 MMHG | BODY MASS INDEX: 23.39 KG/M2 | OXYGEN SATURATION: 98 % | WEIGHT: 137 LBS | SYSTOLIC BLOOD PRESSURE: 112 MMHG | HEART RATE: 68 BPM | HEIGHT: 64 IN

## 2023-08-25 DIAGNOSIS — R53.82 CHRONIC FATIGUE: ICD-10-CM

## 2023-08-25 DIAGNOSIS — Z79.899 CONTROLLED SUBSTANCE AGREEMENT SIGNED: ICD-10-CM

## 2023-08-25 DIAGNOSIS — G35 MULTIPLE SCLEROSIS: Primary | Chronic | ICD-10-CM

## 2023-08-25 DIAGNOSIS — M79.2 NEUROPATHIC PAIN: Chronic | ICD-10-CM

## 2023-08-25 DIAGNOSIS — G35 MULTIPLE SCLEROSIS: ICD-10-CM

## 2023-08-25 DIAGNOSIS — F33.9 EPISODE OF RECURRENT MAJOR DEPRESSIVE DISORDER, UNSPECIFIED DEPRESSION EPISODE SEVERITY: Chronic | ICD-10-CM

## 2023-08-25 DIAGNOSIS — Z79.899 HIGH RISK MEDICATION USE: ICD-10-CM

## 2023-08-25 DIAGNOSIS — M79.2 NEUROPATHIC PAIN: ICD-10-CM

## 2023-08-25 LAB
ALBUMIN SERPL-MCNC: 4.9 G/DL (ref 3.5–5.2)
ALBUMIN/GLOB SERPL: 2 G/DL
ALP SERPL-CCNC: 108 U/L (ref 39–117)
ALT SERPL W P-5'-P-CCNC: 46 U/L (ref 1–33)
ANION GAP SERPL CALCULATED.3IONS-SCNC: 11 MMOL/L (ref 5–15)
AST SERPL-CCNC: 36 U/L (ref 1–32)
BASOPHILS # BLD AUTO: 0.04 10*3/MM3 (ref 0–0.2)
BASOPHILS NFR BLD AUTO: 1.6 % (ref 0–1.5)
BILIRUB SERPL-MCNC: 0.3 MG/DL (ref 0–1.2)
BUN SERPL-MCNC: 13 MG/DL (ref 6–20)
BUN/CREAT SERPL: 13.1 (ref 7–25)
CALCIUM SPEC-SCNC: 10 MG/DL (ref 8.6–10.5)
CHLORIDE SERPL-SCNC: 102 MMOL/L (ref 98–107)
CO2 SERPL-SCNC: 29 MMOL/L (ref 22–29)
CREAT SERPL-MCNC: 0.99 MG/DL (ref 0.57–1)
DEPRECATED RDW RBC AUTO: 47.6 FL (ref 37–54)
EGFRCR SERPLBLD CKD-EPI 2021: 65.8 ML/MIN/1.73
EOSINOPHIL # BLD AUTO: 0.07 10*3/MM3 (ref 0–0.4)
EOSINOPHIL NFR BLD AUTO: 2.8 % (ref 0.3–6.2)
ERYTHROCYTE [DISTWIDTH] IN BLOOD BY AUTOMATED COUNT: 12.9 % (ref 12.3–15.4)
GLOBULIN UR ELPH-MCNC: 2.5 GM/DL
GLUCOSE SERPL-MCNC: 95 MG/DL (ref 65–99)
HCT VFR BLD AUTO: 39.5 % (ref 34–46.6)
HGB BLD-MCNC: 13 G/DL (ref 12–15.9)
IMM GRANULOCYTES # BLD AUTO: 0 10*3/MM3 (ref 0–0.05)
IMM GRANULOCYTES NFR BLD AUTO: 0 % (ref 0–0.5)
LYMPHOCYTES # BLD AUTO: 0.36 10*3/MM3 (ref 0.7–3.1)
LYMPHOCYTES NFR BLD AUTO: 14.3 % (ref 19.6–45.3)
MCH RBC QN AUTO: 33.2 PG (ref 26.6–33)
MCHC RBC AUTO-ENTMCNC: 32.9 G/DL (ref 31.5–35.7)
MCV RBC AUTO: 100.8 FL (ref 79–97)
MONOCYTES # BLD AUTO: 0.44 10*3/MM3 (ref 0.1–0.9)
MONOCYTES NFR BLD AUTO: 17.5 % (ref 5–12)
NEUTROPHILS NFR BLD AUTO: 1.61 10*3/MM3 (ref 1.7–7)
NEUTROPHILS NFR BLD AUTO: 63.8 % (ref 42.7–76)
NRBC BLD AUTO-RTO: 0 /100 WBC (ref 0–0.2)
PLATELET # BLD AUTO: 234 10*3/MM3 (ref 140–450)
PMV BLD AUTO: 11.9 FL (ref 6–12)
POTASSIUM SERPL-SCNC: 4.3 MMOL/L (ref 3.5–5.2)
PROT SERPL-MCNC: 7.4 G/DL (ref 6–8.5)
RBC # BLD AUTO: 3.92 10*6/MM3 (ref 3.77–5.28)
SODIUM SERPL-SCNC: 142 MMOL/L (ref 136–145)
WBC NRBC COR # BLD: 2.52 10*3/MM3 (ref 3.4–10.8)

## 2023-08-25 PROCEDURE — 99214 OFFICE O/P EST MOD 30 MIN: CPT | Performed by: PSYCHIATRY & NEUROLOGY

## 2023-08-25 PROCEDURE — 36415 COLL VENOUS BLD VENIPUNCTURE: CPT

## 2023-08-25 PROCEDURE — 80053 COMPREHEN METABOLIC PANEL: CPT

## 2023-08-25 PROCEDURE — 85025 COMPLETE CBC W/AUTO DIFF WBC: CPT

## 2023-08-25 RX ORDER — MODAFINIL 200 MG/1
200 TABLET ORAL DAILY PRN
Qty: 90 TABLET | Refills: 2 | Status: SHIPPED | OUTPATIENT
Start: 2023-08-25

## 2023-08-25 RX ORDER — BUPROPION HYDROCHLORIDE 300 MG/1
300 TABLET ORAL EVERY MORNING
Qty: 90 TABLET | Refills: 3 | Status: SHIPPED | OUTPATIENT
Start: 2023-08-25 | End: 2024-08-24

## 2023-08-25 RX ORDER — GABAPENTIN 300 MG/1
300 CAPSULE ORAL 3 TIMES DAILY
Qty: 270 CAPSULE | Refills: 3 | Status: SHIPPED | OUTPATIENT
Start: 2023-08-25 | End: 2024-08-24

## 2023-08-25 RX ORDER — BUPROPION HYDROCHLORIDE 150 MG/1
150 TABLET ORAL EVERY MORNING
Qty: 90 TABLET | Refills: 3 | Status: SHIPPED | OUTPATIENT
Start: 2023-08-25 | End: 2024-08-24

## 2023-09-11 ENCOUNTER — PRIOR AUTHORIZATION (OUTPATIENT)
Dept: NEUROLOGY | Facility: CLINIC | Age: 58
End: 2023-09-11
Payer: COMMERCIAL

## 2023-09-23 NOTE — PROGRESS NOTES
Chief Complaint    Multiple Sclerosis    Subjective        Mckenna Castellano presents to [facility] NEUROLOGY  History of Present Illness    58 y.o. female returns in follow up.  Last visit on 2/24/23 continued Gilenya, rx GBP,  Provigil, increased Wellbutrin, referred to PT.       11/22/22 CBC, CMP - NCS     RRMS     MSFC normal     MRI Brain/Cervical, 1/27/23 as compared to 12/23/21 few scattered T2 lesions, PVWM and medulla without new/enlarging/enhancing lesions, no cord lesion     Tolerating Gilenya without side effects.      Neuropathic pain       mg qhs.  Feet/hands develop B/N/T when under stress.      Fatigue      Provigil prn for fatigue helps with fatigue     Fatigue is moderate.  Heat intolerance is moderate.       MDD     Mood needs improvement  on Wellbutrin      Problem history:     Developed bilateral LE numbness.  Then progressed to left leg up to Face and arm.  Left sided increased sensitivity to touch.  Sx lasted for 3 months.  GBP used to improve pain.  No other relapses since first event.       Started on Aubagio in 10/2017.  Noted mild hair loss after starting.  Denies new or worsening sx.  Heat intolerance is moderate.  Fatigue is mild to moderate.  Balance is off and left leg is weaker.  ST memory decreasing.  Bladder frequency and urgency.       Feet have N/T at bedtime.   mg qhs wears off and does not last all night.         Reviewed previous medical records:     Dx with MS in 2005 by Dr Benitez Fraser.  Onset had numbness in both legs, then LE F/A/L N and allodynia.  Treated with interferon for 2 months, N/V.  Switched to Copaxone for 4 -5 years.  Then to Gilenya.  Tolerating Gilenya without noted dz activity.  Moved to Aubagio due to low counts.  During switch to Aubagio noted new left medullary lesion with left leg weakness.  Stopped Aubagio due to insurance issues.       MRI Brain - 10/2/17 new area of enhancement in right medulla, moderate T2 lesions     "  Objective   Vital Signs:  /76   Pulse 68   Ht 162.6 cm (64.02\")   Wt 62.1 kg (137 lb)   SpO2 98%   BMI 23.50 kg/mý   Estimated body mass index is 23.5 kg/mý as calculated from the following:    Height as of this encounter: 162.6 cm (64.02\").    Weight as of this encounter: 62.1 kg (137 lb).           Neurologic Exam     Mental Status   Oriented to person, place, and time.   Speech: speech is normal   Level of consciousness: alert  Knowledge: good and consistent with education.   Normal comprehension.     Cranial Nerves   Cranial nerves II through XII intact.     CN II   Visual fields full to confrontation.   Visual acuity: normal  Right visual field deficit: none  Left visual field deficit: none     CN III, IV, VI   Pupils are equal, round, and reactive to light.  Extraocular motions are normal.   Nystagmus: none   Diplopia: none  Ophthalmoparesis: none  Upgaze: normal  Downgaze: normal  Conjugate gaze: present    CN V   Facial sensation intact.   Right corneal reflex: normal  Left corneal reflex: normal    CN VII   Right facial weakness: none  Left facial weakness: none    CN VIII   Hearing: intact    CN IX, X   Palate: symmetric  Right gag reflex: normal  Left gag reflex: normal    CN XI   Right sternocleidomastoid strength: normal  Left sternocleidomastoid strength: normal    CN XII   Tongue: not atrophic  Fasciculations: absent  Tongue deviation: none    Motor Exam   Muscle bulk: normal  Overall muscle tone: normal    Strength   Strength 5/5 throughout.     Sensory Exam   Light touch normal.     Gait, Coordination, and Reflexes     Gait  Gait: normal    Tremor   Resting tremor: absent  Intention tremor: absent  Action tremor: absent    Reflexes   Reflexes 2+ except as noted.      Physical Exam  Eyes:      Extraocular Movements: EOM normal.      Pupils: Pupils are equal, round, and reactive to light.   Neurological:      Mental Status: She is oriented to person, place, and time.      Cranial Nerves: " Cranial nerves 2-12 are intact.      Motor: Motor strength is normal.      Gait: Gait is intact.   Psychiatric:         Speech: Speech normal.      Result Review :  The following data was reviewed by: Surinder Stover MD on 08/25/2023:  Common labs          11/22/2022    13:54   Common Labs   Glucose 97    BUN 14    Creatinine 0.91    Sodium 143    Potassium 5.0    Chloride 105    Calcium 9.4    Albumin 4.60    Total Bilirubin 0.3    Alkaline Phosphatase 91    AST (SGOT) 35    ALT (SGPT) 29    WBC 2.27    Hemoglobin 12.0    Hematocrit 35.5    Platelets 176                   Assessment and Plan   Diagnoses and all orders for this visit:    1. Multiple sclerosis (Primary)  Assessment & Plan:  Stable on Gilenya     CBC, CMP    Orders:  -     modafinil (PROVIGIL) 200 MG tablet; Take 1 tablet by mouth Daily As Needed (fatigue).  Dispense: 90 tablet; Refill: 2    2. Neuropathic pain  Assessment & Plan:  Increase  mg TID     Orders:  -     gabapentin (NEURONTIN) 300 MG capsule; Take 1 capsule by mouth 3 (Three) Times a Day.  Dispense: 270 capsule; Refill: 3  -     modafinil (PROVIGIL) 200 MG tablet; Take 1 tablet by mouth Daily As Needed (fatigue).  Dispense: 90 tablet; Refill: 2    3. Episode of recurrent major depressive disorder, unspecified depression episode severity  Assessment & Plan:  Patient's depression is recurrent and is mild without psychosis. Their depression is currently in partial remission and the condition is improving with treatment. This will be reassessed at the next regular appointment. F/U as described:patient will continue current medication therapy.      4. Neuropathic pain  Comments:  Stop Lyrica, start GBP  Assessment & Plan:  Increase  mg TID     Orders:  -     gabapentin (NEURONTIN) 300 MG capsule; Take 1 capsule by mouth 3 (Three) Times a Day.  Dispense: 270 capsule; Refill: 3  -     modafinil (PROVIGIL) 200 MG tablet; Take 1 tablet by mouth Daily As Needed (fatigue).  Dispense:  90 tablet; Refill: 2    5. High risk medication use  -     gabapentin (NEURONTIN) 300 MG capsule; Take 1 capsule by mouth 3 (Three) Times a Day.  Dispense: 270 capsule; Refill: 3    6. Controlled substance agreement signed  -     gabapentin (NEURONTIN) 300 MG capsule; Take 1 capsule by mouth 3 (Three) Times a Day.  Dispense: 270 capsule; Refill: 3    7. Multiple sclerosis  Comments:  Cont Gilenya.   Labs today  MRI next visit  Assessment & Plan:  Stable on Gilenya     CBC, CMP    Orders:  -     modafinil (PROVIGIL) 200 MG tablet; Take 1 tablet by mouth Daily As Needed (fatigue).  Dispense: 90 tablet; Refill: 2    8. Chronic fatigue  Comments:  Refill provigil  Orders:  -     modafinil (PROVIGIL) 200 MG tablet; Take 1 tablet by mouth Daily As Needed (fatigue).  Dispense: 90 tablet; Refill: 2    9. Neuropathic pain  Comments:  Lyrica refilled  Assessment & Plan:  Increase  mg TID     Orders:  -     gabapentin (NEURONTIN) 300 MG capsule; Take 1 capsule by mouth 3 (Three) Times a Day.  Dispense: 270 capsule; Refill: 3  -     modafinil (PROVIGIL) 200 MG tablet; Take 1 tablet by mouth Daily As Needed (fatigue).  Dispense: 90 tablet; Refill: 2    Other orders  -     buPROPion XL (Wellbutrin XL) 300 MG 24 hr tablet; Take 1 tablet by mouth Every Morning.  Dispense: 90 tablet; Refill: 3  -     buPROPion XL (Wellbutrin XL) 150 MG 24 hr tablet; Take 1 tablet by mouth Every Morning.  Dispense: 90 tablet; Refill: 3             Follow Up   No follow-ups on file.  Patient was given instructions and counseling regarding her condition or for health maintenance advice. Please see specific information pulled into the AVS if appropriate.          No

## 2023-11-24 ENCOUNTER — SPECIALTY PHARMACY (OUTPATIENT)
Dept: ONCOLOGY | Facility: HOSPITAL | Age: 58
End: 2023-11-24
Payer: COMMERCIAL

## 2024-01-30 ENCOUNTER — TELEPHONE (OUTPATIENT)
Dept: NEUROLOGY | Facility: CLINIC | Age: 59
End: 2024-01-30
Payer: COMMERCIAL

## 2024-01-30 DIAGNOSIS — G35 MULTIPLE SCLEROSIS: ICD-10-CM

## 2024-01-30 DIAGNOSIS — R53.82 CHRONIC FATIGUE: ICD-10-CM

## 2024-01-30 DIAGNOSIS — M79.2 NEUROPATHIC PAIN: ICD-10-CM

## 2024-01-30 RX ORDER — ONDANSETRON 4 MG/1
4 TABLET, FILM COATED ORAL EVERY 12 HOURS PRN
Qty: 60 TABLET | Refills: 5 | Status: SHIPPED | OUTPATIENT
Start: 2024-01-30

## 2024-01-30 RX ORDER — MODAFINIL 200 MG/1
200 TABLET ORAL DAILY PRN
Qty: 90 TABLET | Refills: 2 | Status: SHIPPED | OUTPATIENT
Start: 2024-01-30

## 2024-01-30 NOTE — TELEPHONE ENCOUNTER
"  Caller: Mckenna Castellano \"Kat\"    Relationship: Self    Best call back number: [phone]    What is the best time to reach you: ANYTIME    What was the call regarding: PATIENT STATED THAT THE NEW INS FOR HER REQUIRES A PA DONE FOR THE PROVIGIL RX. PATIENT ONLY HAS HALF A PILL LEFT OF THE MEDICATION.     PLEASE REVIEW  THANK YOU   "

## 2024-01-30 NOTE — TELEPHONE ENCOUNTER
"    Caller: Mckenna Castellano \"Kat\"    Relationship: Self    Best call back number: [phone] (home)     Requested Prescriptions:   Requested Prescriptions     Pending Prescriptions Disp Refills    ondansetron (Zofran) 4 MG tablet 60 tablet 5     Sig: Take 1 tablet by mouth Every 12 (Twelve) Hours As Needed for Nausea or Vomiting.    modafinil (PROVIGIL) 200 MG tablet 90 tablet 2     Sig: Take 1 tablet by mouth Daily As Needed (fatigue).        Pharmacy where request should be sent: [facility], [facility]. [place] [address] - [phone]  - [phone] FX     Last office visit with prescribing clinician: 8/25/2023   Last telemedicine visit with prescribing clinician: Visit date not found   Next office visit with prescribing clinician: 2/26/2024     Additional details provided by patient: PATIENT HAS HALF A PILL LEFT OF THE PROVIGIL BUT THE INS IS REQUIRING A PA AND SHE IS OUT OF HER ZOFRAN. SHE DOES NOT BELIEVE THAT [facility] DOES NOT HAVE A RX FOR THE ZOFRAN.     Does the patient have less than a 3 day supply:  [x] Yes  [] No    Would you like a call back once the refill request has been completed: [x] Yes [] No    If the office needs to give you a call back, can they leave a voicemail: [x] Yes [] No    John Shaffer Rep   01/30/24 12:54 EST   "

## 2024-01-30 NOTE — TELEPHONE ENCOUNTER
Spoke to patient to verify insurance information.  Informed that I would start the prior authorization.  She expressed appreciation.

## 2024-01-30 NOTE — TELEPHONE ENCOUNTER
Rx Refill Note  Requested Prescriptions     Pending Prescriptions Disp Refills    ondansetron (Zofran) 4 MG tablet 60 tablet 5     Sig: Take 1 tablet by mouth Every 12 (Twelve) Hours As Needed for Nausea or Vomiting.    modafinil (PROVIGIL) 200 MG tablet 90 tablet 2     Sig: Take 1 tablet by mouth Daily As Needed (fatigue).      Last filled:  06/03/2022 w/5 ; 08/25/2023 w/2  Last office visit with prescribing clinician: 8/25/2023      Next office visit with prescribing clinician: 2/26/2024     Marcelle Tang MA  01/30/24, 15:16 EST

## 2024-02-20 ENCOUNTER — TELEPHONE (OUTPATIENT)
Dept: NEUROLOGY | Facility: CLINIC | Age: 59
End: 2024-02-20
Payer: COMMERCIAL

## 2024-02-20 DIAGNOSIS — G35 MULTIPLE SCLEROSIS: Primary | ICD-10-CM

## 2024-02-20 NOTE — TELEPHONE ENCOUNTER
"    Caller: Mckenna Castellano \"Kat\"    Relationship to patient: Self    Best call back number:[phone] (home)     Chief complaint: MS    Type of visit: FOLLOW UP    Requested date: ASAP     If rescheduling, when is the original appointment: 2/26/24     Additional notes:PATIENT HAS A PERSONAL EMERGENCY AND NEEDED TO CANCEL HER APPT. SHE IS UNSURE IF SHE IS TO HAVE AN MRI DONE BEFORE HER NEXT APPT DUE TO IT BEING HER ANNUAL.     IF SHE IS TO HAVE AN MRI SHE WOULD LIKE IT DONE AT OPEN SIDED MRI OFF [facility].     PLEASE ADVISE  THANK YOU    "

## 2024-03-01 ENCOUNTER — OFFICE VISIT (OUTPATIENT)
Dept: NEUROLOGY | Facility: CLINIC | Age: 59
End: 2024-03-01
Payer: COMMERCIAL

## 2024-03-01 ENCOUNTER — LAB (OUTPATIENT)
Dept: LAB | Facility: HOSPITAL | Age: 59
End: 2024-03-01
Payer: COMMERCIAL

## 2024-03-01 VITALS
OXYGEN SATURATION: 98 % | WEIGHT: 133 LBS | BODY MASS INDEX: 22.71 KG/M2 | HEIGHT: 64 IN | SYSTOLIC BLOOD PRESSURE: 136 MMHG | HEART RATE: 59 BPM | DIASTOLIC BLOOD PRESSURE: 80 MMHG

## 2024-03-01 DIAGNOSIS — G35 MULTIPLE SCLEROSIS: ICD-10-CM

## 2024-03-01 DIAGNOSIS — M79.2 NEUROPATHIC PAIN: ICD-10-CM

## 2024-03-01 DIAGNOSIS — Z79.899 CONTROLLED SUBSTANCE AGREEMENT SIGNED: ICD-10-CM

## 2024-03-01 DIAGNOSIS — G35 MULTIPLE SCLEROSIS: Primary | Chronic | ICD-10-CM

## 2024-03-01 DIAGNOSIS — R53.82 CHRONIC FATIGUE: ICD-10-CM

## 2024-03-01 DIAGNOSIS — M79.2 NEUROPATHIC PAIN: Chronic | ICD-10-CM

## 2024-03-01 DIAGNOSIS — F33.9 EPISODE OF RECURRENT MAJOR DEPRESSIVE DISORDER, UNSPECIFIED DEPRESSION EPISODE SEVERITY: Chronic | ICD-10-CM

## 2024-03-01 DIAGNOSIS — Z79.899 HIGH RISK MEDICATION USE: ICD-10-CM

## 2024-03-01 LAB
BASOPHILS # BLD AUTO: 0.04 10*3/MM3 (ref 0–0.2)
BASOPHILS NFR BLD AUTO: 1.6 % (ref 0–1.5)
DEPRECATED RDW RBC AUTO: 43.3 FL (ref 37–54)
EOSINOPHIL # BLD AUTO: 0.05 10*3/MM3 (ref 0–0.4)
EOSINOPHIL NFR BLD AUTO: 2 % (ref 0.3–6.2)
ERYTHROCYTE [DISTWIDTH] IN BLOOD BY AUTOMATED COUNT: 12.4 % (ref 12.3–15.4)
HCT VFR BLD AUTO: 33.9 % (ref 34–46.6)
HGB BLD-MCNC: 11.6 G/DL (ref 12–15.9)
IMM GRANULOCYTES # BLD AUTO: 0.01 10*3/MM3 (ref 0–0.05)
IMM GRANULOCYTES NFR BLD AUTO: 0.4 % (ref 0–0.5)
LYMPHOCYTES # BLD AUTO: 0.36 10*3/MM3 (ref 0.7–3.1)
LYMPHOCYTES NFR BLD AUTO: 14.5 % (ref 19.6–45.3)
MCH RBC QN AUTO: 32.9 PG (ref 26.6–33)
MCHC RBC AUTO-ENTMCNC: 34.2 G/DL (ref 31.5–35.7)
MCV RBC AUTO: 96 FL (ref 79–97)
MONOCYTES # BLD AUTO: 0.41 10*3/MM3 (ref 0.1–0.9)
MONOCYTES NFR BLD AUTO: 16.5 % (ref 5–12)
NEUTROPHILS NFR BLD AUTO: 1.61 10*3/MM3 (ref 1.7–7)
NEUTROPHILS NFR BLD AUTO: 65 % (ref 42.7–76)
NRBC BLD AUTO-RTO: 0 /100 WBC (ref 0–0.2)
PLATELET # BLD AUTO: 202 10*3/MM3 (ref 140–450)
PMV BLD AUTO: 12.2 FL (ref 6–12)
RBC # BLD AUTO: 3.53 10*6/MM3 (ref 3.77–5.28)
WBC NRBC COR # BLD AUTO: 2.48 10*3/MM3 (ref 3.4–10.8)

## 2024-03-01 PROCEDURE — 99214 OFFICE O/P EST MOD 30 MIN: CPT | Performed by: PSYCHIATRY & NEUROLOGY

## 2024-03-01 PROCEDURE — 80074 ACUTE HEPATITIS PANEL: CPT

## 2024-03-01 PROCEDURE — 80053 COMPREHEN METABOLIC PANEL: CPT

## 2024-03-01 PROCEDURE — 36415 COLL VENOUS BLD VENIPUNCTURE: CPT

## 2024-03-01 PROCEDURE — 85025 COMPLETE CBC W/AUTO DIFF WBC: CPT

## 2024-03-01 RX ORDER — ONDANSETRON 4 MG/1
4 TABLET, FILM COATED ORAL EVERY 12 HOURS PRN
Qty: 60 TABLET | Refills: 5 | Status: SHIPPED | OUTPATIENT
Start: 2024-03-01

## 2024-03-01 RX ORDER — BUPROPION HYDROCHLORIDE 150 MG/1
150 TABLET ORAL EVERY MORNING
Qty: 90 TABLET | Refills: 3 | Status: SHIPPED | OUTPATIENT
Start: 2024-03-01 | End: 2025-03-01

## 2024-03-01 RX ORDER — BUPROPION HYDROCHLORIDE 300 MG/1
300 TABLET ORAL EVERY MORNING
Qty: 90 TABLET | Refills: 3 | Status: SHIPPED | OUTPATIENT
Start: 2024-03-01 | End: 2025-03-01

## 2024-03-01 RX ORDER — GABAPENTIN 300 MG/1
300 CAPSULE ORAL 3 TIMES DAILY
Qty: 270 CAPSULE | Refills: 3 | Status: SHIPPED | OUTPATIENT
Start: 2024-03-01 | End: 2025-03-01

## 2024-03-01 RX ORDER — MODAFINIL 200 MG/1
200 TABLET ORAL DAILY PRN
Qty: 90 TABLET | Refills: 2 | Status: SHIPPED | OUTPATIENT
Start: 2024-03-01

## 2024-03-01 NOTE — PROGRESS NOTES
Chief Complaint    Multiple Sclerosis    Subjective        Mckenna Castellano presents to [facility] NEUROLOGY  History of Present Illness    59 y.o. female returns in follow up.  Last visit on 8/25/23 continued Gilenya, Provigil, Wellbutrin, increased GBP, ordered labs.      8/25/23 ALT 46, AST 36      RRMS     MSFC normal      MRI Brain/Cervical, 1/27/23 as compared to 12/23/21 few scattered T2 lesions, PVWM and medulla without new/enlarging/enhancing lesions, no cord lesion     Switching to generic Gilenya has increased N/T on left arm and leg.  Sx are 5 out of 7 days.  Moderate intensity.      Walking is unsteady due to numbness.        Neuropathic pain       mg qhs.  Feet/hands worsening N/T     Fatigue      Provigil prn for fatigue helps with fatigue     Fatigue is moderate.  Heat intolerance is moderate.       MDD     Mood needs improvement  on Wellbutrin      Problem history:     Developed bilateral LE numbness.  Then progressed to left leg up to Face and arm.  Left sided increased sensitivity to touch.  Sx lasted for 3 months.  GBP used to improve pain.  No other relapses since first event.       Started on Aubagio in 10/2017.  Noted mild hair loss after starting.  Denies new or worsening sx.  Heat intolerance is moderate.  Fatigue is mild to moderate.  Balance is off and left leg is weaker.  ST memory decreasing.  Bladder frequency and urgency.       Feet have N/T at bedtime.   mg qhs wears off and does not last all night.         Reviewed previous medical records:     Dx with MS in 2005 by Dr Benitez Fraser.  Onset had numbness in both legs, then LE F/A/L N and allodynia.  Treated with interferon for 2 months, N/V.  Switched to Copaxone for 4 -5 years.  Then to Gilenya.  Tolerating Gilenya without noted dz activity.  Moved to Aubagio due to low counts.  During switch to Aubagio noted new left medullary lesion with left leg weakness.  Stopped Aubagio due to insurance issues.      "  MRI Brain - 10/2/17 new area of enhancement in right medulla, moderate T2 lesions            Objective   Vital Signs:  /80   Pulse 59   Ht 162.6 cm (64.02\")   Wt 60.3 kg (133 lb)   SpO2 98%   BMI 22.82 kg/m²   Estimated body mass index is 22.82 kg/m² as calculated from the following:    Height as of this encounter: 162.6 cm (64.02\").    Weight as of this encounter: 60.3 kg (133 lb).       BMI is within normal parameters. No other follow-up for BMI required.    Neurologic Exam     Mental Status   Oriented to person, place, and time.   Speech: speech is normal   Level of consciousness: alert  Knowledge: good and consistent with education.   Normal comprehension.     Cranial Nerves   Cranial nerves II through XII intact.     CN II   Visual fields full to confrontation.   Visual acuity: normal  Right visual field deficit: none  Left visual field deficit: none     CN III, IV, VI   Pupils are equal, round, and reactive to light.  Extraocular motions are normal.   Nystagmus: none   Diplopia: none  Ophthalmoparesis: none  Upgaze: normal  Downgaze: normal  Conjugate gaze: present    CN V   Facial sensation intact.   Right corneal reflex: normal  Left corneal reflex: normal    CN VII   Right facial weakness: none  Left facial weakness: none    CN VIII   Hearing: intact    CN IX, X   Palate: symmetric  Right gag reflex: normal  Left gag reflex: normal    CN XI   Right sternocleidomastoid strength: normal  Left sternocleidomastoid strength: normal    CN XII   Tongue: not atrophic  Fasciculations: absent  Tongue deviation: none    Motor Exam   Muscle bulk: normal  Overall muscle tone: normal    Strength   Strength 5/5 throughout.     Sensory Exam   Light touch normal.     Gait, Coordination, and Reflexes     Gait  Gait: normal    Tremor   Resting tremor: absent  Intention tremor: absent  Action tremor: absent    Reflexes   Reflexes 2+ except as noted.        Physical Exam  Eyes:      Extraocular Movements: EOM " normal.      Pupils: Pupils are equal, round, and reactive to light.   Neurological:      Mental Status: She is oriented to person, place, and time.      Cranial Nerves: Cranial nerves 2-12 are intact.      Motor: Motor strength is normal.     Gait: Gait is intact.   Psychiatric:         Speech: Speech normal.        Result Review :    The following data was reviewed by: Surinder Stover MD on 03/01/2024:  Common labs          8/25/2023    10:58   Common Labs   Glucose 95    BUN 13    Creatinine 0.99    Sodium 142    Potassium 4.3    Chloride 102    Calcium 10.0    Albumin 4.9    Total Bilirubin 0.3    Alkaline Phosphatase 108    AST (SGOT) 36    ALT (SGPT) 46    WBC 2.52    Hemoglobin 13.0    Hematocrit 39.5    Platelets 234                   Assessment and Plan     Diagnoses and all orders for this visit:    1. Multiple sclerosis (Primary)  -     Hepatitis Panel, Acute; Future  -     CBC & Differential; Future  -     Comprehensive Metabolic Panel; Future  -     modafinil (PROVIGIL) 200 MG tablet; Take 1 tablet by mouth Daily As Needed (fatigue).  Dispense: 90 tablet; Refill: 2    2. Neuropathic pain  -     gabapentin (NEURONTIN) 300 MG capsule; Take 1 capsule by mouth 3 (Three) Times a Day.  Dispense: 270 capsule; Refill: 3  -     modafinil (PROVIGIL) 200 MG tablet; Take 1 tablet by mouth Daily As Needed (fatigue).  Dispense: 90 tablet; Refill: 2    3. Episode of recurrent major depressive disorder, unspecified depression episode severity    4. Chronic fatigue  -     modafinil (PROVIGIL) 200 MG tablet; Take 1 tablet by mouth Daily As Needed (fatigue).  Dispense: 90 tablet; Refill: 2    5. Neuropathic pain  Comments:  Stop Lyrica, start GBP  Orders:  -     gabapentin (NEURONTIN) 300 MG capsule; Take 1 capsule by mouth 3 (Three) Times a Day.  Dispense: 270 capsule; Refill: 3  -     modafinil (PROVIGIL) 200 MG tablet; Take 1 tablet by mouth Daily As Needed (fatigue).  Dispense: 90 tablet; Refill: 2    6. High risk  medication use  -     gabapentin (NEURONTIN) 300 MG capsule; Take 1 capsule by mouth 3 (Three) Times a Day.  Dispense: 270 capsule; Refill: 3    7. Controlled substance agreement signed  -     gabapentin (NEURONTIN) 300 MG capsule; Take 1 capsule by mouth 3 (Three) Times a Day.  Dispense: 270 capsule; Refill: 3    8. Multiple sclerosis  Comments:  Quirino Mcconnellenya.   Labs today  MRI next visit  Orders:  -     Hepatitis Panel, Acute; Future  -     CBC & Differential; Future  -     Comprehensive Metabolic Panel; Future  -     modafinil (PROVIGIL) 200 MG tablet; Take 1 tablet by mouth Daily As Needed (fatigue).  Dispense: 90 tablet; Refill: 2    9. Chronic fatigue  Comments:  Refill provigil  Orders:  -     modafinil (PROVIGIL) 200 MG tablet; Take 1 tablet by mouth Daily As Needed (fatigue).  Dispense: 90 tablet; Refill: 2    10. Neuropathic pain  Comments:  Lyrica refilled  Orders:  -     gabapentin (NEURONTIN) 300 MG capsule; Take 1 capsule by mouth 3 (Three) Times a Day.  Dispense: 270 capsule; Refill: 3  -     modafinil (PROVIGIL) 200 MG tablet; Take 1 tablet by mouth Daily As Needed (fatigue).  Dispense: 90 tablet; Refill: 2    Other orders  -     buPROPion XL (Wellbutrin XL) 300 MG 24 hr tablet; Take 1 tablet by mouth Every Morning.  Dispense: 90 tablet; Refill: 3  -     buPROPion XL (Wellbutrin XL) 150 MG 24 hr tablet; Take 1 tablet by mouth Every Morning.  Dispense: 90 tablet; Refill: 3  -     ondansetron (Zofran) 4 MG tablet; Take 1 tablet by mouth Every 12 (Twelve) Hours As Needed for Nausea or Vomiting.  Dispense: 60 tablet; Refill: 5             Follow Up     No follow-ups on file.  Patient was given instructions and counseling regarding her condition or for health maintenance advice. Please see specific information pulled into the AVS if appropriate.

## 2024-03-02 LAB
ALBUMIN SERPL-MCNC: 4.7 G/DL (ref 3.5–5.2)
ALBUMIN/GLOB SERPL: 2.5 G/DL
ALP SERPL-CCNC: 88 U/L (ref 39–117)
ALT SERPL W P-5'-P-CCNC: 27 U/L (ref 1–33)
ANION GAP SERPL CALCULATED.3IONS-SCNC: 12 MMOL/L (ref 5–15)
AST SERPL-CCNC: 33 U/L (ref 1–32)
BILIRUB SERPL-MCNC: 0.2 MG/DL (ref 0–1.2)
BUN SERPL-MCNC: 18 MG/DL (ref 8–23)
BUN/CREAT SERPL: 15.1 (ref 7–25)
CALCIUM SPEC-SCNC: 9.6 MG/DL (ref 8.6–10.5)
CHLORIDE SERPL-SCNC: 105 MMOL/L (ref 98–107)
CO2 SERPL-SCNC: 26 MMOL/L (ref 22–29)
CREAT SERPL-MCNC: 1.19 MG/DL (ref 0.57–1)
EGFRCR SERPLBLD CKD-EPI 2021: 52.5 ML/MIN/1.73
GLOBULIN UR ELPH-MCNC: 1.9 GM/DL
GLUCOSE SERPL-MCNC: 88 MG/DL (ref 65–99)
HAV IGM SERPL QL IA: NORMAL
HBV CORE IGM SERPL QL IA: NORMAL
HBV SURFACE AG SERPL QL IA: NORMAL
HCV AB SER DONR QL: NORMAL
POTASSIUM SERPL-SCNC: 4.5 MMOL/L (ref 3.5–5.2)
PROT SERPL-MCNC: 6.6 G/DL (ref 6–8.5)
SODIUM SERPL-SCNC: 143 MMOL/L (ref 136–145)

## 2024-03-04 DIAGNOSIS — R53.82 CHRONIC FATIGUE: ICD-10-CM

## 2024-03-04 DIAGNOSIS — G35 MULTIPLE SCLEROSIS: ICD-10-CM

## 2024-03-04 DIAGNOSIS — M79.2 NEUROPATHIC PAIN: ICD-10-CM

## 2024-03-04 RX ORDER — MODAFINIL 200 MG/1
TABLET ORAL
Qty: 30 TABLET | Refills: 2 | OUTPATIENT
Start: 2024-03-04

## 2024-03-04 NOTE — TELEPHONE ENCOUNTER
duplicate    Rx Refill Note  Requested Prescriptions     Pending Prescriptions Disp Refills    modafinil (PROVIGIL) 200 MG tablet [Pharmacy Med Name: MODAFINIL 200 MG  Tablet] 30 tablet 2     Sig: TAKE 1 TABLET BY MOUTH EVERY DAY AS NEEDED FOR FATIGUE.      Last filled:   03/01/2024 w/2  Last office visit with prescribing clinician: 3/1/2024      Next office visit with prescribing clinician: Visit date not found     Marcelle Tang MA  03/04/24, 12:03 EST

## 2024-03-07 ENCOUNTER — TELEPHONE (OUTPATIENT)
Dept: NEUROLOGY | Facility: CLINIC | Age: 59
End: 2024-03-07
Payer: COMMERCIAL

## 2024-03-07 ENCOUNTER — SPECIALTY PHARMACY (OUTPATIENT)
Dept: ONCOLOGY | Facility: HOSPITAL | Age: 59
End: 2024-03-07
Payer: COMMERCIAL

## 2024-03-07 NOTE — TELEPHONE ENCOUNTER
Per patient she is receiving calls from a pharmacy. She is unsure if this is related to the kesimpta. She is ignoring the the phone calls  ThanksWindy

## 2024-04-05 ENCOUNTER — TELEPHONE (OUTPATIENT)
Dept: NEUROLOGY | Facility: CLINIC | Age: 59
End: 2024-04-05
Payer: COMMERCIAL

## 2024-04-05 NOTE — TELEPHONE ENCOUNTER
LVM letting patient know I was wanting more clarification if patient is just starting 1 shot a week for 3 weeks or starting her first monthly dosing. Also, advised she go to PCP to make sure she does not have a sinus infection or anything else before starting. If patient is sick we will likely have her hold until feeling better. Left call back number.

## 2024-04-05 NOTE — TELEPHONE ENCOUNTER
Spoke to patient to inform per Dr Stover that if her illness is only her allergies to go ahead and do her Kesimpta injection tonight.  She expressed appreciation.

## 2024-04-05 NOTE — TELEPHONE ENCOUNTER
Provider: TERESA RIVERO MD    Caller: [place]    Relationship to Patient: SELF    Phone Number: [phone]    Reason for Call: CALLING REGARDING HER KESIMPTA.  STATED THE REPRESENTATIVE TOLD HER IF SHE HAS AN INFECTION TO WAIT TO TAKE THE MEDICATION.   STATED SHE HAS EITHER ALLERGY CONGESTION OR SINUS CONGESTION.  PATIENT IS WANING TO KNOW IF SHE CAN TAKE THE MEDICATION TODAY OR WAIT.  STATED SHE IS TO TAKE THE FIRST DAY OF THE FIRST DOSAGE SHE IS TO TAKE.      PLEASE CALL & ADVISE

## 2024-04-05 NOTE — TELEPHONE ENCOUNTER
PT ASKED FOR LUIZA BUT SHE IS OUT FOR THE DAY. SHE IS ON MONTHLY DOSE OF KISEMPTA. LUIZA TOLD HER PREVIOUSLY THAT IF SHE HAS AN INFECTION TO CALL HER PCP. BUT SHE IS IN [place] AND IS UNSURE OF WHAT TO DO AT THE MOMENT. JUST NEEDS SOME CLINICAL ADVICE AND WANTS TO MAKE SURE IT'S OKAY TO TAKE KISEMPTA TODAY.

## 2024-04-05 NOTE — TELEPHONE ENCOUNTER
Spoke to Kat.  She states that she thinks it is just her allergies bothering her and that she does not have an actual infection.  She has clear mucus and her head is slightly stuffy.  No congestion, cough, itchy or watery eyes.  Should she do her Kesimpta injection today?

## 2024-04-08 ENCOUNTER — SPECIALTY PHARMACY (OUTPATIENT)
Dept: ONCOLOGY | Facility: HOSPITAL | Age: 59
End: 2024-04-08
Payer: COMMERCIAL

## 2024-05-25 ENCOUNTER — TELEMEDICINE (OUTPATIENT)
Dept: FAMILY MEDICINE CLINIC | Facility: TELEHEALTH | Age: 59
End: 2024-05-25
Payer: COMMERCIAL

## 2024-05-25 DIAGNOSIS — J06.9 UPPER RESPIRATORY TRACT INFECTION, UNSPECIFIED TYPE: Primary | ICD-10-CM

## 2024-05-25 RX ORDER — FLUTICASONE PROPIONATE 50 MCG
2 SPRAY, SUSPENSION (ML) NASAL DAILY
Qty: 16 ML | Refills: 0 | Status: SHIPPED | OUTPATIENT
Start: 2024-05-25

## 2024-05-25 RX ORDER — BROMPHENIRAMINE MALEATE, PSEUDOEPHEDRINE HYDROCHLORIDE, AND DEXTROMETHORPHAN HYDROBROMIDE 2; 30; 10 MG/5ML; MG/5ML; MG/5ML
10 SYRUP ORAL 4 TIMES DAILY PRN
Qty: 240 ML | Refills: 0 | Status: SHIPPED | OUTPATIENT
Start: 2024-05-25

## 2024-05-25 RX ORDER — AMOXICILLIN AND CLAVULANATE POTASSIUM 875; 125 MG/1; MG/1
1 TABLET, FILM COATED ORAL 2 TIMES DAILY
Qty: 10 TABLET | Refills: 0 | Status: SHIPPED | OUTPATIENT
Start: 2024-05-25 | End: 2024-05-30

## 2024-05-25 NOTE — PROGRESS NOTES
Subjective   Chief Complaint   Patient presents with    Sinus Problem    URI       Mckenna Castellano is a 59 y.o. female.     History of Present Illness  Patient reports cough, congestion  and drainage that started yesterday.  Drainage is thick and yellow.  Her chest hurts when she coughs.  She has a history of bronchitis and walking pneumonia is and is concerned about a respiratory infection.  She has MS and is scheduled to have a Kesimpta injection in a week and cannot take this medication with an infection.   URI   This is a new problem. The current episode started yesterday. The problem has been gradually worsening. There has been no fever. Associated symptoms include congestion, coughing, rhinorrhea, sneezing and a sore throat. Pertinent negatives include no abdominal pain, chest pain, diarrhea, dysuria, ear pain, nausea, plugged ear sensation, vomiting or wheezing. Treatments tried: mucinex.        Allergies   Allergen Reactions    Ciprofloxacin Hives    Zyrtec [Cetirizine] Unknown - Low Severity     Patient does not prefer to take; can tolerate allegra, claritin and benadryl       Past Medical History:   Diagnosis Date    Anxiety     Depression     Hyperlipidemia     Migraine     MS (multiple sclerosis)        Past Surgical History:   Procedure Laterality Date    AUGMENTATION MAMMAPLASTY Bilateral 2012    HYSTERECTOMY N/A     TONSILLECTOMY         Social History     Socioeconomic History    Marital status:    Tobacco Use    Smoking status: Never     Passive exposure: Never    Smokeless tobacco: Never   Vaping Use    Vaping status: Never Used   Substance and Sexual Activity    Alcohol use: Yes     Alcohol/week: 4.0 - 7.0 standard drinks of alcohol     Types: 2 - 3 Glasses of wine, 1 - 3 Cans of beer, 1 Drinks containing 0.5 oz of alcohol per week     Comment: Sometimes less- “lite” beer    Drug use: No    Sexual activity: Yes     Partners: Male     Birth control/protection: Post-menopausal, None      Comment: Partial hysterectomy       Family History   Adopted: Yes   Problem Relation Age of Onset    Dementia Maternal Grandmother     Breast cancer Neg Hx     Ovarian cancer Neg Hx          Current Outpatient Medications:     amoxicillin-clavulanate (AUGMENTIN) 875-125 MG per tablet, Take 1 tablet by mouth 2 (Two) Times a Day for 5 days., Disp: 10 tablet, Rfl: 0    brompheniramine-pseudoephedrine-DM 30-2-10 MG/5ML syrup, Take 10 mL by mouth 4 (Four) Times a Day As Needed for Congestion, Cough or Allergies., Disp: 240 mL, Rfl: 0    buPROPion XL (Wellbutrin XL) 150 MG 24 hr tablet, Take 1 tablet by mouth Every Morning., Disp: 90 tablet, Rfl: 3    buPROPion XL (Wellbutrin XL) 300 MG 24 hr tablet, Take 1 tablet by mouth Every Morning., Disp: 90 tablet, Rfl: 3    fluticasone (FLONASE) 50 MCG/ACT nasal spray, 2 sprays into the nostril(s) as directed by provider Daily., Disp: 16 mL, Rfl: 0    gabapentin (NEURONTIN) 300 MG capsule, Take 1 capsule by mouth 3 (Three) Times a Day., Disp: 270 capsule, Rfl: 3    modafinil (PROVIGIL) 200 MG tablet, Take 1 tablet by mouth Daily As Needed (fatigue)., Disp: 90 tablet, Rfl: 2    Ofatumumab 20 MG/0.4ML solution auto-injector, Inject 20 mg under the skin into the appropriate area as directed Every 28 (Twenty-Eight) Days. Pt received loading dose in the providers office, Disp: 1.2 mL, Rfl: 3    ondansetron (Zofran) 4 MG tablet, Take 1 tablet by mouth Every 12 (Twelve) Hours As Needed for Nausea or Vomiting., Disp: 60 tablet, Rfl: 5    rosuvastatin (CRESTOR) 10 MG tablet, Take 1 tablet by mouth Every Night., Disp: , Rfl: 0      Review of Systems   Constitutional:  Positive for fatigue. Negative for chills, diaphoresis and fever.   HENT:  Positive for congestion, postnasal drip, rhinorrhea, sneezing, sore throat and voice change. Negative for ear pain.    Respiratory:  Positive for cough. Negative for chest tightness, shortness of breath and wheezing.    Cardiovascular:  Negative for  chest pain.   Gastrointestinal:  Negative for abdominal pain, diarrhea, nausea and vomiting.   Genitourinary:  Negative for dysuria.   Musculoskeletal:  Negative for myalgias.   Neurological:  Negative for headache.        There were no vitals filed for this visit.    Objective   Physical Exam  Constitutional:       General: She is not in acute distress.     Appearance: Normal appearance. She is not ill-appearing, toxic-appearing or diaphoretic.   HENT:      Head: Normocephalic.      Nose: Congestion present.      Mouth/Throat:      Lips: Pink.      Mouth: Mucous membranes are moist.   Pulmonary:      Effort: Pulmonary effort is normal.   Neurological:      Mental Status: She is alert and oriented to person, place, and time.          Procedures     Assessment & Plan   Diagnoses and all orders for this visit:    1. Upper respiratory tract infection, unspecified type (Primary)  -     brompheniramine-pseudoephedrine-DM 30-2-10 MG/5ML syrup; Take 10 mL by mouth 4 (Four) Times a Day As Needed for Congestion, Cough or Allergies.  Dispense: 240 mL; Refill: 0  -     fluticasone (FLONASE) 50 MCG/ACT nasal spray; 2 sprays into the nostril(s) as directed by provider Daily.  Dispense: 16 mL; Refill: 0    Other orders  -     amoxicillin-clavulanate (AUGMENTIN) 875-125 MG per tablet; Take 1 tablet by mouth 2 (Two) Times a Day for 5 days.  Dispense: 10 tablet; Refill: 0      At this time symptoms are most likely allergy or viral in nature.  Recommend continue Mucinex along with prescribed cough syrup and Flonase.    If symptoms do not improve with above treatment over several days you may take the antibiotics.  If symptoms worsen or do not improve follow up with your PCP or visit your nearest Urgent Care Center or ER.      PLAN: Discussed dosing, side effects, recommended other symptomatic care.  Patient should follow up with primary care provider, Urgent Care or ER if symptoms worsen, fail to resolve or other symptoms need  attention. Patient/family agree to the above.         GUILLE Ledesma     The use of a video visit has been reviewed with the patient and verbal informed consent has been obtained. Myself and Mckenna Castellano participated in this visit. The patient is located at [facility] [address]  [place] [id number]. I am located in [place], [place]. Mychart and Zoom were utilized.        This visit was performed via Telehealth.  This patient has been instructed to follow-up with their primary care provider if their symptoms worsen or the treatment provided does not resolve their illness.

## 2024-05-25 NOTE — PATIENT INSTRUCTIONS
At this time symptoms are most likely allergy or viral in nature.  Recommend continue Mucinex along with prescribed cough syrup and Flonase.    If symptoms do not improve with above treatment over several days you may take the antibiotics.  If symptoms worsen or do not improve follow up with your PCP or visit your nearest Urgent Care Center or ER.

## 2024-05-31 DIAGNOSIS — G35 MULTIPLE SCLEROSIS: Primary | Chronic | ICD-10-CM

## 2024-06-14 DIAGNOSIS — Z79.899 CONTROLLED SUBSTANCE AGREEMENT SIGNED: ICD-10-CM

## 2024-06-14 DIAGNOSIS — Z79.899 HIGH RISK MEDICATION USE: ICD-10-CM

## 2024-06-14 DIAGNOSIS — M79.2 NEUROPATHIC PAIN: Chronic | ICD-10-CM

## 2024-06-14 RX ORDER — GABAPENTIN 300 MG/1
300 CAPSULE ORAL 3 TIMES DAILY
Qty: 270 CAPSULE | Refills: 1 | Status: SHIPPED | OUTPATIENT
Start: 2024-06-14 | End: 2025-06-14

## 2024-06-14 NOTE — TELEPHONE ENCOUNTER
Medication requested (name and dose):    gabapentin (NEURONTIN) 300 MG capsule   Take 1 capsule by mouth 3 (Three) Times a Day.,     Pharmacy where request should be sent:      [facility] - [address] - [phone]  - [phone] FX     Additional details provided by patient:      Best call back number:  [phone]    Does the patient have less than a 3 day supply:  [x] Yes  [] No    John Barajas Rep  06/14/24, 12:11 EDT

## 2024-06-25 ENCOUNTER — SPECIALTY PHARMACY (OUTPATIENT)
Dept: ONCOLOGY | Facility: HOSPITAL | Age: 59
End: 2024-06-25
Payer: COMMERCIAL

## 2024-07-08 ENCOUNTER — PATIENT MESSAGE (OUTPATIENT)
Dept: NEUROLOGY | Facility: CLINIC | Age: 59
End: 2024-07-08
Payer: COMMERCIAL

## 2024-07-08 ENCOUNTER — TELEPHONE (OUTPATIENT)
Dept: NEUROLOGY | Facility: CLINIC | Age: 59
End: 2024-07-08
Payer: COMMERCIAL

## 2024-07-08 NOTE — TELEPHONE ENCOUNTER
----- Message from [facility] [employer] sent at 7/8/2024 12:32 PM EDT -----  Regarding: MRI   Contact: [phone]  Hi!   I am following up for new mri schedule and my visit dates.   I wanted to submit, for your information, of my availability for consideration to schedule.   If the dates don’t work, maybe they could call me for alternate dates…?     I will be in on the 24-27th of July. It’s not a big window of time, so I know that is very limiting.   If August is the month that has more availability, I would need to speak with them, as my calendar is not quite set.     I hope this makes sense and less complicated for scheduling. I apologize for the extra effort and time needed!     Thank you,  Kat

## 2024-07-25 ENCOUNTER — OFFICE VISIT (OUTPATIENT)
Dept: NEUROLOGY | Facility: CLINIC | Age: 59
End: 2024-07-25
Payer: COMMERCIAL

## 2024-07-25 VITALS
WEIGHT: 135 LBS | SYSTOLIC BLOOD PRESSURE: 116 MMHG | BODY MASS INDEX: 23.05 KG/M2 | HEART RATE: 55 BPM | OXYGEN SATURATION: 98 % | DIASTOLIC BLOOD PRESSURE: 68 MMHG | HEIGHT: 64 IN

## 2024-07-25 DIAGNOSIS — M79.2 NEUROPATHIC PAIN: ICD-10-CM

## 2024-07-25 DIAGNOSIS — R53.82 CHRONIC FATIGUE: ICD-10-CM

## 2024-07-25 DIAGNOSIS — Z79.899 HIGH RISK MEDICATION USE: ICD-10-CM

## 2024-07-25 DIAGNOSIS — G35 MULTIPLE SCLEROSIS: ICD-10-CM

## 2024-07-25 DIAGNOSIS — F33.9 EPISODE OF RECURRENT MAJOR DEPRESSIVE DISORDER, UNSPECIFIED DEPRESSION EPISODE SEVERITY: Chronic | ICD-10-CM

## 2024-07-25 DIAGNOSIS — R53.82 CHRONIC FATIGUE: Chronic | ICD-10-CM

## 2024-07-25 DIAGNOSIS — G35 MULTIPLE SCLEROSIS: Primary | Chronic | ICD-10-CM

## 2024-07-25 DIAGNOSIS — Z79.899 CONTROLLED SUBSTANCE AGREEMENT SIGNED: ICD-10-CM

## 2024-07-25 DIAGNOSIS — M79.2 NEUROPATHIC PAIN: Chronic | ICD-10-CM

## 2024-07-25 RX ORDER — BUPROPION HYDROCHLORIDE 150 MG/1
150 TABLET ORAL EVERY MORNING
Qty: 90 TABLET | Refills: 3 | Status: SHIPPED | OUTPATIENT
Start: 2024-07-25 | End: 2025-07-25

## 2024-07-25 RX ORDER — PRAMIPEXOLE DIHYDROCHLORIDE 0.25 MG/1
.25-.5 TABLET ORAL NIGHTLY
Qty: 180 TABLET | Refills: 3 | Status: SHIPPED | OUTPATIENT
Start: 2024-07-25 | End: 2025-07-25

## 2024-07-25 RX ORDER — MODAFINIL 200 MG/1
200 TABLET ORAL DAILY PRN
Qty: 90 TABLET | Refills: 2 | Status: SHIPPED | OUTPATIENT
Start: 2024-07-25

## 2024-07-25 RX ORDER — GABAPENTIN 300 MG/1
300 CAPSULE ORAL 3 TIMES DAILY
Qty: 270 CAPSULE | Refills: 1 | Status: SHIPPED | OUTPATIENT
Start: 2024-07-25 | End: 2025-07-25

## 2024-07-25 RX ORDER — BUPROPION HYDROCHLORIDE 300 MG/1
300 TABLET ORAL EVERY MORNING
Qty: 90 TABLET | Refills: 3 | Status: SHIPPED | OUTPATIENT
Start: 2024-07-25 | End: 2025-07-25

## 2024-07-25 NOTE — PROGRESS NOTES
Chief Complaint    Multiple Sclerosis    Subjective        Mckenna Castellano presents to [facility] NEUROLOGY  History of Present Illness    59 y.o. female returns in follow up.  Last visit on 3/1/24 rx Kesimpta, Provigil, Wellbutrin, GBP, ordered labs.      3/1/24 ALT 27, AST 33, hep panel neg     RRMS    MRI Brain/Cervical, my review of films, 7/24/24 as compared to 1/27/23 few scattered T2 lesions, PVWM and medulla without new/enlarging/enhancing lesions, no cord lesion     N/T on left arm and leg.  Sx are 5 out of 7 days.  Moderate intensity.       Walking is unsteady due to numbness.        Neuropathic pain       mg qhs.  Feet/hands worsening N/T     Fatigue      Provigil prn for fatigue helps with fatigue     Fatigue is moderate.  Heat intolerance is moderate.       MDD     Mood needs improvement  on Wellbutrin      Problem history:     Developed bilateral LE numbness.  Then progressed to left leg up to Face and arm.  Left sided increased sensitivity to touch.  Sx lasted for 3 months.  GBP used to improve pain.  No other relapses since first event.       Started on Aubagio in 10/2017.  Noted mild hair loss after starting.  Denies new or worsening sx.  Heat intolerance is moderate.  Fatigue is mild to moderate.  Balance is off and left leg is weaker.  ST memory decreasing.  Bladder frequency and urgency.       Feet have N/T at bedtime.   mg qhs wears off and does not last all night.         Reviewed previous medical records:     Dx with MS in 2005 by Dr Benitez Fraser.  Onset had numbness in both legs, then LE F/A/L N and allodynia.  Treated with interferon for 2 months, N/V.  Switched to Copaxone for 4 -5 years.  Then to Gilenya.  Tolerating Gilenya without noted dz activity.  Moved to [facility] due to low counts.  During switch to Aubagio noted new left medullary lesion with left leg weakness.  Stopped Aubagio due to insurance issues.       MRI Brain - 10/2/17 new area of enhancement in  "right medulla, moderate T2 lesions         Objective   Vital Signs:  /68   Pulse 55   Ht 162.6 cm (64.02\")   Wt 61.2 kg (135 lb)   SpO2 98%   BMI 23.16 kg/m²   Estimated body mass index is 23.16 kg/m² as calculated from the following:    Height as of this encounter: 162.6 cm (64.02\").    Weight as of this encounter: 61.2 kg (135 lb).       BMI is within normal parameters. No other follow-up for BMI required.    Neurologic Exam     Mental Status   Oriented to person, place, and time.   Speech: speech is normal   Level of consciousness: alert  Knowledge: good and consistent with education.   Normal comprehension.     Cranial Nerves   Cranial nerves II through XII intact.     CN II   Visual fields full to confrontation.   Visual acuity: normal  Right visual field deficit: none  Left visual field deficit: none     CN III, IV, VI   Pupils are equal, round, and reactive to light.  Extraocular motions are normal.   Nystagmus: none   Diplopia: none  Ophthalmoparesis: none  Upgaze: normal  Downgaze: normal  Conjugate gaze: present    CN V   Facial sensation intact.   Right corneal reflex: normal  Left corneal reflex: normal    CN VII   Right facial weakness: none  Left facial weakness: none    CN VIII   Hearing: intact    CN IX, X   Palate: symmetric  Right gag reflex: normal  Left gag reflex: normal    CN XI   Right sternocleidomastoid strength: normal  Left sternocleidomastoid strength: normal    CN XII   Tongue: not atrophic  Fasciculations: absent  Tongue deviation: none    Motor Exam   Muscle bulk: normal  Overall muscle tone: normal    Strength   Strength 5/5 throughout.     Sensory Exam   Light touch normal.     Gait, Coordination, and Reflexes     Gait  Gait: normal    Tremor   Resting tremor: absent  Intention tremor: absent  Action tremor: absent    Reflexes   Reflexes 2+ except as noted.        Physical Exam  Eyes:      Extraocular Movements: EOM normal.      Pupils: Pupils are equal, round, and " reactive to light.   Neurological:      Mental Status: She is oriented to person, place, and time.      Cranial Nerves: Cranial nerves 2-12 are intact.      Motor: Motor strength is normal.     Gait: Gait is intact.   Psychiatric:         Speech: Speech normal.        Result Review :    The following data was reviewed by: Surinder Stover MD on 07/25/2024:  Common labs          8/25/2023    10:58 3/1/2024    12:21   Common Labs   Glucose 95  88    BUN 13  18    Creatinine 0.99  1.19    Sodium 142  143    Potassium 4.3  4.5    Chloride 102  105    Calcium 10.0  9.6    Albumin 4.9  4.7    Total Bilirubin 0.3  0.2    Alkaline Phosphatase 108  88    AST (SGOT) 36  33    ALT (SGPT) 46  27    WBC 2.52  2.48    Hemoglobin 13.0  11.6    Hematocrit 39.5  33.9    Platelets 234  202                   Assessment and Plan     Diagnoses and all orders for this visit:    1. Multiple sclerosis (Primary)  Assessment & Plan:  Sx stable on Kesimpta         Orders:  -     modafinil (PROVIGIL) 200 MG tablet; Take 1 tablet by mouth Daily As Needed (fatigue).  Dispense: 90 tablet; Refill: 2    2. Neuropathic pain  Assessment & Plan:  Stable on GBP     Orders:  -     modafinil (PROVIGIL) 200 MG tablet; Take 1 tablet by mouth Daily As Needed (fatigue).  Dispense: 90 tablet; Refill: 2  -     gabapentin (NEURONTIN) 300 MG capsule; Take 1 capsule by mouth 3 (Three) Times a Day.  Dispense: 270 capsule; Refill: 1    3. Chronic fatigue  Assessment & Plan:  Stable on Provigil     Orders:  -     modafinil (PROVIGIL) 200 MG tablet; Take 1 tablet by mouth Daily As Needed (fatigue).  Dispense: 90 tablet; Refill: 2    4. Episode of recurrent major depressive disorder, unspecified depression episode severity    5. Multiple sclerosis  Comments:  Cont Gilenya.   Labs today  MRI next visit  Assessment & Plan:  Sx stable on Kesimpta         Orders:  -     modafinil (PROVIGIL) 200 MG tablet; Take 1 tablet by mouth Daily As Needed (fatigue).  Dispense: 90  tablet; Refill: 2    6. Chronic fatigue  Comments:  Refill provigil  Assessment & Plan:  Stable on Provigil     Orders:  -     modafinil (PROVIGIL) 200 MG tablet; Take 1 tablet by mouth Daily As Needed (fatigue).  Dispense: 90 tablet; Refill: 2    7. Neuropathic pain  Comments:  Lyrica refilled  Assessment & Plan:  Stable on GBP     Orders:  -     modafinil (PROVIGIL) 200 MG tablet; Take 1 tablet by mouth Daily As Needed (fatigue).  Dispense: 90 tablet; Refill: 2  -     gabapentin (NEURONTIN) 300 MG capsule; Take 1 capsule by mouth 3 (Three) Times a Day.  Dispense: 270 capsule; Refill: 1    8. Neuropathic pain  Comments:  Stop Lyrica, start GBP  Assessment & Plan:  Stable on GBP     Orders:  -     modafinil (PROVIGIL) 200 MG tablet; Take 1 tablet by mouth Daily As Needed (fatigue).  Dispense: 90 tablet; Refill: 2  -     gabapentin (NEURONTIN) 300 MG capsule; Take 1 capsule by mouth 3 (Three) Times a Day.  Dispense: 270 capsule; Refill: 1    9. High risk medication use  -     gabapentin (NEURONTIN) 300 MG capsule; Take 1 capsule by mouth 3 (Three) Times a Day.  Dispense: 270 capsule; Refill: 1    10. Controlled substance agreement signed  -     gabapentin (NEURONTIN) 300 MG capsule; Take 1 capsule by mouth 3 (Three) Times a Day.  Dispense: 270 capsule; Refill: 1    Other orders  -     pramipexole (MIRAPEX) 0.25 MG tablet; Take 1-2 tablets by mouth Every Night.  Dispense: 180 tablet; Refill: 3  -     buPROPion XL (Wellbutrin XL) 150 MG 24 hr tablet; Take 1 tablet by mouth Every Morning.  Dispense: 90 tablet; Refill: 3  -     buPROPion XL (Wellbutrin XL) 300 MG 24 hr tablet; Take 1 tablet by mouth Every Morning.  Dispense: 90 tablet; Refill: 3             Follow Up     No follow-ups on file.  Patient was given instructions and counseling regarding her condition or for health maintenance advice. Please see specific information pulled into the AVS if appropriate.

## 2024-09-04 RX ORDER — BUPROPION HYDROCHLORIDE 300 MG/1
300 TABLET ORAL EVERY MORNING
Qty: 90 TABLET | Refills: 3 | Status: SHIPPED | OUTPATIENT
Start: 2024-09-04 | End: 2025-09-04

## 2024-09-04 RX ORDER — BUPROPION HYDROCHLORIDE 150 MG/1
150 TABLET ORAL EVERY MORNING
Qty: 90 TABLET | Refills: 3 | Status: SHIPPED | OUTPATIENT
Start: 2024-09-04 | End: 2025-09-04

## 2024-09-04 NOTE — TELEPHONE ENCOUNTER
Rx Refill Note  Requested Prescriptions     Pending Prescriptions Disp Refills    buPROPion XL (Wellbutrin XL) 150 MG 24 hr tablet 90 tablet 3     Sig: Take 1 tablet by mouth Every Morning.    buPROPion XL (Wellbutrin XL) 300 MG 24 hr tablet 90 tablet 3     Sig: Take 1 tablet by mouth Every Morning.      Last filled:  07/25/24 w/3  Last office visit with prescribing clinician: 7/25/2024      Next office visit with prescribing clinician: 1/30/2025     Marcelle Tang MA  09/04/24, 09:29 EDT

## 2024-09-04 NOTE — TELEPHONE ENCOUNTER
"  Caller: Mckenna Castellano \"Kat\"    Relationship: Self    Best call back number: [phone]    Requested Prescriptions:   Requested Prescriptions     Pending Prescriptions Disp Refills    buPROPion XL (Wellbutrin XL) 150 MG 24 hr tablet 90 tablet 3     Sig: Take 1 tablet by mouth Every Morning.    buPROPion XL (Wellbutrin XL) 300 MG 24 hr tablet 90 tablet 3     Sig: Take 1 tablet by mouth Every Morning.        Pharmacy where request should be sent: [address] - [phone]  - [phone] FX     Last office visit with prescribing clinician: 7/25/2024   Last telemedicine visit with prescribing clinician: Visit date not found   Next office visit with prescribing clinician: 1/30/2025     Additional details provided by patient: RAN OUT THIS MORNING. NEEDS SCRIPT TRANSFERRED TO PHARMACY IN [place].     Does the patient have less than a 3 day supply:  [x] Yes  [] No    Would you like a call back once the refill request has been completed: [] Yes [x] No    If the office needs to give you a call back, can they leave a voicemail: [] Yes [x] No    John Edmonds Rep   09/04/24 09:24 EDT     "

## 2024-10-18 ENCOUNTER — SPECIALTY PHARMACY (OUTPATIENT)
Dept: ONCOLOGY | Facility: HOSPITAL | Age: 59
End: 2024-10-18
Payer: COMMERCIAL

## 2024-10-18 DIAGNOSIS — G35 MULTIPLE SCLEROSIS: Primary | Chronic | ICD-10-CM

## 2024-10-18 NOTE — PROGRESS NOTES
Specialty Pharmacy Patient Management Program  Neurology Reassessment     Mckenna Castellano is a 59 y.o. female with multiple sclerosis seen by a Neurology provider and enrolled in the Neurology Patient Management program offered by [facility].  A follow-up outreach was conducted, including assessment of continued therapy appropriateness, medication adherence, and side effect incidence and management for Kesimpta.     Changes to Insurance Coverage or Financial Support  Rx [employer]    Relevant Past Medical History and Comorbidities  Relevant medical history and concomitant health conditions were discussed with the patient. The patient's chart has been reviewed for relevant past medical history and comorbid health conditions and updated as necessary.   Past Medical History:   Diagnosis Date    Anxiety     Depression     Hyperlipidemia     Migraine     MS (multiple sclerosis)      Social History     Socioeconomic History    Marital status:    Tobacco Use    Smoking status: Never     Passive exposure: Never    Smokeless tobacco: Never   Vaping Use    Vaping status: Never Used   Substance and Sexual Activity    Alcohol use: Yes     Alcohol/week: 4.0 - 7.0 standard drinks of alcohol     Types: 2 - 3 Glasses of wine, 1 - 3 Cans of beer, 1 Drinks containing 0.5 oz of alcohol per week     Comment: Sometimes less- “lite” beer    Drug use: No    Sexual activity: Yes     Partners: Male     Birth control/protection: Post-menopausal, None, Hysterectomy     Comment: Partial hysterectomy     Problem list reviewed by Talisha Acosta, PharmD on 10/18/2024 at 11:30 AM    Hospitalizations and Urgent Care Since Last Assessment  ED Visits, Admissions, or Hospitalizations: none   Urgent Office Visits: none     Allergies  Known allergies and reactions were discussed with the patient. The patient's chart has been reviewed for allergy information and updated as necessary.   Allergies   Allergen Reactions     Ciprofloxacin Hives    Zyrtec [Cetirizine] Unknown - Low Severity     Patient does not prefer to take; can tolerate allegra, claritin and benadryl     Allergies reviewed by Talisha Acosta, PharmD on 10/18/2024 at 11:29 AM    Relevant Laboratory Values  Common labs          3/1/2024    12:21   Common Labs   Glucose 88    BUN 18    Creatinine 1.19    Sodium 143    Potassium 4.5    Chloride 105    Calcium 9.6    Albumin 4.7    Total Bilirubin 0.2    Alkaline Phosphatase 88    AST (SGOT) 33    ALT (SGPT) 27    WBC 2.48    Hemoglobin 11.6    Hematocrit 33.9    Platelets 202        Lab Assessment  The above labs have been reviewed. No dose adjustments are needed for the specialty medication(s) based on the labs.     Current Medication List  This medication list has been reviewed with the patient and evaluated for any interactions or necessary modifications/recommendations, and updated to include all prescription medications, OTC medications, and supplements the patient is currently taking.  This list reflects what is contained in the patient's profile, which has also been marked as reviewed to communicate to other providers it is the most up to date version of the patient's current medication therapy.     Current Outpatient Medications:     buPROPion XL (Wellbutrin XL) 150 MG 24 hr tablet, Take 1 tablet by mouth Every Morning., Disp: 90 tablet, Rfl: 3    buPROPion XL (Wellbutrin XL) 300 MG 24 hr tablet, Take 1 tablet by mouth Every Morning., Disp: 90 tablet, Rfl: 3    gabapentin (NEURONTIN) 300 MG capsule, Take 1 capsule by mouth 3 (Three) Times a Day., Disp: 270 capsule, Rfl: 1    modafinil (PROVIGIL) 200 MG tablet, Take 1 tablet by mouth Daily As Needed (fatigue)., Disp: 90 tablet, Rfl: 2    Ofatumumab 20 MG/0.4ML solution auto-injector, Inject 20 mg under the skin into the appropriate area as directed Every 28 (Twenty-Eight) Days. Pt received loading dose in the providers office, Disp: 1.2 mL, Rfl: 3    ondansetron  (Zofran) 4 MG tablet, Take 1 tablet by mouth Every 12 (Twelve) Hours As Needed for Nausea or Vomiting., Disp: 60 tablet, Rfl: 5    pramipexole (MIRAPEX) 0.25 MG tablet, Take 1-2 tablets by mouth Every Night., Disp: 180 tablet, Rfl: 3    Medicines reviewed by Talisha Acosta, PharmD on 10/18/2024 at 11:29 AM    Drug Interactions  No relevant drug-drug interactions with specialty medication(s):  Kesimpta.        Adverse Drug Reactions  Medication tolerability: Tolerating with no to minimal ADRs          Medication plan: Continue therapy with normal follow-up  Plan for ADR Management: not required      Adherence, Self-Administration, and Current Therapy Problems  Adherence related patient's specialty therapy was discussed with the patient. The Adherence segment of this outreach has been reviewed and updated.   Adherence Questions  Linked Medication(s) Assessed: Ofatumumab  On average, how many doses/injections does the patient miss per month?: 0  What are the identified reasons for non-adherence or missed doses? : no problems identified  What is the estimated medication adherence level?: %  Based on the patient/caregiver response and refill history, does this patient require an MTP to track adherence improvements?: no    Additional Barriers to Patient Self-Administration: none  Methods for Supporting Patient Self-Administration: pt adept with Kesimpta self-injections    Recently Close Medication Therapy Problems  No medication therapy recommendations to display  Open Medication Therapy Problems  No medication therapy recommendations to display     Goals of Therapy  Goals related to the patient's specialty therapy was discussed with the patient. The Patient Goals segment of this outreach has been reviewed and updated.    Goals Addressed Today        Specialty Pharmacy General Goal       Reduce the number of relapses, delay progression of disability, and limit new disease activity (as seen on MRI).  10/18/24 dw -  pt reports no signs/sx of relapsed ms, provider note 7/25/24 MRI Brain/Cervical, my review of films, 7/24/24 as compared to 1/27/23 few scattered T2 lesions, PVWM and medulla without new/enlarging/enhancing lesions, no cord lesion                  Quality of Life Assessment   Quality of Life related to the patient's enrollment in the patient management program and services provided was discussed with the patient. The QOL segment of this outreach has been reviewed and updated.   Quality of Life Improvement Scale: 8-Moderately better    Reassessment Plan & Follow-Up  Medication Therapy Changes: Continue Kesimpta 20 mg subq month - filling at [facility]  Related Plans, Therapy Recommendations, or Issues to Be Addressed: f/u labs results next week - pt to have drawn today.  Pharmacist to perform regular reassessments no more than (6) months from the previous assessment.  Care Coordinator to set up future refill outreaches, coordinate prescription delivery, and escalate clinical questions to pharmacist.     Attestation  Therapeutic appropriateness: Appropriate  I attest the patient was actively involved in and has agreed to the above plan of care. If the prescribed therapy is at any point deemed not appropriate based on the current or future assessments, a consultation will be initiated with the patient's specialty care provider to determine the best course of action. The revised plan of therapy will be documented along with any additional patient education provided. Discussed aforementioned material with patient by phone.    Talisha Acosta, PharmD, [facility]  Clinic Specialty Pharmacist, Neurology  10/18/2024  11:31 EDT

## 2024-11-07 ENCOUNTER — TELEPHONE (OUTPATIENT)
Dept: NEUROLOGY | Facility: CLINIC | Age: 59
End: 2024-11-07
Payer: COMMERCIAL

## 2024-11-07 NOTE — TELEPHONE ENCOUNTER
Called patient to let her know to hold Kesimpta until symptoms resolve. Patient verbalized understanding.

## 2024-11-07 NOTE — TELEPHONE ENCOUNTER
"Caller: Mckenna Castellano \"Kat\"    Relationship: Self    Best call back number: [phone]    Which medication are you concerned about: KESIMPTA    Who prescribed you this medication: DR RIVERO    When did you start taking this medication: SINCE MARCH    What are your concerns: PATIENT HAS AN UPPER RESPIRATORY INFECTION AND SAID SHE'S NOT SURE IF SHE CAN PROCEED WITH THE KESIMPTA INJECTION TOMORROW WHILE SHE HAS THIS INFECTION. PLEASE ADVISE.    How long have you had these concerns: SINCE MONDAY    "

## 2024-12-16 DIAGNOSIS — Z79.899 CONTROLLED SUBSTANCE AGREEMENT SIGNED: ICD-10-CM

## 2024-12-16 DIAGNOSIS — M79.2 NEUROPATHIC PAIN: ICD-10-CM

## 2024-12-16 DIAGNOSIS — Z79.899 HIGH RISK MEDICATION USE: ICD-10-CM

## 2024-12-16 DIAGNOSIS — R53.82 CHRONIC FATIGUE: ICD-10-CM

## 2024-12-16 DIAGNOSIS — G35 MULTIPLE SCLEROSIS: ICD-10-CM

## 2024-12-16 DIAGNOSIS — M79.2 NEUROPATHIC PAIN: Chronic | ICD-10-CM

## 2024-12-16 RX ORDER — MODAFINIL 200 MG/1
200 TABLET ORAL DAILY PRN
Qty: 90 TABLET | Refills: 1 | Status: SHIPPED | OUTPATIENT
Start: 2024-12-16

## 2024-12-16 RX ORDER — GABAPENTIN 300 MG/1
300 CAPSULE ORAL 3 TIMES DAILY
Qty: 270 CAPSULE | Refills: 1 | Status: SHIPPED | OUTPATIENT
Start: 2024-12-16 | End: 2025-12-16

## 2024-12-16 NOTE — TELEPHONE ENCOUNTER
"Provider: TERESA RIVERO MD    Caller: Mckenna Castellano \"Kat\"    Relationship to Patient: Self    Pharmacy: [facility]     Phone Number: [phone]    Reason for Call: CALLING TO HAVE PROVIDER CALL [place] [facility] AND HAVE THEM TRANSFER THE PRESCRIPTION FOR GABAPENTIN AND MODAFINIL TO HER PHARMACY IN [place].   STATED IT IS [facility] AND PHONE NUMBER [phone].   STATED SHE HAS ENOUGH FOR TONIGHT.    When was the patient last seen: 7-25-24    PLEASE ADVISE       "

## 2025-01-30 ENCOUNTER — OFFICE VISIT (OUTPATIENT)
Dept: NEUROLOGY | Facility: CLINIC | Age: 60
End: 2025-01-30
Payer: COMMERCIAL

## 2025-01-30 VITALS
HEIGHT: 64 IN | HEART RATE: 60 BPM | WEIGHT: 140.2 LBS | DIASTOLIC BLOOD PRESSURE: 74 MMHG | OXYGEN SATURATION: 99 % | SYSTOLIC BLOOD PRESSURE: 120 MMHG | BODY MASS INDEX: 23.93 KG/M2

## 2025-01-30 DIAGNOSIS — G35 MULTIPLE SCLEROSIS: Primary | Chronic | ICD-10-CM

## 2025-01-30 DIAGNOSIS — M79.2 NEUROPATHIC PAIN: Chronic | ICD-10-CM

## 2025-01-30 DIAGNOSIS — M79.2 NEUROPATHIC PAIN: ICD-10-CM

## 2025-01-30 DIAGNOSIS — Z79.899 HIGH RISK MEDICATION USE: ICD-10-CM

## 2025-01-30 DIAGNOSIS — G35 MULTIPLE SCLEROSIS: ICD-10-CM

## 2025-01-30 DIAGNOSIS — Z79.899 CONTROLLED SUBSTANCE AGREEMENT SIGNED: ICD-10-CM

## 2025-01-30 DIAGNOSIS — F33.9 EPISODE OF RECURRENT MAJOR DEPRESSIVE DISORDER, UNSPECIFIED DEPRESSION EPISODE SEVERITY: Chronic | ICD-10-CM

## 2025-01-30 DIAGNOSIS — R53.82 CHRONIC FATIGUE: ICD-10-CM

## 2025-01-30 DIAGNOSIS — G25.81 RESTLESS LEGS SYNDROME (RLS): ICD-10-CM

## 2025-01-30 PROCEDURE — 99214 OFFICE O/P EST MOD 30 MIN: CPT | Performed by: PSYCHIATRY & NEUROLOGY

## 2025-01-30 RX ORDER — MODAFINIL 200 MG/1
200 TABLET ORAL DAILY PRN
Qty: 90 TABLET | Refills: 1 | Status: SHIPPED | OUTPATIENT
Start: 2025-01-30

## 2025-01-30 RX ORDER — PRAMIPEXOLE DIHYDROCHLORIDE 0.25 MG/1
.25-.5 TABLET ORAL NIGHTLY
Qty: 180 TABLET | Refills: 3 | Status: SHIPPED | OUTPATIENT
Start: 2025-01-30 | End: 2026-01-30

## 2025-01-30 RX ORDER — BUPROPION HYDROCHLORIDE 150 MG/1
150 TABLET ORAL EVERY MORNING
Qty: 90 TABLET | Refills: 3 | Status: SHIPPED | OUTPATIENT
Start: 2025-01-30 | End: 2026-01-30

## 2025-01-30 RX ORDER — BUPROPION HYDROCHLORIDE 300 MG/1
300 TABLET ORAL EVERY MORNING
Qty: 90 TABLET | Refills: 3 | Status: SHIPPED | OUTPATIENT
Start: 2025-01-30 | End: 2026-01-30

## 2025-01-30 RX ORDER — GABAPENTIN 300 MG/1
300 CAPSULE ORAL 3 TIMES DAILY
Qty: 270 CAPSULE | Refills: 1 | Status: SHIPPED | OUTPATIENT
Start: 2025-01-30 | End: 2026-01-30

## 2025-01-30 NOTE — PROGRESS NOTES
Chief Complaint    Multiple Sclerosis    Subjective        Mckenna Castellano presents to [facility] NEUROLOGY  History of Present Illness    60 y.o. female returns in follow up.  Last visit on 7/25/24 continued Kesimpta, Provigil, Wellbutrin, GBP, ordered labs.      3/1/24 ALT 27, AST 33, hep panel neg     RRMS     MRI Brain/Cervical, 7/24/24 as compared to 1/27/23 few scattered T2 lesions, PVWM and medulla without new/enlarging/enhancing lesions, no cord lesion     N/T on left arm and leg.  Sx are 5 out of 7 days.  Moderate intensity.       Walking is unsteady due to numbness.        Neuropathic pain       mg qhs.  Feet/hands worsening N/T     Fatigue      Provigil prn for fatigue helps with fatigue     Fatigue is moderate.  Heat intolerance is moderate.       MDD     Mood needs improvement  on Wellbutrin      Problem history:     Developed bilateral LE numbness.  Then progressed to left leg up to Face and arm.  Left sided increased sensitivity to touch.  Sx lasted for 3 months.  GBP used to improve pain.  No other relapses since first event.       Started on Aubagio in 10/2017.  Noted mild hair loss after starting.  Denies new or worsening sx.  Heat intolerance is moderate.  Fatigue is mild to moderate.  Balance is off and left leg is weaker.  ST memory decreasing.  Bladder frequency and urgency.       Feet have N/T at bedtime.   mg qhs wears off and does not last all night.         Reviewed previous medical records:     Dx with MS in 2005 by Dr Benitez Fraser.  Onset had numbness in both legs, then LE F/A/L N and allodynia.  Treated with interferon for 2 months, N/V.  Switched to Copaxone for 4 -5 years.  Then to Gilenya.  Tolerating Gilenya without noted dz activity.  Moved to [facility] due to low counts.  During switch to Aubagio noted new left medullary lesion with left leg weakness.  Stopped Aubagio due to insurance issues.       MRI Brain - 10/2/17 new area of enhancement in right  "medulla, moderate T2 lesions         Objective   Vital Signs:  /74   Pulse 60   Ht 162.6 cm (64.02\")   Wt 63.6 kg (140 lb 3.2 oz)   SpO2 99%   BMI 24.05 kg/m²   Estimated body mass index is 24.05 kg/m² as calculated from the following:    Height as of this encounter: 162.6 cm (64.02\").    Weight as of this encounter: 63.6 kg (140 lb 3.2 oz).    BMI is within normal parameters. No other follow-up for BMI required.    Neurological Exam  Mental Status  Awake, alert and oriented to person, place and time. Oriented to person, place and time. Speech is normal. Language is fluent with no aphasia. Attention and concentration are normal. Fund of knowledge is appropriate for level of education.    Cranial Nerves  CN III, IV, VI: Extraocular movements intact bilaterally. Pupils equal round and reactive to light bilaterally.  CN V: Facial sensation is normal.  CN VII: Full and symmetric facial movement.  CN IX, X: Palate elevates symmetrically  CN XI: Shoulder shrug strength is normal.  CN XII: Tongue midline without atrophy or fasciculations.    Motor   Strength is 5/5 throughout all four extremities.    Sensory  Sensation is intact to light touch, pinprick, vibration and proprioception in all four extremities.    Reflexes  Deep tendon reflexes are 2+ and symmetric in all four extremities.    Coordination    Finger-to-nose, rapid alternating movements and heel-to-shin normal bilaterally without dysmetria.    Gait  Normal casual, toe, heel and tandem gait.       Physical Exam  Eyes:      Extraocular Movements: Extraocular movements intact.      Pupils: Pupils are equal, round, and reactive to light.   Neurological:      Motor: Motor strength is normal.     Coordination: Coordination is intact.      Deep Tendon Reflexes: Reflexes are normal and symmetric.   Psychiatric:         Speech: Speech normal.        Result Review :  The following data was reviewed by: Surinder Stover MD on 01/30/2025:  Common labs          " 3/1/2024    12:21   Common Labs   Glucose 88    BUN 18    Creatinine 1.19    Sodium 143    Potassium 4.5    Chloride 105    Calcium 9.6    Albumin 4.7    Total Bilirubin 0.2    Alkaline Phosphatase 88    AST (SGOT) 33    ALT (SGPT) 27    WBC 2.48    Hemoglobin 11.6    Hematocrit 33.9    Platelets 202                Assessment and Plan   Diagnoses and all orders for this visit:    1. Multiple sclerosis (Primary)  Assessment & Plan:  MS stable on DMT    Continue Kesimpta 20 mg sq q 28 days     Monitor for infections     Provigil 200 mg qam       Orders:  -     modafinil (PROVIGIL) 200 MG tablet; Take 1 tablet by mouth Daily As Needed (fatigue).  Dispense: 90 tablet; Refill: 1    2. Episode of recurrent major depressive disorder, unspecified depression episode severity  Assessment & Plan:  Mood is stable    Wellbutrin  mg qam       3. Multiple sclerosis  Comments:  Cont Gilenya.   Labs today  MRI next visit  Assessment & Plan:  MS stable on DMT    Continue Kesimpta 20 mg sq q 28 days     Monitor for infections     Provigil 200 mg qam       Orders:  -     modafinil (PROVIGIL) 200 MG tablet; Take 1 tablet by mouth Daily As Needed (fatigue).  Dispense: 90 tablet; Refill: 1    4. Chronic fatigue  Comments:  Refill provigil  Orders:  -     modafinil (PROVIGIL) 200 MG tablet; Take 1 tablet by mouth Daily As Needed (fatigue).  Dispense: 90 tablet; Refill: 1    5. Neuropathic pain  Comments:  Lyrica refilled  Orders:  -     modafinil (PROVIGIL) 200 MG tablet; Take 1 tablet by mouth Daily As Needed (fatigue).  Dispense: 90 tablet; Refill: 1  -     gabapentin (NEURONTIN) 300 MG capsule; Take 1 capsule by mouth 3 (Three) Times a Day.  Dispense: 270 capsule; Refill: 1    6. Neuropathic pain  Comments:  Stop Lyrica, start GBP  Orders:  -     modafinil (PROVIGIL) 200 MG tablet; Take 1 tablet by mouth Daily As Needed (fatigue).  Dispense: 90 tablet; Refill: 1  -     gabapentin (NEURONTIN) 300 MG capsule; Take 1 capsule by mouth  3 (Three) Times a Day.  Dispense: 270 capsule; Refill: 1    7. High risk medication use  -     gabapentin (NEURONTIN) 300 MG capsule; Take 1 capsule by mouth 3 (Three) Times a Day.  Dispense: 270 capsule; Refill: 1    8. Controlled substance agreement signed  -     gabapentin (NEURONTIN) 300 MG capsule; Take 1 capsule by mouth 3 (Three) Times a Day.  Dispense: 270 capsule; Refill: 1    9. Restless legs syndrome (RLS)  Assessment & Plan:  Stable on Mirapex 0.25 - 0.5 mg qhs       Other orders  -     pramipexole (MIRAPEX) 0.25 MG tablet; Take 1-2 tablets by mouth Every Night.  Dispense: 180 tablet; Refill: 3  -     buPROPion XL (Wellbutrin XL) 150 MG 24 hr tablet; Take 1 tablet by mouth Every Morning.  Dispense: 90 tablet; Refill: 3  -     buPROPion XL (Wellbutrin XL) 300 MG 24 hr tablet; Take 1 tablet by mouth Every Morning.  Dispense: 90 tablet; Refill: 3             Follow Up   No follow-ups on file.  Patient was given instructions and counseling regarding her condition or for health maintenance advice. Please see specific information pulled into the AVS if appropriate.

## 2025-01-30 NOTE — ASSESSMENT & PLAN NOTE
MS stable on DMT    Continue Kesimpta 20 mg sq q 28 days     Monitor for infections     Provigil 200 mg qam

## 2025-02-28 ENCOUNTER — SPECIALTY PHARMACY (OUTPATIENT)
Dept: ONCOLOGY | Facility: HOSPITAL | Age: 60
End: 2025-02-28
Payer: COMMERCIAL

## 2025-02-28 NOTE — PROGRESS NOTES
Specialty Pharmacy Patient Management Program  Neurology Reassessment     Mckenna Castellano is a 60 y.o. female with multiple sclerosis seen by a Neurology provider and enrolled in the Neurology Patient Management program offered by [facility].  A follow-up outreach was conducted, including assessment of continued therapy appropriateness, medication adherence, and side effect incidence and management for Kesimpta filling at [facility].     Changes to Insurance Coverage or Financial Support  Rx [employer]     Relevant Past Medical History and Comorbidities  Relevant medical history and concomitant health conditions were discussed with the patient. The patient's chart has been reviewed for relevant past medical history and comorbid health conditions and updated as necessary.   Past Medical History:   Diagnosis Date    Anxiety     Depression     Hyperlipidemia     Migraine     MS (multiple sclerosis)      Social History     Socioeconomic History    Marital status:    Tobacco Use    Smoking status: Never     Passive exposure: Never    Smokeless tobacco: Never   Vaping Use    Vaping status: Never Used   Substance and Sexual Activity    Alcohol use: Yes     Alcohol/week: 9.0 - 10.0 standard drinks of alcohol     Types: 2 - 3 Glasses of wine, 6 Cans of beer, 1 Drinks containing 0.5 oz of alcohol per week     Comment: Sometimes less- “lite” beer    Drug use: No    Sexual activity: Yes     Partners: Male     Birth control/protection: Post-menopausal, None, Hysterectomy     Comment: Partial hysterectomy     Problem list reviewed by Talisha Acosta, PharmD on 2/28/2025 at 11:18 AM    Hospitalizations and Urgent Care Since Last Assessment  ED Visits, Admissions, or Hospitalizations: none   Urgent Office Visits: none     Allergies  Known allergies and reactions were discussed with the patient. The patient's chart has been reviewed for allergy information and updated as necessary.   Allergies    Allergen Reactions    Ciprofloxacin Hives    Zyrtec [Cetirizine] Unknown - Low Severity     Patient does not prefer to take; can tolerate allegra, claritin and benadryl     Allergies reviewed by Talisha Acosta, PharmD on 2/28/2025 at 11:18 AM    Relevant Laboratory Values      Lab Assessment  Pt to have labs drawn in April when she in back in [place].    Current Medication List  This medication list has been reviewed with the patient and evaluated for any interactions or necessary modifications/recommendations, and updated to include all prescription medications, OTC medications, and supplements the patient is currently taking.  This list reflects what is contained in the patient's profile, which has also been marked as reviewed to communicate to other providers it is the most up to date version of the patient's current medication therapy.     Current Outpatient Medications:     buPROPion XL (Wellbutrin XL) 150 MG 24 hr tablet, Take 1 tablet by mouth Every Morning., Disp: 90 tablet, Rfl: 3    buPROPion XL (Wellbutrin XL) 300 MG 24 hr tablet, Take 1 tablet by mouth Every Morning., Disp: 90 tablet, Rfl: 3    gabapentin (NEURONTIN) 300 MG capsule, Take 1 capsule by mouth 3 (Three) Times a Day., Disp: 270 capsule, Rfl: 1    modafinil (PROVIGIL) 200 MG tablet, Take 1 tablet by mouth Daily As Needed (fatigue)., Disp: 90 tablet, Rfl: 1    Ofatumumab 20 MG/0.4ML solution auto-injector, Inject 20 mg under the skin into the appropriate area as directed Every 28 (Twenty-Eight) Days. Pt received loading dose in the providers office, Disp: 1.2 mL, Rfl: 3    ondansetron (Zofran) 4 MG tablet, Take 1 tablet by mouth Every 12 (Twelve) Hours As Needed for Nausea or Vomiting., Disp: 60 tablet, Rfl: 5    pramipexole (MIRAPEX) 0.25 MG tablet, Take 1-2 tablets by mouth Every Night., Disp: 180 tablet, Rfl: 3    Medicines reviewed by Talisha Acosta, PharmD on 2/28/2025 at 11:18 AM    Drug Interactions  No relevant drug-drug  interactions with specialty medication(s):  Kesimpta.        Adverse Drug Reactions  Medication tolerability: Tolerating with no to minimal ADRs          Medication plan: Continue therapy with normal follow-up  Plan for ADR Management: not requried      Adherence, Self-Administration, and Current Therapy Problems  Adherence related patient's specialty therapy was discussed with the patient. The Adherence segment of this outreach has been reviewed and updated.   Adherence Questions  Linked Medication(s) Assessed: Ofatumumab  On average, how many doses/injections does the patient miss per month?: 0  What are the identified reasons for non-adherence or missed doses? : no problems identified  What is the estimated medication adherence level?: %  Based on the patient/caregiver response and refill history, does this patient require an MTP to track adherence improvements?: no    Additional Barriers to Patient Self-Administration: none  Methods for Supporting Patient Self-Administration: pt adept with Kesimpta self-injections    Recently Close Medication Therapy Problems  No medication therapy recommendations to display  Open Medication Therapy Problems  No medication therapy recommendations to display     Goals of Therapy  Goals related to the patient's specialty therapy was discussed with the patient. The Patient Goals segment of this outreach has been reviewed and updated.    Goals Addressed Today        Specialty Pharmacy General Goal       Reduce the number of relapses, delay progression of disability, and limit new disease activity (as seen on MRI).  10/18/24 dw - pt reports no signs/sx of relapsed ms, provider note 7/25/24 MRI Brain/Cervical, my review of films, 7/24/24 as compared to 1/27/23 few scattered T2 lesions, PVWM and medulla without new/enlarging/enhancing lesions, no cord lesion   2/28/25 dw - pt reports no sign/sx of relapsed MS                 Quality of Life Assessment   Quality of Life related to  the patient's enrollment in the patient management program and services provided was discussed with the patient. The QOL segment of this outreach has been reviewed and updated.   Quality of Life Improvement Scale: 8-Moderately better    Reassessment Plan & Follow-Up  Medication Therapy Changes: Continue Kesimpta 20 mg subq monthly  Related Plans, Therapy Recommendations, or Issues to Be Addressed: f/u labs in April.  Pharmacist to perform regular reassessments no more than (6) months from the previous assessment.  Care Coordinator to set up future refill outreaches, coordinate prescription delivery, and escalate clinical questions to pharmacist.     Attestation  Therapeutic appropriateness: Appropriate  I attest the patient was actively involved in and has agreed to the above plan of care. If the prescribed therapy is at any point deemed not appropriate based on the current or future assessments, a consultation will be initiated with the patient's specialty care provider to determine the best course of action. The revised plan of therapy will be documented along with any additional patient education provided. Discussed aforementioned material with patient by phone.    Rodrigo GodoyD, [facility]  Clinic Specialty Pharmacist, Neurology  2/28/2025  12:26 EST

## 2025-04-08 ENCOUNTER — SPECIALTY PHARMACY (OUTPATIENT)
Dept: ONCOLOGY | Facility: HOSPITAL | Age: 60
End: 2025-04-08
Payer: COMMERCIAL

## 2025-04-17 ENCOUNTER — SPECIALTY PHARMACY (OUTPATIENT)
Dept: ONCOLOGY | Facility: HOSPITAL | Age: 60
End: 2025-04-17
Payer: COMMERCIAL

## 2025-04-29 RX ORDER — ONDANSETRON 4 MG/1
4 TABLET, FILM COATED ORAL EVERY 12 HOURS PRN
Qty: 60 TABLET | Refills: 6 | Status: SHIPPED | OUTPATIENT
Start: 2025-04-29

## 2025-04-29 NOTE — TELEPHONE ENCOUNTER
Rx Refill Note  Requested Prescriptions     Pending Prescriptions Disp Refills    ondansetron (ZOFRAN) 4 MG tablet [Pharmacy Med Name: ONDANSETRON 4MG TAB 4 Tablet] 60 tablet 6     Sig: TAKE 1 TABLET BY MOUTH EVERY 12 (TWELVE) HOURS AS NEEDED FOR NAUSEA OR VOMITING.      Last filled: 03/01/2024 60 with 5 refills   Last office visit with prescribing clinician: 1/30/2025      Next office visit with prescribing clinician: 7/30/2025     Racquel Cruz MA  04/29/25, 14:28 EDT

## 2025-05-06 ENCOUNTER — TELEPHONE (OUTPATIENT)
Dept: NEUROLOGY | Facility: CLINIC | Age: 60
End: 2025-05-06
Payer: COMMERCIAL

## 2025-05-06 NOTE — TELEPHONE ENCOUNTER
With Provider: TERESA RIVERO [[facility]]  Preferred Date Range: Any  Preferred Times: Any  Reason for Visit: Follow-up  Comments: I’m in [place] now and I know I need to have bloodwork done. Can I go to a lab here? [facility] or [place]. They do have a lab laurie

## 2025-06-14 LAB
ALBUMIN SERPL-MCNC: 4.2 G/DL (ref 3.9–4.9)
ALP SERPL-CCNC: 108 IU/L (ref 44–121)
ALT SERPL-CCNC: 15 IU/L (ref 0–32)
AST SERPL-CCNC: 19 IU/L (ref 0–40)
BASOPHILS # BLD AUTO: 0 X10E3/UL (ref 0–0.2)
BASOPHILS NFR BLD AUTO: 1 %
BILIRUB SERPL-MCNC: <0.2 MG/DL (ref 0–1.2)
BUN SERPL-MCNC: 22 MG/DL (ref 8–27)
BUN/CREAT SERPL: 24 (ref 12–28)
CALCIUM SERPL-MCNC: 9.4 MG/DL (ref 8.7–10.3)
CHLORIDE SERPL-SCNC: 102 MMOL/L (ref 96–106)
CO2 SERPL-SCNC: 24 MMOL/L (ref 20–29)
CREAT SERPL-MCNC: 0.91 MG/DL (ref 0.57–1)
EGFRCR SERPLBLD CKD-EPI 2021: 72 ML/MIN/1.73
EOSINOPHIL # BLD AUTO: 0.1 X10E3/UL (ref 0–0.4)
EOSINOPHIL NFR BLD AUTO: 3 %
ERYTHROCYTE [DISTWIDTH] IN BLOOD BY AUTOMATED COUNT: 12 % (ref 11.7–15.4)
GLOBULIN SER CALC-MCNC: 1.8 G/DL (ref 1.5–4.5)
GLUCOSE SERPL-MCNC: 100 MG/DL (ref 70–99)
HCT VFR BLD AUTO: 37.6 % (ref 34–46.6)
HGB BLD-MCNC: 11.8 G/DL (ref 11.1–15.9)
IMM GRANULOCYTES # BLD AUTO: 0 X10E3/UL (ref 0–0.1)
IMM GRANULOCYTES NFR BLD AUTO: 0 %
LYMPHOCYTES # BLD AUTO: 0.8 X10E3/UL (ref 0.7–3.1)
LYMPHOCYTES NFR BLD AUTO: 25 %
MCH RBC QN AUTO: 31.3 PG (ref 26.6–33)
MCHC RBC AUTO-ENTMCNC: 31.4 G/DL (ref 31.5–35.7)
MCV RBC AUTO: 100 FL (ref 79–97)
MONOCYTES # BLD AUTO: 0.4 X10E3/UL (ref 0.1–0.9)
MONOCYTES NFR BLD AUTO: 13 %
NEUTROPHILS # BLD AUTO: 1.9 X10E3/UL (ref 1.4–7)
NEUTROPHILS NFR BLD AUTO: 58 %
PLATELET # BLD AUTO: 219 X10E3/UL (ref 150–450)
POTASSIUM SERPL-SCNC: 5 MMOL/L (ref 3.5–5.2)
PROT SERPL-MCNC: 6 G/DL (ref 6–8.5)
RBC # BLD AUTO: 3.77 X10E6/UL (ref 3.77–5.28)
SODIUM SERPL-SCNC: 139 MMOL/L (ref 134–144)
WBC # BLD AUTO: 3.3 X10E3/UL (ref 3.4–10.8)

## 2025-07-29 DIAGNOSIS — R53.82 CHRONIC FATIGUE: ICD-10-CM

## 2025-07-29 DIAGNOSIS — M79.2 NEUROPATHIC PAIN: ICD-10-CM

## 2025-07-29 DIAGNOSIS — G35 MULTIPLE SCLEROSIS: ICD-10-CM

## 2025-07-30 ENCOUNTER — OFFICE VISIT (OUTPATIENT)
Dept: NEUROLOGY | Facility: CLINIC | Age: 60
End: 2025-07-30
Payer: COMMERCIAL

## 2025-07-30 VITALS
HEIGHT: 64 IN | HEART RATE: 61 BPM | BODY MASS INDEX: 23.49 KG/M2 | OXYGEN SATURATION: 97 % | DIASTOLIC BLOOD PRESSURE: 68 MMHG | WEIGHT: 137.6 LBS | SYSTOLIC BLOOD PRESSURE: 108 MMHG

## 2025-07-30 DIAGNOSIS — Z79.899 HIGH RISK MEDICATION USE: ICD-10-CM

## 2025-07-30 DIAGNOSIS — R53.82 CHRONIC FATIGUE: ICD-10-CM

## 2025-07-30 DIAGNOSIS — M79.2 NEUROPATHIC PAIN: Chronic | ICD-10-CM

## 2025-07-30 DIAGNOSIS — F33.9 EPISODE OF RECURRENT MAJOR DEPRESSIVE DISORDER, UNSPECIFIED DEPRESSION EPISODE SEVERITY: Chronic | ICD-10-CM

## 2025-07-30 DIAGNOSIS — G35 MULTIPLE SCLEROSIS: ICD-10-CM

## 2025-07-30 DIAGNOSIS — G25.81 RESTLESS LEGS SYNDROME (RLS): ICD-10-CM

## 2025-07-30 DIAGNOSIS — Z79.899 CONTROLLED SUBSTANCE AGREEMENT SIGNED: ICD-10-CM

## 2025-07-30 DIAGNOSIS — M79.2 NEUROPATHIC PAIN: ICD-10-CM

## 2025-07-30 DIAGNOSIS — G35 MULTIPLE SCLEROSIS: Primary | Chronic | ICD-10-CM

## 2025-07-30 RX ORDER — MODAFINIL 200 MG/1
200 TABLET ORAL DAILY PRN
Qty: 90 TABLET | Refills: 1 | OUTPATIENT
Start: 2025-07-30

## 2025-07-30 RX ORDER — GABAPENTIN 300 MG/1
300 CAPSULE ORAL 3 TIMES DAILY
Qty: 270 CAPSULE | Refills: 1 | Status: SHIPPED | OUTPATIENT
Start: 2025-07-30 | End: 2026-07-30

## 2025-07-30 RX ORDER — BUPROPION HYDROCHLORIDE 150 MG/1
150 TABLET ORAL EVERY MORNING
Qty: 90 TABLET | Refills: 3 | Status: SHIPPED | OUTPATIENT
Start: 2025-07-30 | End: 2026-07-30

## 2025-07-30 RX ORDER — BUPROPION HYDROCHLORIDE 300 MG/1
300 TABLET ORAL EVERY MORNING
Qty: 90 TABLET | Refills: 3 | Status: SHIPPED | OUTPATIENT
Start: 2025-07-30 | End: 2026-07-30

## 2025-07-30 RX ORDER — MODAFINIL 200 MG/1
200 TABLET ORAL DAILY PRN
Qty: 90 TABLET | Refills: 1 | Status: SHIPPED | OUTPATIENT
Start: 2025-07-30

## 2025-07-30 NOTE — PROGRESS NOTES
Chief Complaint    Multiple Sclerosis (Needs provigil refilled)    Subjective        Mckenna Castellano presents to [facility] NEUROLOGY  History of Present Illness    60 y.o. female returns in follow up.  Last visit on 1/30/25 continued Kesimpta, Provigil, Wellbutrin, GBP, ordered labs.      3/1/24 ALT 27, AST 33, hep panel neg     RRMS     No new or worsening sx.     MRI Brain/Cervical, my review of films, 7/24/24 as compared to 1/27/23 few scattered T2 lesions, PVWM and medulla without new/enlarging/enhancing lesions, no cord lesion     N/T on left arm and leg.  Sx are 5 out of 7 days.  Moderate intensity.       Walking is unsteady due to numbness.        Neuropathic pain       mg qhs.  Feet/hands worsening N/T     Fatigue      Provigil prn for fatigue helps with fatigue     Fatigue is moderate.  Heat intolerance is moderate.       MDD     Mood needs improvement  on Wellbutrin      Problem history:     Developed bilateral LE numbness.  Then progressed to left leg up to Face and arm.  Left sided increased sensitivity to touch.  Sx lasted for 3 months.  GBP used to improve pain.  No other relapses since first event.       Started on Aubagio in 10/2017.  Noted mild hair loss after starting.  Denies new or worsening sx.  Heat intolerance is moderate.  Fatigue is mild to moderate.  Balance is off and left leg is weaker.  ST memory decreasing.  Bladder frequency and urgency.       Feet have N/T at bedtime.   mg qhs wears off and does not last all night.         Reviewed previous medical records:     Dx with MS in 2005 by Dr Benitez Fraser.  Onset had numbness in both legs, then LE F/A/L N and allodynia.  Treated with interferon for 2 months, N/V.  Switched to Copaxone for 4 -5 years.  Then to Gilenya.  Tolerating Gilenya without noted dz activity.  Moved to [facility] due to low counts.  During switch to Aubagio noted new left medullary lesion with left leg weakness.  Stopped Aubagio due to  "insurance issues.       MRI Brain - 10/2/17 new area of enhancement in right medulla, moderate T2 lesions      Objective   Vital Signs:  /68   Pulse 61   Ht 162.6 cm (64.02\")   Wt 62.4 kg (137 lb 9.6 oz)   SpO2 97%   BMI 23.61 kg/m²   Estimated body mass index is 23.61 kg/m² as calculated from the following:    Height as of this encounter: 162.6 cm (64.02\").    Weight as of this encounter: 62.4 kg (137 lb 9.6 oz).    BMI is within normal parameters. No other follow-up for BMI required.    Neurological Exam  Mental Status  Awake, alert and oriented to person, place and time. Oriented to person, place and time. Speech is normal. Language is fluent with no aphasia. Attention and concentration are normal. Fund of knowledge is appropriate for level of education.    Cranial Nerves  CN III, IV, VI: Extraocular movements intact bilaterally. Pupils equal round and reactive to light bilaterally.  CN V: Facial sensation is normal.  CN VII: Full and symmetric facial movement.  CN IX, X: Palate elevates symmetrically  CN XI: Shoulder shrug strength is normal.  CN XII: Tongue midline without atrophy or fasciculations.    Motor   Strength is 5/5 throughout all four extremities.    Sensory  Sensation is intact to light touch, pinprick, vibration and proprioception in all four extremities.    Reflexes  Deep tendon reflexes are 2+ and symmetric in all four extremities.    Coordination    Finger-to-nose, rapid alternating movements and heel-to-shin normal bilaterally without dysmetria.    Gait  Normal casual, toe, heel and tandem gait.       Physical Exam  Eyes:      Extraocular Movements: Extraocular movements intact.      Pupils: Pupils are equal, round, and reactive to light.   Neurological:      Motor: Motor strength is normal.     Coordination: Coordination is intact.      Deep Tendon Reflexes: Reflexes are normal and symmetric.   Psychiatric:         Speech: Speech normal.        Result Review :  The following data " was reviewed by: Surinder Stover MD on 07/30/2025:  Common labs          6/13/2025    09:19   Common Labs   Glucose 100    BUN 22    Creatinine 0.91    Sodium 139    Potassium 5.0    Chloride 102    Calcium 9.4    Albumin 4.2    Total Bilirubin <0.2    Alkaline Phosphatase 108    AST (SGOT) 19    ALT (SGPT) 15    WBC 3.3    Hemoglobin 11.8    Hematocrit 37.6    Platelets 219                Assessment and Plan   Diagnoses and all orders for this visit:    1. Multiple sclerosis (Primary)  Assessment & Plan:  Continue Kesimpta 20 mg sq q 4 weeks.     MRI B/C stable     Orders:  -     modafinil (PROVIGIL) 200 MG tablet; Take 1 tablet by mouth Daily As Needed (fatigue).  Dispense: 90 tablet; Refill: 1    2. Restless legs syndrome (RLS)  Assessment & Plan:  Continue Mirapex 0.25 - 0. 5 mg qhs       3. Episode of recurrent major depressive disorder, unspecified depression episode severity  Assessment & Plan:  Continue Wellbutrin  mg qam       4. Multiple sclerosis  Comments:  Cont Gilenya.   Labs today  MRI next visit  Assessment & Plan:  Continue Kesimpta 20 mg sq q 4 weeks.     MRI B/C stable     Orders:  -     modafinil (PROVIGIL) 200 MG tablet; Take 1 tablet by mouth Daily As Needed (fatigue).  Dispense: 90 tablet; Refill: 1    5. Chronic fatigue  Comments:  Refill provigil  Assessment & Plan:  Continue provigil 200 mg qam    Orders:  -     modafinil (PROVIGIL) 200 MG tablet; Take 1 tablet by mouth Daily As Needed (fatigue).  Dispense: 90 tablet; Refill: 1    6. Neuropathic pain  Comments:  Lyrica refilled  Orders:  -     modafinil (PROVIGIL) 200 MG tablet; Take 1 tablet by mouth Daily As Needed (fatigue).  Dispense: 90 tablet; Refill: 1  -     gabapentin (NEURONTIN) 300 MG capsule; Take 1 capsule by mouth 3 (Three) Times a Day.  Dispense: 270 capsule; Refill: 1    7. Neuropathic pain  Comments:  Stop Lyrica, start GBP  Orders:  -     modafinil (PROVIGIL) 200 MG tablet; Take 1 tablet by mouth Daily As Needed  (fatigue).  Dispense: 90 tablet; Refill: 1  -     gabapentin (NEURONTIN) 300 MG capsule; Take 1 capsule by mouth 3 (Three) Times a Day.  Dispense: 270 capsule; Refill: 1    8. High risk medication use  -     gabapentin (NEURONTIN) 300 MG capsule; Take 1 capsule by mouth 3 (Three) Times a Day.  Dispense: 270 capsule; Refill: 1    9. Controlled substance agreement signed  -     gabapentin (NEURONTIN) 300 MG capsule; Take 1 capsule by mouth 3 (Three) Times a Day.  Dispense: 270 capsule; Refill: 1    Other orders  -     buPROPion XL (Wellbutrin XL) 300 MG 24 hr tablet; Take 1 tablet by mouth Every Morning.  Dispense: 90 tablet; Refill: 3  -     buPROPion XL (Wellbutrin XL) 150 MG 24 hr tablet; Take 1 tablet by mouth Every Morning.  Dispense: 90 tablet; Refill: 3             Follow Up   No follow-ups on file.  Patient was given instructions and counseling regarding her condition or for health maintenance advice. Please see specific information pulled into the AVS if appropriate.

## 2025-07-30 NOTE — TELEPHONE ENCOUNTER
Rx Refill Note  Requested Prescriptions     Pending Prescriptions Disp Refills    modafinil (PROVIGIL) 200 MG tablet [Pharmacy Med Name: MODAFINIL 200 MG  Tablet] 90 tablet 1     Sig: TAKE 1 TABLET BY MOUTH DAILY AS NEEDED (FATIGUE).      Last filled:01/30/25 w/1  Last office visit with prescribing clinician: 1/30/2025      Next office visit with prescribing clinician: 7/30/2025     Marcelle Tang MA  07/30/25, 10:42 EDT

## 2025-08-27 ENCOUNTER — SPECIALTY PHARMACY (OUTPATIENT)
Dept: ONCOLOGY | Facility: HOSPITAL | Age: 60
End: 2025-08-27
Payer: COMMERCIAL